# Patient Record
Sex: MALE | Race: BLACK OR AFRICAN AMERICAN | NOT HISPANIC OR LATINO | Employment: FULL TIME | URBAN - METROPOLITAN AREA
[De-identification: names, ages, dates, MRNs, and addresses within clinical notes are randomized per-mention and may not be internally consistent; named-entity substitution may affect disease eponyms.]

---

## 2017-03-22 ENCOUNTER — HOSPITAL ENCOUNTER (EMERGENCY)
Facility: OTHER | Age: 32
Discharge: HOME | End: 2017-03-22
Attending: INTERNAL MEDICINE

## 2017-03-22 VITALS
HEART RATE: 80 BPM | HEIGHT: 67 IN | OXYGEN SATURATION: 98 % | TEMPERATURE: 98 F | WEIGHT: 222 LBS | DIASTOLIC BLOOD PRESSURE: 56 MMHG | SYSTOLIC BLOOD PRESSURE: 112 MMHG | BODY MASS INDEX: 34.84 KG/M2 | RESPIRATION RATE: 20 BRPM

## 2017-03-22 DIAGNOSIS — R07.89 CHEST PAIN, NON-CARDIAC: Primary | ICD-10-CM

## 2017-03-22 LAB
ALBUMIN SERPL-MCNC: 3.6 G/DL (ref 3.3–5.5)
ALP SERPL-CCNC: 67 U/L (ref 42–141)
BILIRUB SERPL-MCNC: 0.8 MG/DL (ref 0.2–1.6)
BUN SERPL-MCNC: 11 MG/DL (ref 7–22)
CALCIUM SERPL-MCNC: 9.3 MG/DL (ref 8–10.3)
CHLORIDE SERPL-SCNC: 103 MMOL/L (ref 98–108)
CREAT SERPL-MCNC: 1 MG/DL (ref 0.6–1.2)
GLUCOSE SERPL-MCNC: 107 MG/DL (ref 73–118)
POC ALT (SGPT): 65 U/L (ref 10–47)
POC AST (SGOT): 64 U/L (ref 11–38)
POC CARDIAC TROPONIN I: 0 NG/ML
POC TCO2: 26 MMOL/L (ref 18–33)
POTASSIUM BLD-SCNC: 3.8 MMOL/L (ref 3.6–5.1)
PROTEIN, POC: 7.8 G/DL (ref 6.4–8.1)
SAMPLE: NORMAL
SODIUM BLD-SCNC: 144 MMOL/L (ref 128–145)

## 2017-03-22 PROCEDURE — 93005 ELECTROCARDIOGRAM TRACING: CPT

## 2017-03-22 PROCEDURE — 80053 COMPREHEN METABOLIC PANEL: CPT

## 2017-03-22 PROCEDURE — 85025 COMPLETE CBC W/AUTO DIFF WBC: CPT

## 2017-03-22 PROCEDURE — 93010 ELECTROCARDIOGRAM REPORT: CPT | Mod: ,,, | Performed by: INTERNAL MEDICINE

## 2017-03-22 PROCEDURE — 99284 EMERGENCY DEPT VISIT MOD MDM: CPT | Mod: 25

## 2017-03-22 PROCEDURE — 84484 ASSAY OF TROPONIN QUANT: CPT

## 2017-03-22 NOTE — ED AVS SNAPSHOT
"          [facility] EMERGENCY DEPARTMENT  [address] [place] [address]               Cristo Kennedy Penwright   3/22/2017  9:38 PM   ED    Description:  Male : 1985   Department:  [facility]           Your Care was Coordinated By:     Provider Role From To    Elvin Lux MD Attending Provider 17 2510 --      Reason for Visit     Chest Pain           Diagnoses this Visit        Comments    Chest pain, non-cardiac    -  Primary       ED Disposition     ED Disposition Condition Comment    Discharge             To Do List           Follow-up Information     Follow up with Gibran Wood NP In 1 day(s).    Specialty:  Internal Medicine    Contact information:    [address] [place] [address]  [phone]        [facility] On Call     [facility] On Call Nurse Care Line -  Assistance  Registered nurses in the [facility] On Call Center provide clinical advisement, health education, appointment booking, and other advisory services.  Call for this free service at [phone].             Medications           Message regarding Medications     Verify the changes and/or additions to your medication regime listed below are the same as discussed with your clinician today.  If any of these changes or additions are incorrect, please notify your healthcare provider.             Verify that the below list of medications is an accurate representation of the medications you are currently taking.  If none reported, the list may be blank. If incorrect, please contact your healthcare provider. Carry this list with you in case of emergency.           Current Medications     albuterol (ACCUNEB) 0.63 mg/3 mL Nebu Take 3 mLs (0.63 mg total) by nebulization every 6 (six) hours as needed (cough).           Clinical Reference Information           Your Vitals Were     BP Pulse Temp Resp Height Weight    132/73 87 98.4 °F (36.9 °C) (Temporal) 20 5' 7" (1.702 m) 100.7 kg (222 lb)    SpO2 " BMI             100% 34.77 kg/m2         Allergies as of 3/22/2017     No Known Allergies      Immunizations Administered on Date of Encounter - 3/22/2017     None      ED Micro, Lab, POCT     Start Ordered       Status Ordering Provider    03/22/17 2233 03/22/17 2233  Troponin ISTAT  Once      Final result     03/22/17 2229 03/22/17 2229  POCT CMP  Once      Final result     03/22/17 2214 03/22/17 2213  POCT CMP  Once      Completed     03/22/17 2214 03/22/17 2213  POCT CBC  Once      Acknowledged     03/22/17 2213 03/22/17 2213  POCT Troponin  Once      Completed       ED Imaging Orders     None        Discharge Instructions           Noncardiac Chest Pain    Based on your visit today, the health care provider doesnt know what is causing your chest pain. In most cases, people who come to the emergency department with chest pain dont have a problem with their heart. Instead, the pain is caused by other conditions. These may be problems with the lungs, muscles, bones, digestive tract, nerves, or mental health.  Lung problems  · Inflammation around the lungs (pleurisy)  · Collapsed lung (pneumothorax)  · Fluid around the lungs (pleural effusion)  · Lung cancer. This is a rare cause of chest pain.  Muscle or bone problems  · Inflamed cartilage between the ribs (pleurisy)  · Fibromyalgia  · Rheumatoid arthritis  Digestive system problems  · Reflux  · Stomach ulcer  · Spasms of the esophagus  · Gall stones  · Gallbladder inflammation  Mental health conditions  · Panic or anxiety attacks  · Emotional distress  Your condition doesnt seem serious and your pain doesnt appear to be coming from your heart. But sometimes the signs of a serious problem take more time to appear. Watch for the warning signs listed below.  Home care  Follow these guidelines when caring for yourself at home:  · Rest today and avoid strenuous activity.  · Take any prescribed medicine as directed.  Follow-up care  Follow up with your health care  provider, or as advised, if you dont start to feel better within 24 hours.  When to seek medical advice  Call your health care provider right away if any of these occur:  · A change in the type of pain. Call if it feels different, becomes more serious, lasts longer, or begins to spread into your shoulder, arm, neck, jaw, or back.  · Shortness of breath  · You feel more pain when you breathe  · Cough with dark-colored mucus or blood  · Weakness, dizziness, or fainting  · Fever of 100.4ºF (38ºC) or higher, or as directed by your health care provider  · Swelling, pain, or redness in one leg  Date Last Reviewed: 11/24/2014  © 9562-2804 [employer]. [address] [place] [address], [place] [address]. All rights reserved. This information is not intended as a substitute for professional medical care. Always follow your healthcare professional's instructions.          [facility] Sign-Up     Activating your [facility] account is as easy as 1-2-3!     1) Visit [email or web address], select Sign Up Now, enter this activation code and your date of birth, then select Next.  [email or web address]  Expires: 5/6/2017 11:32 PM      2) Create a username and password to use when you visit [facility] in the future and select a security question in case you lose your password and select Next.    3) Enter your e-mail address and click Sign Up!    Additional Information  If you have questions, please e-mail [email or web address] or call [phone] to talk to our [facility] staff. Remember, [facility] is NOT to be used for urgent needs. For medical emergencies, dial 911.         Smoking Cessation     If you would like to quit smoking:   You may be eligible for free services if you are a [place] resident and started smoking cigarettes before September 1, 1988.  Call the Smoking Cessation Trust (SCT) toll free at (475) 489-6201 or (997) 351-1972.   Call 4-873-QUIT-NOW if you do not meet the above criteria.             [facility]  Department complies with applicable Federal civil rights laws and does not discriminate on the basis of race, color, national origin, age, disability, or sex.        Language Assistance Services     ATTENTION: Language assistance services are available, free of charge. Please call [phone].      ATENCIÓN: Si habla denisañol, tiene a loyola disposición servicios gratuitos de asistencia lingüística. Llame al [phone].     CHÚ Ý: N?u b?n nói Ti?ng Vi?t, có các d?ch v? h? tr? ngôn ng? mi?n phí dành cho b?n. G?i s? [phone].

## 2017-03-23 NOTE — DISCHARGE INSTRUCTIONS
Noncardiac Chest Pain    Based on your visit today, the health care provider doesnt know what is causing your chest pain. In most cases, people who come to the emergency department with chest pain dont have a problem with their heart. Instead, the pain is caused by other conditions. These may be problems with the lungs, muscles, bones, digestive tract, nerves, or mental health.  Lung problems  · Inflammation around the lungs (pleurisy)  · Collapsed lung (pneumothorax)  · Fluid around the lungs (pleural effusion)  · Lung cancer. This is a rare cause of chest pain.  Muscle or bone problems  · Inflamed cartilage between the ribs (pleurisy)  · Fibromyalgia  · Rheumatoid arthritis  Digestive system problems  · Reflux  · Stomach ulcer  · Spasms of the esophagus  · Gall stones  · Gallbladder inflammation  Mental health conditions  · Panic or anxiety attacks  · Emotional distress  Your condition doesnt seem serious and your pain doesnt appear to be coming from your heart. But sometimes the signs of a serious problem take more time to appear. Watch for the warning signs listed below.  Home care  Follow these guidelines when caring for yourself at home:  · Rest today and avoid strenuous activity.  · Take any prescribed medicine as directed.  Follow-up care  Follow up with your health care provider, or as advised, if you dont start to feel better within 24 hours.  When to seek medical advice  Call your health care provider right away if any of these occur:  · A change in the type of pain. Call if it feels different, becomes more serious, lasts longer, or begins to spread into your shoulder, arm, neck, jaw, or back.  · Shortness of breath  · You feel more pain when you breathe  · Cough with dark-colored mucus or blood  · Weakness, dizziness, or fainting  · Fever of 100.4ºF (38ºC) or higher, or as directed by your health care provider  · Swelling, pain, or redness in one leg  Date Last Reviewed: 11/24/2014  © 7071-8236  [employer] [facility], [email or web address]. [address] [place] [address], [place] [address]. All rights reserved. This information is not intended as a substitute for professional medical care. Always follow your healthcare professional's instructions.

## 2017-03-23 NOTE — ED PROVIDER NOTES
Encounter Date: 3/22/2017       History     Chief Complaint   Patient presents with    Chest Pain     Review of patient's allergies indicates:  No Known Allergies  Patient is a 31 y.o. male presenting with the following complaint: chest pain. The history is provided by the patient. No  was used.   Chest Pain   Illness onset: at noon today. Duration of episode(s) is 3 hours. Chest pain occurs frequently. The chest pain is resolved. At its most intense, the chest pain is at 8/10. The chest pain is currently at 0/10. The quality of the pain is described as sharp. The pain does not radiate. Pertinent negatives for primary symptoms include no palpitations. He tried nothing for the symptoms. There are no known risk factors.     Past Medical History:   Diagnosis Date    Gastric ulcer due to Helicobacter pylori     Gastric ulcer, acute     GERD (gastroesophageal reflux disease)      Past Surgical History:   Procedure Laterality Date    HERNIA REPAIR       History reviewed. No pertinent family history.  Social History   Substance Use Topics    Smoking status: Current Some Day Smoker     Types: Cigarettes    Smokeless tobacco: Never Used    Alcohol use Yes     Review of Systems   Constitutional: Negative.    HENT: Negative.    Eyes: Negative.    Respiratory: Negative.    Cardiovascular: Positive for chest pain. Negative for palpitations and leg swelling.   Gastrointestinal: Negative for abdominal distention, blood in stool, constipation and diarrhea.   Endocrine: Negative.    Musculoskeletal: Negative.    Skin: Negative.    Allergic/Immunologic: Negative.    Neurological: Negative.    Hematological: Negative.        Physical Exam   Initial Vitals   BP Pulse Resp Temp SpO2   03/22/17 2128 03/22/17 2128 03/22/17 2128 03/22/17 2128 03/22/17 2128   131/78 85 20 98.4 °F (36.9 °C) 96 %     Physical Exam    Nursing note and vitals reviewed.  Constitutional: He appears well-developed and well-nourished.    HENT:   Head: Normocephalic.   Eyes: EOM are normal.   Neck: Normal range of motion. Neck supple.   Cardiovascular: Normal rate, regular rhythm, normal heart sounds and intact distal pulses. Exam reveals no gallop and no friction rub.    No murmur heard.  Pulmonary/Chest: Breath sounds normal.   Abdominal: Soft. Bowel sounds are normal.   Musculoskeletal: Normal range of motion.   Neurological: He is alert. He has normal strength.   Skin: Skin is warm and dry.         ED Course   Procedures  Labs Reviewed   TROPONIN ISTAT   POCT CBC   POCT TROPONIN   POCT CMP   POCT CMP        Labs Reviewed  Admission on 03/22/2017, Discharged on 03/22/2017   Component Date Value Ref Range Status    Albumin, POC 03/22/2017 3.6  3.3 - 5.5 g/dL Final    Alkaline Phosphatase, POC 03/22/2017 67  42 - 141 U/L Final    ALT (SGPT), POC 03/22/2017 65  10.0 - 47.0 U/L Final    AST (SGOT), POC 03/22/2017 64  11.0 - 38 U/L Final    POC BUN 03/22/2017 11  7.0 - 22.0 mg/dL Final    Calcium, POC 03/22/2017 9.3  8.0 - 10.3 mg/dL Final    POC Chloride 03/22/2017 103  98 - 108 mmol/L Final    POC Creatinine 03/22/2017 1.0  0.6 - 1.2 mg/dL Final    POC Glucose 03/22/2017 107  73 - 118 mg/dL Final    POC Potassium 03/22/2017 3.8  3.6 - 5.1 mmol/L Final    POC Sodium 03/22/2017 144  128 - 145 mmol/L Final    Bilirubin 03/22/2017 0.8  0.2 - 1.6 mg/dL Final    POC TCO2 03/22/2017 26  18 - 33 mmol/L Final    Protein 03/22/2017 7.8  6.4 - 8.1 g/dL Final    POC Cardiac Troponin I 03/22/2017 0.00  <0.09 ng/mL Final    Sample 03/22/2017 UNK   Final        Imaging Reviewed    Imaging Results     None          Medications given in ED    Medications - No data to display    Discharge Medications     Discharge Medication List as of 3/22/2017 11:33 PM      CONTINUE these medications which have NOT CHANGED    Details   albuterol (ACCUNEB) 0.63 mg/3 mL Nebu Take 3 mLs (0.63 mg total) by nebulization every 6 (six) hours as needed (cough)., Starting  11/20/2016, Until Mon 11/20/17, Print                   Patient discharged to home in stable condition with instructions to:   1. Please take all meds as prescribed.  2. Follow-up with your primary care doctor   3. Return precautions discussed and patient and/or family/caretaker understands to return to the emergency room for any concerns including worsening of your current symptoms, fever, chills, night sweats, worsening pain, chest pain, shortness of breath, nausea, vomiting, diarrhea, bleeding, headache, difficulty talking, visual disturbances, weakness, numbness or any other acute concerns                         ED Course     Clinical Impression:   Non-cardiac chest pain  Disposition:   Disposition: Discharged  Condition: Stable       Elvin Lux MD  03/22/17 7557

## 2017-03-23 NOTE — ED NOTES
Pt supine in bed alert and oriented, states he began having c/p this afternoon while at work, describes the pain as sharp and to the left of his chest, no radiation, no n/v, no previous episodes, denies swelling to hands or feet

## 2017-04-01 ENCOUNTER — HOSPITAL ENCOUNTER (EMERGENCY)
Facility: HOSPITAL | Age: 32
Discharge: HOME | End: 2017-04-01
Attending: EMERGENCY MEDICINE

## 2017-04-01 VITALS
TEMPERATURE: 101 F | OXYGEN SATURATION: 95 % | HEART RATE: 90 BPM | WEIGHT: 222 LBS | HEIGHT: 66 IN | SYSTOLIC BLOOD PRESSURE: 127 MMHG | BODY MASS INDEX: 35.68 KG/M2 | RESPIRATION RATE: 18 BRPM | DIASTOLIC BLOOD PRESSURE: 65 MMHG

## 2017-04-01 DIAGNOSIS — R55 VASOVAGAL EPISODE: Primary | ICD-10-CM

## 2017-04-01 DIAGNOSIS — K52.9 GASTROENTERITIS: ICD-10-CM

## 2017-04-01 DIAGNOSIS — E86.0 DEHYDRATION: ICD-10-CM

## 2017-04-01 LAB
FLUAV AG SPEC QL IA: NEGATIVE
FLUBV AG SPEC QL IA: NEGATIVE
POCT GLUCOSE: 104 MG/DL (ref 70–110)
SPECIMEN SOURCE: NORMAL

## 2017-04-01 PROCEDURE — 96374 THER/PROPH/DIAG INJ IV PUSH: CPT

## 2017-04-01 PROCEDURE — 96361 HYDRATE IV INFUSION ADD-ON: CPT

## 2017-04-01 PROCEDURE — 82962 GLUCOSE BLOOD TEST: CPT

## 2017-04-01 PROCEDURE — 87400 INFLUENZA A/B EACH AG IA: CPT | Mod: 59

## 2017-04-01 PROCEDURE — 63600175 PHARM REV CODE 636 W HCPCS: Performed by: NURSE PRACTITIONER

## 2017-04-01 PROCEDURE — 25000003 PHARM REV CODE 250: Performed by: NURSE PRACTITIONER

## 2017-04-01 PROCEDURE — 93005 ELECTROCARDIOGRAM TRACING: CPT

## 2017-04-01 PROCEDURE — 99284 EMERGENCY DEPT VISIT MOD MDM: CPT | Mod: 25

## 2017-04-01 RX ORDER — ONDANSETRON 4 MG/1
4 TABLET, FILM COATED ORAL EVERY 6 HOURS
Qty: 12 TABLET | Refills: 0 | Status: SHIPPED | OUTPATIENT
Start: 2017-04-01 | End: 2019-06-12

## 2017-04-01 RX ORDER — ONDANSETRON 2 MG/ML
4 INJECTION INTRAMUSCULAR; INTRAVENOUS
Status: COMPLETED | OUTPATIENT
Start: 2017-04-01 | End: 2017-04-01

## 2017-04-01 RX ORDER — ACETAMINOPHEN 500 MG
1000 TABLET ORAL
Status: COMPLETED | OUTPATIENT
Start: 2017-04-01 | End: 2017-04-01

## 2017-04-01 RX ADMIN — SODIUM CHLORIDE 1000 ML: 0.9 INJECTION, SOLUTION INTRAVENOUS at 02:04

## 2017-04-01 RX ADMIN — SODIUM CHLORIDE 1000 ML: 0.9 INJECTION, SOLUTION INTRAVENOUS at 01:04

## 2017-04-01 RX ADMIN — ONDANSETRON 4 MG: 2 INJECTION INTRAMUSCULAR; INTRAVENOUS at 01:04

## 2017-04-01 RX ADMIN — ACETAMINOPHEN 1000 MG: 500 TABLET ORAL at 03:04

## 2017-04-01 NOTE — ED PROVIDER NOTES
"Encounter Date: 4/1/2017    SCRIBE #1 NOTE: I, Ally Cruz, am scribing for, and in the presence of,  Jennifer Wellington NP. I have scribed the following portions of the note - Other sections scribed: HPI and ROS.       History     Chief Complaint   Patient presents with    Emesis     pt states vomiting and diarrhea since last night     Review of patient's allergies indicates:  No Known Allergies  HPI Comments: Chief Complaint: Emesis    HPI: This 31 y.o. Male with GERD and gastric ulcers presents to the ED c/o emesis. There's associated chills, nausea, more than 8 episodes of "watery" diarrhea and a generalized headache. Symptoms began suddenly at 9 pm last night. Symptoms are constant, severe and worsening. There's no attempted treatment. Patient denies blood in stool, abdominal pain or generalized body aches.     The history is provided by the patient. No  was used.     Past Medical History:   Diagnosis Date    Gastric ulcer due to Helicobacter pylori     Gastric ulcer, acute     GERD (gastroesophageal reflux disease)      Past Surgical History:   Procedure Laterality Date    HERNIA REPAIR       History reviewed. No pertinent family history.  Social History   Substance Use Topics    Smoking status: Current Some Day Smoker     Types: Cigarettes    Smokeless tobacco: Never Used    Alcohol use Yes     Review of Systems   Constitutional: Positive for chills. Negative for fever.   HENT: Negative for ear pain and sore throat.    Eyes: Negative for visual disturbance.   Respiratory: Negative for cough and shortness of breath.    Cardiovascular: Negative for chest pain.   Gastrointestinal: Positive for diarrhea, nausea and vomiting. Negative for abdominal pain and constipation.   Genitourinary: Negative for difficulty urinating and dysuria.   Musculoskeletal: Negative for back pain and myalgias.   Skin: Negative for rash.   Neurological: Positive for headaches. Negative for dizziness, " weakness and light-headedness.       Physical Exam   Initial Vitals   BP Pulse Resp Temp SpO2   04/01/17 1253 04/01/17 1253 04/01/17 1253 04/01/17 1253 04/01/17 1253   127/71 100 18 98.8 °F (37.1 °C) 98 %     Physical Exam    Nursing note and vitals reviewed.  Constitutional: He appears well-developed and well-nourished.   HENT:   Head: Normocephalic.   Dry m/m   Eyes: Conjunctivae are normal.   Neck: Normal range of motion. Neck supple.   Cardiovascular: Normal rate, regular rhythm and normal heart sounds.   Pulmonary/Chest: Breath sounds normal.   Abdominal: Soft. He exhibits no distension. There is no tenderness. There is no rebound and no guarding.   Hyperactive bowel sounds.   Musculoskeletal: Normal range of motion.   Neurological: He is alert and oriented to person, place, and time. He has normal strength.   Skin: Skin is warm and dry.   Psychiatric: He has a normal mood and affect.         ED Course   Procedures  Labs Reviewed   INFLUENZA A AND B ANTIGEN   POCT GLUCOSE   POCT GLUCOSE MONITORING CONTINUOUS             Medical Decision Making:   Initial Assessment:   31-year-old male presents with diarrhea since 9 PM last night he vomited ×1 this morning  Differential Diagnosis:   Enteritis  Infectious diarrhea  Appendicitis  ED Management:  Diagnosis management comments: This is an urgent evaluation of a 31-year-old male that presented to the ER with c/o diarrhea since 2100 last p.m. Pts exam was unremarkable except for dehydration.; pt does not appear ill or toxic.    I believe the headache is secondary to the dehydration.  I will rehydrate the patient I will give a little Zofran for the emesis that he had waited in the ER and we will reassess.    While the IV was being put in patient most likely had a vasovagal response to it upon my arrival in the room he was able to say his name he was diaphoretic he was a little pale his heart rate was in the 90s,  blood glucose was 107, his blood pressure was normal,  And his 12-lead EKG was negative for STEMI     Patient states he is feeling much better and the headache is gone.  Patient also reports any time he sees his own blood he passes out.     Based on exam today - I have low suspicion for medical, surgical or other life threatening illness and I believe pt is safe for discharge and outpatient f/u.    Plan discharge and was found the patient had a fever of 101.  I will send a rapid influenza and contact the patient if it is positive.    Pt verbalizes understanding of d/c instructions and will return for worsening condition.    Case was discussed with attending who agrees with A&P    Recklessly with negative left message on the patient's mobile phone            Scribe Attestation:   Scribe #1: I performed the above scribed service and the documentation accurately describes the services I performed. I attest to the accuracy of the note.    Attending Attestation:           Physician Attestation for Scribe:  Physician Attestation Statement for Scribe #1: I, Jennifer Wellington NP, reviewed documentation, as scribed by Ally Cruz in my presence, and it is both accurate and complete.                 ED Course     Clinical Impression:   The primary encounter diagnosis was Vasovagal episode. Diagnoses of Dehydration and Gastroenteritis were also pertinent to this visit.    Disposition:   Disposition: Discharged  Condition: Stable       Jennifer Wellington NP  04/01/17 6752

## 2017-04-01 NOTE — ED TRIAGE NOTES
Pt presents with nausea and vomiting since 2100 yesterday.  Pt states that having diarrhea along with emesis.   Fever and chills this morning.  Also states been having headaches.

## 2017-04-01 NOTE — DISCHARGE INSTRUCTIONS
Dehydration    The human body is comprised largely of water. If you lose more fluids than you take in, you can become dehydrated. This means there are not enough fluids in your body for it to function right. Mild dehydration can cause weakness, confusion, or muscle cramps. In extreme cases, it can lead to brain damage and even death. That's why prompt treatment is crucial.  Risk factors  Anyone can become dehydrated. But infants, children, and older adults are at greatest risk. You are most likely to lose fluids with severe vomiting, diarrhea, or a fever. Exercising or working hard--especially in hot weather--can also cause excess fluid loss.  What to do  Drinking liquids is the best way to prevent dehydration. Water is best, but juice or frozen pops can also help. Your doctor may suggest electrolyte solutions for sick infants and young children.  When to go to the emergency room (ER)  Go to an ER right away for these symptoms:  Adults  · Very dark urine and little urine output  · Dizziness, weakness, confusion, fainting  Children  · Sunken eyes  · Little or no urine output (for infants, no wet diaper in 8 hours)  · Very dark urine  · Skin that doesn't bounce back quickly when pinched  · Crying without tears  What to expect in the ER  Your blood pressure, temperature, and heart rate will be checked. You may have blood or urine tests. The main treatment for dehydration is fluids. You may be given these to drink. Or, you may receive them through a vein in your arm. You also may be treated for diarrhea, vomiting, or a high fever.   Date Last Reviewed: 7/18/2015  © 5815-3567 [employer]. [address] [place] [address], [place] [address]. All rights reserved. This information is not intended as a substitute for professional medical care. Always follow your healthcare professional's instructions.          Noninfectious Gastroenteritis (Ages 6 Years to Adult)    Gastroenteritis can cause nausea, vomiting, diarrhea,  and abdominal cramping. This may occur as a result of food sensitivity, inflammation of your gastrointestinal tract, medicines, stress, or other causes not related to infection. Your symptoms will usually last from 1 to 3 days, but can last longer. Antibiotics are not effective, but simple home treatment will be helpful.  Home care  Medicine  · You may use acetaminophen or NSAID medicines like ibuprofen or naproxen to control fever, unless another medicine is prescribed. (Note: If you have chronic liver or kidney disease, or ever had a stomach ulcer or gastrointestinalI bleeding, talk with your healthcare provider before using these medicines.) Aspirin should never be used in anyone under 18 years of age who is ill with a fever. It may cause severe liver damage. Don't increase your NSAID medicines if you are already taking these medicines for another condition (like arthritis). Don't use NSAIDS if you are on aspirin (such as for heart disease, or after a stroke).  · If medicines for diarrhea or vomiting are prescribed, take only as directed.  General care and preventing spread of the illness  · If symptoms are severe, rest at home for the next 24 hours or until you feel better.  · Hand washing with soap and water is the best way to prevent the spread of infection. Wash your hands after touching anyone who is sick.  · Wash your hands after using the toilet and before meals. Clean the toilet after each use.  · Caffeine, tobacco, and alcohol can make your diarrhea, cramping, and pain worse.  Diet  · Water and clear liquids are important so you do not get dehydrated. Drink a small amount at a time.  · Do not force yourself to eat, especially if you have cramps, vomiting, or diarrhea. When you finally decide to start eating, do not eat large amounts at a time, even if you are hungry.  · If you eat, avoid fatty, greasy, spicy, or fried foods.  · Do not eat dairy products if you have diarrhea; they can make the diarrhea  worse.  During the first 24 hours (the first full day), follow the diet below:  · Beverages: Water, clear liquids, soft drinks without caffeine, like ginger ale; mineral water (plain or flavored); decaffeinated tea and coffee.  · Soups: Clear broth, consommé, and bouillon Sports drinks aren't a good choice because they have too much sugar and not enough electrolytes. In this case, commercially available products called oral rehydration solutions are best.  · Desserts: Plain gelatin, popsicles, and fruit juice bars.  During the next 24 hours (the second day), you may add the following to the above if you have improved. If not, continue what you did the first day:  · Hot cereal, plain toast, bread, rolls, crackers  · Plain noodles, rice, mashed potatoes, chicken noodle or rice soup  · Unsweetened canned fruit (avoid pineapple), bananas  · Limit caffeine and chocolate. No spices or seasonings except salt.  During the next 24 hours  · Gradually resume a normal diet, as you feel better and your symptoms improve.  · If at any time your symptoms start getting worse, go back to clear liquids until you feel better.  Food preparation  · If you have diarrhea, you should not prepare food for others. When you  prepare food for yourself, wash your hands before and after.  · Wash your hands after using cutting boards, countertops, and knives that have been in contact with raw food.  · Keep uncooked meats away from cooked and ready-to-eat foods.  Follow-up care  Follow up with your healthcare provider if you are not improving over the next 2 to 3 days, or as advised. If a stool (diarrhea) sample was taken, call for the results as directed.  When to seek medical care  Call your healthcare provider right away if any of these occur:   · Increasing abdominal pain or constant lower right abdominal pain  · Continued vomiting (unable to keep liquids down)  · Frequent diarrhea (more than 5 times a day)  · Blood in vomit or stool (black or  red color)  · Inability to tolerate solid food after a few days.  · Dark urine, reduced urine output  · Weakness, dizziness  · Drowsiness  · Fever of 100.4ºF (38.0ºC) or higher, or as directed by your healthcare provider  · New rash  Call 911  Call 911 if any of these occur:  · Trouble breathing  · Chest pain  · Confusion  · Severe drowsiness or trouble awakening  · Seizure  · Stiff neck  Date Last Reviewed: 11/16/2015 © 2000-2016 [employer]. [address] [place] [address]. All rights reserved. This information is not intended as a substitute for professional medical care. Always follow your healthcare professional's instructions.        Fainting: Vagal Reaction  Fainting (syncope) is a temporary loss of consciousness. Its also called passing out. It occurs when blood flow to the brain is less than normal. Your health care provider believes that your fainting was because of a vagal reaction. This condition is not a sign of serious disease.  A vagal reaction is a response in your body that causes your pulse to slow down or the blood vessels to expand. This causes your blood pressure to fall. And this sends less blood to your brain if you are standing or sitting. That results in dizziness, near-fainting, or fainting. Lying down usually stops the reaction within 60 seconds.  This response can occur during sudden fear, severe pain, emotional stress, overexertion, overheating, hunger, nausea or vomiting, prolonged standing, or standing up after sitting or lying for a long time.  Home care  Follow these guidelines when caring for yourself at home:  · Rest today. Go back to your normal activities as soon as you are feeling back to normal.  · If you feel lightheaded or dizzy, lie down right away. Or sit with your head lowered between your knees.  Follow-up care  Follow up with your health care provider, or as advised.  When to seek medical advice  Call your health care provider right away if any  of these occur:  · Another fainting spell thats not explained by the common causes listed above  · Pain in your chest, arm, neck, jaw, back, or abdomen  · Shortness of breath  · Severe headache or seizure  · Blood in vomit or stools (black or red color)  · Unexpected vaginal bleeding  · Your heart beats very rapidly, very slowly, or irregularly (palpitations)  Also call your provider if you have signs of stroke:  · Weakness in an arm or leg or on one side of the face  · Difficulty speaking or seeing  · Extreme drowsiness, confusion, dizziness, or fainting   Date Last Reviewed: 11/25/2014  © 3975-1973 [employer]. [address] [place] [address], [place] [address]. All rights reserved. This information is not intended as a substitute for professional medical care. Always follow your healthcare professional's instructions.

## 2017-04-01 NOTE — ED AVS SNAPSHOT
[facility]  [place] [address]               Cristo Ruff   2017  1:14 PM   ED    Description:  Male : 1985   Department:  [facility]           Your Care was Coordinated By:     Provider Role From To    Concha Johnson MD Attending Provider 17 [phone] --    Jennifer Wellington NP Nurse Practitioner 17 [phone] --      Reason for Visit     Emesis           Diagnoses this Visit        Comments    Vasovagal episode    -  Primary     Dehydration         Gastroenteritis           ED Disposition     ED Disposition Condition Comment    Discharge             To Do List           Follow-up Information     Schedule an appointment as soon as possible for a visit with Gibran Wood NP.    Specialty:  Internal Medicine    Contact information:    [address] [place] [address] [phone]          Follow up with [facility].    Specialty:  Emergency Medicine    Why:  If symptoms worsen or any other concerns    Contact information:    [place] [address] [phone]       These Medications        Disp Refills Start End    ondansetron (ZOFRAN) 4 MG tablet 12 tablet 0 2017     Take 1 tablet (4 mg total) by mouth every 6 (six) hours. - Oral    Pharmacy: [facility] [address] [place] [address] AT [facility] #: [phone]         [facility]     [facility] Care [facility] - 24/ Assistance  Unless otherwise directed by your provider, please contact [facility], our nurse care line that is available for 24/7 assistance.     Registered nurses in the [facility] provide: appointment scheduling, clinical advisement, health education, and other advisory services.  Call: [phone] (toll free)               Medications           Message regarding Medications     Verify the changes and/or additions to your  "medication regime listed below are the same as discussed with your clinician today.  If any of these changes or additions are incorrect, please notify your healthcare provider.        START taking these NEW medications        Refills    ondansetron (ZOFRAN) 4 MG tablet 0    Sig: Take 1 tablet (4 mg total) by mouth every 6 (six) hours.    Class: Print    Route: Oral      These medications were administered today        Dose Freq    sodium chloride 0.9% bolus 1,000 mL 1,000 mL ED 1 Time    Sig: Inject 1,000 mLs into the vein ED 1 Time.    Class: Normal    Route: Intravenous    ondansetron injection 4 mg 4 mg ED 1 Time    Sig: Inject 4 mg into the vein ED 1 Time.    Class: Normal    Route: Intravenous    sodium chloride 0.9% bolus 1,000 mL 1,000 mL ED 1 Time    Sig: Inject 1,000 mLs into the vein ED 1 Time.    Class: Normal    Route: Intravenous           Verify that the below list of medications is an accurate representation of the medications you are currently taking.  If none reported, the list may be blank. If incorrect, please contact your healthcare provider. Carry this list with you in case of emergency.           Current Medications     albuterol (ACCUNEB) 0.63 mg/3 mL Nebu Take 3 mLs (0.63 mg total) by nebulization every 6 (six) hours as needed (cough).    ondansetron (ZOFRAN) 4 MG tablet Take 1 tablet (4 mg total) by mouth every 6 (six) hours.           Clinical Reference Information           Your Vitals Were     BP Pulse Temp Resp Height Weight    104/64 (BP Location: Left arm, Patient Position: Sitting, BP Method: Automatic) 100 98.8 °F (37.1 °C) (Oral) 18 5' 6" (1.676 m) 100.7 kg (222 lb)    SpO2 BMI             98% 35.83 kg/m2         Allergies as of 4/1/2017     No Known Allergies      Immunizations Administered on Date of Encounter - 4/1/2017     None      ED Micro, Lab, POCT     Start Ordered       Status Ordering Provider    04/01/17 1346 04/01/17 1346  POCT glucose  Once      Final result     " 04/01/17 1346 04/01/17 1345  POCT glucose  Once      Acknowledged       ED Imaging Orders     None        Discharge Instructions         Dehydration    The human body is comprised largely of water. If you lose more fluids than you take in, you can become dehydrated. This means there are not enough fluids in your body for it to function right. Mild dehydration can cause weakness, confusion, or muscle cramps. In extreme cases, it can lead to brain damage and even death. That's why prompt treatment is crucial.  Risk factors  Anyone can become dehydrated. But infants, children, and older adults are at greatest risk. You are most likely to lose fluids with severe vomiting, diarrhea, or a fever. Exercising or working hard--especially in hot weather--can also cause excess fluid loss.  What to do  Drinking liquids is the best way to prevent dehydration. Water is best, but juice or frozen pops can also help. Your doctor may suggest electrolyte solutions for sick infants and young children.  When to go to the emergency room (ER)  Go to an ER right away for these symptoms:  Adults  · Very dark urine and little urine output  · Dizziness, weakness, confusion, fainting  Children  · Sunken eyes  · Little or no urine output (for infants, no wet diaper in 8 hours)  · Very dark urine  · Skin that doesn't bounce back quickly when pinched  · Crying without tears  What to expect in the ER  Your blood pressure, temperature, and heart rate will be checked. You may have blood or urine tests. The main treatment for dehydration is fluids. You may be given these to drink. Or, you may receive them through a vein in your arm. You also may be treated for diarrhea, vomiting, or a high fever.   Date Last Reviewed: 7/18/2015 © 2000-2016 [employer]. [address] [place] [address], [place] [address]. All rights reserved. This information is not intended as a substitute for professional medical care. Always follow your healthcare  professional's instructions.          Noninfectious Gastroenteritis (Ages 6 Years to Adult)    Gastroenteritis can cause nausea, vomiting, diarrhea, and abdominal cramping. This may occur as a result of food sensitivity, inflammation of your gastrointestinal tract, medicines, stress, or other causes not related to infection. Your symptoms will usually last from 1 to 3 days, but can last longer. Antibiotics are not effective, but simple home treatment will be helpful.  Home care  Medicine  · You may use acetaminophen or NSAID medicines like ibuprofen or naproxen to control fever, unless another medicine is prescribed. (Note: If you have chronic liver or kidney disease, or ever had a stomach ulcer or gastrointestinalI bleeding, talk with your healthcare provider before using these medicines.) Aspirin should never be used in anyone under 18 years of age who is ill with a fever. It may cause severe liver damage. Don't increase your NSAID medicines if you are already taking these medicines for another condition (like arthritis). Don't use NSAIDS if you are on aspirin (such as for heart disease, or after a stroke).  · If medicines for diarrhea or vomiting are prescribed, take only as directed.  General care and preventing spread of the illness  · If symptoms are severe, rest at home for the next 24 hours or until you feel better.  · Hand washing with soap and water is the best way to prevent the spread of infection. Wash your hands after touching anyone who is sick.  · Wash your hands after using the toilet and before meals. Clean the toilet after each use.  · Caffeine, tobacco, and alcohol can make your diarrhea, cramping, and pain worse.  Diet  · Water and clear liquids are important so you do not get dehydrated. Drink a small amount at a time.  · Do not force yourself to eat, especially if you have cramps, vomiting, or diarrhea. When you finally decide to start eating, do not eat large amounts at a time, even if you  are hungry.  · If you eat, avoid fatty, greasy, spicy, or fried foods.  · Do not eat dairy products if you have diarrhea; they can make the diarrhea worse.  During the first 24 hours (the first full day), follow the diet below:  · Beverages: Water, clear liquids, soft drinks without caffeine, like ginger ale; mineral water (plain or flavored); decaffeinated tea and coffee.  · Soups: Clear broth, consommé, and bouillon Sports drinks aren't a good choice because they have too much sugar and not enough electrolytes. In this case, commercially available products called oral rehydration solutions are best.  · Desserts: Plain gelatin, popsicles, and fruit juice bars.  During the next 24 hours (the second day), you may add the following to the above if you have improved. If not, continue what you did the first day:  · Hot cereal, plain toast, bread, rolls, crackers  · Plain noodles, rice, mashed potatoes, chicken noodle or rice soup  · Unsweetened canned fruit (avoid pineapple), bananas  · Limit caffeine and chocolate. No spices or seasonings except salt.  During the next 24 hours  · Gradually resume a normal diet, as you feel better and your symptoms improve.  · If at any time your symptoms start getting worse, go back to clear liquids until you feel better.  Food preparation  · If you have diarrhea, you should not prepare food for others. When you  prepare food for yourself, wash your hands before and after.  · Wash your hands after using cutting boards, countertops, and knives that have been in contact with raw food.  · Keep uncooked meats away from cooked and ready-to-eat foods.  Follow-up care  Follow up with your healthcare provider if you are not improving over the next 2 to 3 days, or as advised. If a stool (diarrhea) sample was taken, call for the results as directed.  When to seek medical care  Call your healthcare provider right away if any of these occur:   · Increasing abdominal pain or constant lower right  abdominal pain  · Continued vomiting (unable to keep liquids down)  · Frequent diarrhea (more than 5 times a day)  · Blood in vomit or stool (black or red color)  · Inability to tolerate solid food after a few days.  · Dark urine, reduced urine output  · Weakness, dizziness  · Drowsiness  · Fever of 100.4ºF (38.0ºC) or higher, or as directed by your healthcare provider  · New rash  Call 911  Call 911 if any of these occur:  · Trouble breathing  · Chest pain  · Confusion  · Severe drowsiness or trouble awakening  · Seizure  · Stiff neck  Date Last Reviewed: 11/16/2015 © 2000-2016 [employer]. [address] [place] [address], [place] [address]. All rights reserved. This information is not intended as a substitute for professional medical care. Always follow your healthcare professional's instructions.        Fainting: Vagal Reaction  Fainting (syncope) is a temporary loss of consciousness. Its also called passing out. It occurs when blood flow to the brain is less than normal. Your health care provider believes that your fainting was because of a vagal reaction. This condition is not a sign of serious disease.  A vagal reaction is a response in your body that causes your pulse to slow down or the blood vessels to expand. This causes your blood pressure to fall. And this sends less blood to your brain if you are standing or sitting. That results in dizziness, near-fainting, or fainting. Lying down usually stops the reaction within 60 seconds.  This response can occur during sudden fear, severe pain, emotional stress, overexertion, overheating, hunger, nausea or vomiting, prolonged standing, or standing up after sitting or lying for a long time.  Home care  Follow these guidelines when caring for yourself at home:  · Rest today. Go back to your normal activities as soon as you are feeling back to normal.  · If you feel lightheaded or dizzy, lie down right away. Or sit with your head lowered between your  knees.  Follow-up care  Follow up with your health care provider, or as advised.  When to seek medical advice  Call your health care provider right away if any of these occur:  · Another fainting spell thats not explained by the common causes listed above  · Pain in your chest, arm, neck, jaw, back, or abdomen  · Shortness of breath  · Severe headache or seizure  · Blood in vomit or stools (black or red color)  · Unexpected vaginal bleeding  · Your heart beats very rapidly, very slowly, or irregularly (palpitations)  Also call your provider if you have signs of stroke:  · Weakness in an arm or leg or on one side of the face  · Difficulty speaking or seeing  · Extreme drowsiness, confusion, dizziness, or fainting   Date Last Reviewed: 11/25/2014  © 9911-5802 [employer]. [address] [place] [address]. All rights reserved. This information is not intended as a substitute for professional medical care. Always follow your healthcare professional's instructions.          [facility] Sign-Up     Activating your [facility] account is as easy as 1-2-3!     1) Visit [email or web address], select Sign Up Now, enter this activation code and your date of birth, then select Next.  [email or web address]  Expires: 5/6/2017 11:32 PM      2) Create a username and password to use when you visit [facility] in the future and select a security question in case you lose your password and select Next.    3) Enter your e-mail address and click Sign Up!    Additional Information  If you have questions, please e-mail [email or web address] or call [phone] to talk to our [facility] staff. Remember, [facility] is NOT to be used for urgent needs. For medical emergencies, dial 911.         Smoking Cessation     If you would like to quit smoking:   You may be eligible for free services if you are a [place] resident and started smoking cigarettes before September 1, 1988.  Call the Smoking Cessation Trust (SCT) toll free at (064)  904-1368 or (999) 329-9856.   Call 1-800-QUIT-NOW if you do not meet the above criteria.   Contact us via email: [email or web address]   View our website for more information: [email or web address]         [facility] complies with applicable Federal civil rights laws and does not discriminate on the basis of race, color, national origin, age, disability, or sex.        Language Assistance Services     ATTENTION: Language assistance services are available, free of charge. Please call [phone].      ATENCIÓN: Si habla español, tiene a loyola disposición servicios gratuitos de asistencia lingüística. Llame al [phone].     CHÚ Ý: N?u b?n nói Ti?ng Vi?t, có các d?ch v? h? tr? ngôn ng? mi?n phí dành cho b?n. G?i s? [phone].

## 2017-10-08 ENCOUNTER — HOSPITAL ENCOUNTER (EMERGENCY)
Facility: OTHER | Age: 32
Discharge: HOME | End: 2017-10-08
Attending: EMERGENCY MEDICINE

## 2017-10-08 VITALS
SYSTOLIC BLOOD PRESSURE: 142 MMHG | WEIGHT: 202 LBS | HEART RATE: 61 BPM | RESPIRATION RATE: 16 BRPM | TEMPERATURE: 99 F | BODY MASS INDEX: 31.71 KG/M2 | OXYGEN SATURATION: 98 % | DIASTOLIC BLOOD PRESSURE: 82 MMHG | HEIGHT: 67 IN

## 2017-10-08 DIAGNOSIS — S60.221A CONTUSION OF RIGHT HAND, INITIAL ENCOUNTER: ICD-10-CM

## 2017-10-08 DIAGNOSIS — M79.644 THUMB PAIN, RIGHT: Primary | ICD-10-CM

## 2017-10-08 PROCEDURE — 29125 APPL SHORT ARM SPLINT STATIC: CPT | Mod: F5

## 2017-10-08 PROCEDURE — 99284 EMERGENCY DEPT VISIT MOD MDM: CPT | Mod: 25

## 2017-10-08 RX ORDER — HYDROCODONE BITARTRATE AND ACETAMINOPHEN 5; 325 MG/1; MG/1
1 TABLET ORAL EVERY 4 HOURS PRN
Qty: 6 TABLET | Refills: 0 | Status: SHIPPED | OUTPATIENT
Start: 2017-10-08 | End: 2019-06-12

## 2017-10-08 RX ORDER — NAPROXEN 500 MG/1
500 TABLET ORAL 2 TIMES DAILY WITH MEALS
Qty: 20 TABLET | Refills: 0 | Status: SHIPPED | OUTPATIENT
Start: 2017-10-08 | End: 2019-06-12

## 2017-10-08 RX ORDER — DICLOFENAC SODIUM 10 MG/G
2 GEL TOPICAL 4 TIMES DAILY
Qty: 1 TUBE | Refills: 0 | Status: SHIPPED | OUTPATIENT
Start: 2017-10-08 | End: 2019-06-12

## 2017-10-08 NOTE — ED NOTES
PT VERIFIES THAT NAME AND  ARE CORRECT.     LOC: The patient is awake, alert and aware of environment with an appropriate affect, the patient is oriented x 3 and answers all questions appropriately.    APPEARANCE: Patient resting comfortably and in no acute distress.    SKIN: The skin is warm and dry, color consistent with ethnicity, patient has normal skin turgor and moist mucus membranes, skin intact, no breakdown, brusing or alterations in skin integrity noted.    MUSCULOSKELETAL: Patient moving all extremities well, CMS intact, slight swelling noted to base of right thumb    RESPIRATORY: Airway is open and patent, trachea is midline, respirations are spontaneous, even and non-labored, no use of accessory muscles noted.    NEUROLOGIC: eyes open spontaneously, behavior appropriate to situation, follows all commands    Pt reports he was helping is father move a generator when his dad dropped his end and his thumb got caught in the handle and bent downward

## 2017-10-09 NOTE — ED PROVIDER NOTES
"Encounter Date: 10/8/2017       History     Chief Complaint   Patient presents with    Hand Pain     Pt states," I got my hand cought in a handle and hurt my right thumb."     CC injured ritht thumb picking up a generator, JPTA.  Patient is right handed.       The history is provided by the patient.   Hand Injury    The incident occurred just prior to arrival. The incident occurred at home. The injury mechanism was a direct blow. The pain is present in the right fingers. The quality of the pain is described as throbbing. The pain is at a severity of 8/10. The pain has been constant since the incident. Pertinent negatives include no fever and no malaise/fatigue. He reports no foreign bodies present. The symptoms are aggravated by movement, use and palpation. He has tried nothing for the symptoms. The treatment provided no relief.     Review of patient's allergies indicates:  No Known Allergies  Past Medical History:   Diagnosis Date    Gastric ulcer due to Helicobacter pylori     Gastric ulcer, acute     GERD (gastroesophageal reflux disease)      Past Surgical History:   Procedure Laterality Date    HERNIA REPAIR       No family history on file.  Social History   Substance Use Topics    Smoking status: Current Some Day Smoker     Types: Cigarettes    Smokeless tobacco: Never Used    Alcohol use Yes     Review of Systems   Constitutional: Negative for fever and malaise/fatigue.   HENT: Negative for sore throat.    Respiratory: Negative for shortness of breath.    Cardiovascular: Negative for chest pain.   Gastrointestinal: Negative for nausea.   Genitourinary: Negative for dysuria.   Musculoskeletal: Positive for arthralgias and joint swelling. Negative for back pain.   Skin: Negative for rash.   Neurological: Negative for weakness.   Hematological: Does not bruise/bleed easily.   All other systems reviewed and are negative.      Physical Exam     Initial Vitals [10/08/17 1736]   BP Pulse Resp Temp SpO2   (!) " 142/82 61 16 98.9 °F (37.2 °C) 98 %      MAP       102         Physical Exam    Nursing note and vitals reviewed.  Constitutional: He appears well-developed and well-nourished.   HENT:   Head: Normocephalic and atraumatic.   Right Ear: External ear normal.   Left Ear: External ear normal.   Nose: Nose normal.   Eyes: EOM are normal. Pupils are equal, round, and reactive to light.   Neck: Normal range of motion. Neck supple.   Cardiovascular: Normal rate, regular rhythm and intact distal pulses.   Pulmonary/Chest: Breath sounds normal.   Musculoskeletal: Normal range of motion. He exhibits edema and tenderness.        Hands:  Neurological: He is alert and oriented to person, place, and time. He has normal strength. No cranial nerve deficit or sensory deficit.   Skin: Skin is warm and dry. Capillary refill takes less than 2 seconds.   Psychiatric: He has a normal mood and affect.         ED Course   Splint Application  Date/Time: 10/9/2017 12:36 PM  Performed by: CINDY WILCOX  Authorized by: CINDY WILCOX   Location details: right hand  Splint type: thumb spica  Supplies used: pre-fabricated splint with imoblization of thumb.  Post-procedure: The splinted body part was neurovascularly unchanged following the procedure.  Patient tolerance: Patient tolerated the procedure well with no immediate complications        Labs Reviewed - No data to display     Imaging Results          X-Ray Hand 3 view Right (Final result)  Result time 10/08/17 18:02:30    Final result by Cachorro Asif MD (10/08/17 18:02:30)                 Impression:       1.  No evidence of fracture in the region of the right thumb.    2.  Lucency involving the head of the fourth proximal phalanx.  The findings may represent a nondisplaced fracture.  Suggest correlation with clinical examination findings and point tenderness.              Electronically signed by: CACHORRO ASIF MD  Date:     10/08/17  Time:    18:02              Narrative:    Exam: [id number]   10/08/17  17:46:24 [id number] ([facility]) : XR HAND COMPLETE 3 VIEW RIGHT    Technique:    3 x-ray views of the right hand.    Comparison:     None     Findings:      There is a lucency involving the head of the fourth proximal phalanx with extension into the intra-articular surface.  The remaining bone mineralization is within normal limits.  The joint spaces are maintained.  The soft tissues are unremarkable.                                                   ED Course        Medical decision making   Chief complaint:right thumb pain and injury LPTA  Differential diagnosis:stain, sprain, and fracture  Treatment in the ED Physical Exam,   Patient reports feeling better after splint placed.    Discussed imaging results.    Fill and take prescriptions as directed.  Return to the ED if symptoms worsen or do not resolve.   Answered questions and discussed discharge plan.    Patient feels much better and is ready for discharge.  Follow up with PCP in 1 days.    Clinical Impression:   The primary encounter diagnosis was Thumb pain, right. A diagnosis of Contusion of right hand, initial encounter was also pertinent to this visit.                           Jacki Poon DO  10/09/17 [id number]

## 2018-06-22 ENCOUNTER — HOSPITAL ENCOUNTER (EMERGENCY)
Facility: HOSPITAL | Age: 33
Discharge: HOME | End: 2018-06-22
Attending: INTERNAL MEDICINE

## 2018-06-22 VITALS
SYSTOLIC BLOOD PRESSURE: 120 MMHG | TEMPERATURE: 98 F | HEIGHT: 63 IN | BODY MASS INDEX: 36.32 KG/M2 | WEIGHT: 205 LBS | HEART RATE: 60 BPM | DIASTOLIC BLOOD PRESSURE: 78 MMHG | OXYGEN SATURATION: 98 % | RESPIRATION RATE: 16 BRPM

## 2018-06-22 DIAGNOSIS — H10.32 ACUTE CONJUNCTIVITIS OF LEFT EYE, UNSPECIFIED ACUTE CONJUNCTIVITIS TYPE: Primary | ICD-10-CM

## 2018-06-22 PROCEDURE — 99284 EMERGENCY DEPT VISIT MOD MDM: CPT

## 2018-06-22 PROCEDURE — 25000003 PHARM REV CODE 250: Performed by: INTERNAL MEDICINE

## 2018-06-22 RX ORDER — TOBRAMYCIN AND DEXAMETHASONE 3; 1 MG/ML; MG/ML
2 SUSPENSION/ DROPS OPHTHALMIC
Qty: 5 ML | Refills: 0 | Status: SHIPPED | OUTPATIENT
Start: 2018-06-22 | End: 2018-06-27

## 2018-06-22 RX ORDER — TOBRAMYCIN AND DEXAMETHASONE 3; 1 MG/ML; MG/ML
2 SUSPENSION/ DROPS OPHTHALMIC
Status: COMPLETED | OUTPATIENT
Start: 2018-06-22 | End: 2018-06-22

## 2018-06-22 RX ADMIN — TOBRAMYCIN AND DEXAMETHASONE 2 DROP: 3; 1 SUSPENSION/ DROPS OPHTHALMIC at 01:06

## 2018-07-10 NOTE — ED PROVIDER NOTES
Encounter Date: 6/22/2018       History     Chief Complaint   Patient presents with    Eye Pain     pt states that his left eye started burning and draining. Unknown injury     The history is provided by the patient. No  was used.   Eye Pain    This is a new problem. The current episode started yesterday. The problem occurs intermittently. The problem has been unchanged. The left eye is affected. There was no injury mechanism. The pain is at a severity of 3/10. There is no history of trauma to the eye. Associated symptoms include discharge. He has tried nothing for the symptoms.     Review of patient's allergies indicates:  No Known Allergies  Past Medical History:   Diagnosis Date    Gastric ulcer due to Helicobacter pylori     Gastric ulcer, acute     GERD (gastroesophageal reflux disease)      Past Surgical History:   Procedure Laterality Date    HERNIA REPAIR       History reviewed. No pertinent family history.  Social History   Substance Use Topics    Smoking status: Current Some Day Smoker     Types: Vaping with nicotine    Smokeless tobacco: Never Used    Alcohol use Yes     Review of Systems   Eyes: Positive for pain and discharge.   All other systems reviewed and are negative.      Physical Exam     Initial Vitals [06/22/18 0054]   BP Pulse Resp Temp SpO2   120/78 60 16 98.1 °F (36.7 °C) 98 %      MAP       --         Physical Exam    Nursing note and vitals reviewed.  Constitutional: He appears well-developed and well-nourished.   HENT:   Head: Normocephalic and atraumatic.   Right Ear: External ear normal.   Left Ear: External ear normal.   Eyes: Conjunctivae and EOM are normal. Right eye exhibits no discharge (clear). Left eye exhibits discharge (clear).   Left conjunctival injection   Neck: Normal range of motion.   Cardiovascular: Normal rate, regular rhythm and normal heart sounds.   Pulmonary/Chest: Breath sounds normal. No respiratory distress. He has no wheezes.    Abdominal: Soft. Bowel sounds are normal. He exhibits no distension.   Musculoskeletal: Normal range of motion.   Neurological: He is alert.   Skin: Skin is warm and dry.   Psychiatric: He has a normal mood and affect. Thought content normal.         ED Course   Procedures  Labs Reviewed - No data to display       Imaging Results    None                               Clinical Impression:   The encounter diagnosis was Acute conjunctivitis of left eye, unspecified acute conjunctivitis type.      Disposition:   Disposition: Discharged  Condition: Stable                        Elvin Lux MD  07/10/18 0132

## 2019-02-01 ENCOUNTER — OFFICE VISIT (OUTPATIENT)
Dept: INTERNAL MEDICINE | Facility: CLINIC | Age: 34
End: 2019-02-01
Payer: COMMERCIAL

## 2019-02-01 VITALS
HEART RATE: 76 BPM | TEMPERATURE: 99 F | OXYGEN SATURATION: 97 % | BODY MASS INDEX: 34.73 KG/M2 | HEIGHT: 63 IN | WEIGHT: 196 LBS | DIASTOLIC BLOOD PRESSURE: 70 MMHG | SYSTOLIC BLOOD PRESSURE: 128 MMHG

## 2019-02-01 DIAGNOSIS — H53.9 CHANGES IN VISION: ICD-10-CM

## 2019-02-01 DIAGNOSIS — L21.9 SEBORRHEIC DERMATITIS: ICD-10-CM

## 2019-02-01 DIAGNOSIS — L98.9 SKIN LESION: ICD-10-CM

## 2019-02-01 DIAGNOSIS — E66.09 CLASS 1 OBESITY DUE TO EXCESS CALORIES WITHOUT SERIOUS COMORBIDITY WITH BODY MASS INDEX (BMI) OF 34.0 TO 34.9 IN ADULT: ICD-10-CM

## 2019-02-01 DIAGNOSIS — Z00.00 ANNUAL PHYSICAL EXAM: Primary | ICD-10-CM

## 2019-02-01 PROBLEM — E66.811 CLASS 1 OBESITY DUE TO EXCESS CALORIES WITHOUT SERIOUS COMORBIDITY WITH BODY MASS INDEX (BMI) OF 34.0 TO 34.9 IN ADULT: Status: ACTIVE | Noted: 2019-02-01

## 2019-02-01 PROCEDURE — 99385 PR PREVENTIVE VISIT,NEW,18-39: ICD-10-PCS | Mod: S$GLB,,, | Performed by: FAMILY MEDICINE

## 2019-02-01 PROCEDURE — 99999 PR PBB SHADOW E&M-EST. PATIENT-LVL IV: ICD-10-PCS | Mod: PBBFAC,,, | Performed by: FAMILY MEDICINE

## 2019-02-01 PROCEDURE — 99999 PR PBB SHADOW E&M-EST. PATIENT-LVL IV: CPT | Mod: PBBFAC,,, | Performed by: FAMILY MEDICINE

## 2019-02-01 PROCEDURE — 99385 PREV VISIT NEW AGE 18-39: CPT | Mod: S$GLB,,, | Performed by: FAMILY MEDICINE

## 2019-02-01 RX ORDER — KETOCONAZOLE 20 MG/G
CREAM TOPICAL 2 TIMES DAILY
Qty: 30 G | Refills: 2 | Status: SHIPPED | OUTPATIENT
Start: 2019-02-01 | End: 2019-06-12

## 2019-02-01 NOTE — PROGRESS NOTES
Subjective:      Patient ID: Cristo Ruff is a 33 y.o. male.    Chief Complaint: Establish Care      HPI:  Cristo Ruff is a 33 year old male with history of gastric ulcer 2/2 H. Pylori, GERD, obesity, and tobacco use who presents to clinic today to establish care.    Stressed lately, improving.  Went through a break up recently.  Did have decreased appetite with associated weight loss--improving.  Starting to talk to his ex again.  Has supportive family and friends.    Complains of skin lesion to low back.  Hyperpigmented.  Present for years.  Denies any recent changes.  Concerned about possible skin cancer.    GERD:  Not currently taking anything for this.  Stable lately.    Obesity:  BMI 34.72.  Previously 215 lbs, down to 183, then back to 196.  Not exercising outside of work.    Tobacco use:  Stopped vaping 2 months ago.  Quit smoking cigarettes 2 years ago; smoked 1-2 cigarettes per day since age 18 off and on.    Health Care Maintenance:  Influenza vaccination:  October 2018; done at [facility]  Last tetanus booster:  Will get today      Past Medical History:   Diagnosis Date    Gastric ulcer due to Helicobacter pylori     Gastric ulcer, acute     GERD (gastroesophageal reflux disease)        Past Surgical History:   Procedure Laterality Date    HERNIA REPAIR         Family History   Problem Relation Age of Onset    Hypertension Mother     Diabetes Mellitus Mother     Kidney failure Mother     Hypertension Father     Diabetes Mellitus Father     Hypertension Sister        Social History     Socioeconomic History    Marital status: Single     Spouse name: None    Number of children: None    Years of education: None    Highest education level: None   Social Needs    Financial resource strain: None    Food insecurity - worry: None    Food insecurity - inability: None    Transportation needs - medical: None    Transportation needs - non-medical: None   Occupational History     "Occupation:     Occupation: Automotive detailing   Tobacco Use    Smoking status: Former Smoker    Smokeless tobacco: Never Used   Substance and Sexual Activity    Alcohol use: Yes     Frequency: 2-4 times a month     Drinks per session: 1 or 2    Drug use: No    Sexual activity: Yes     Partners: Female     Birth control/protection: None   Other Topics Concern    None   Social History Narrative    None       Review of Systems   Constitutional: Negative for chills, fatigue and fever.   HENT: Negative for congestion, hearing loss, nosebleeds, rhinorrhea, sore throat and trouble swallowing.    Eyes: Positive for visual disturbance. Negative for pain.        Problems with distance vision, now starting to have problems with near vision.   Respiratory: Negative for cough, shortness of breath and wheezing.    Cardiovascular: Negative for chest pain and palpitations.   Gastrointestinal: Negative for abdominal distention, abdominal pain, constipation, diarrhea, nausea and vomiting.   Genitourinary: Negative for difficulty urinating, dysuria, frequency, hematuria and urgency.   Musculoskeletal: Negative for arthralgias, back pain and myalgias.   Skin: Negative for color change and rash.   Neurological: Negative for dizziness, syncope, speech difficulty, weakness, numbness and headaches.   Psychiatric/Behavioral: Negative for agitation, behavioral problems and confusion. The patient is not nervous/anxious.      Objective:     Vitals:    02/01/19 1407   BP: 128/70   BP Location: Left arm   Patient Position: Sitting   BP Method: Large (Manual)   Pulse: 76   Temp: 99.1 °F (37.3 °C)   TempSrc: Oral   SpO2: 97%   Weight: 88.9 kg (196 lb)   Height: 5' 3" (1.6 m)       Physical Exam   Constitutional: He appears well-developed and well-nourished. He is cooperative. No distress.   HENT:   Head: Normocephalic and atraumatic.   Right Ear: Hearing and external ear normal.   Left Ear: Hearing and external ear normal. "   Nose: Nose normal. No rhinorrhea. No epistaxis.   Mouth/Throat: Oropharynx is clear and moist and mucous membranes are normal. No oral lesions.   Eyes: Conjunctivae, EOM and lids are normal. Pupils are equal, round, and reactive to light. Right eye exhibits no discharge. Left eye exhibits no discharge.   Neck: Trachea normal and normal range of motion. Neck supple. No tracheal deviation present.   Cardiovascular: Normal rate, regular rhythm and normal heart sounds. Exam reveals no gallop and no friction rub.   No murmur heard.  Pulmonary/Chest: Effort normal and breath sounds normal. No respiratory distress. He has no wheezes. He has no rales.   Abdominal: Soft. Bowel sounds are normal. He exhibits no distension. There is no tenderness. There is no rebound and no guarding.   Musculoskeletal: Normal range of motion. He exhibits no edema or deformity.   Lymphadenopathy:     He has no cervical adenopathy.   Neurological: He is alert. No cranial nerve deficit. He exhibits normal muscle tone.   Skin: Skin is warm and dry. Rash noted.        Psychiatric: He has a normal mood and affect. His speech is normal and behavior is normal. Judgment and thought content normal. Cognition and memory are normal.   Nursing note and vitals reviewed.     Assessment:      1. Annual physical exam    2. Seborrheic dermatitis    3. Skin lesion    4. Changes in vision    5. Class 1 obesity due to excess calories without serious comorbidity with body mass index (BMI) of 34.0 to 34.9 in adult      Plan:   Cristo was seen today for establish care.    Diagnoses and all orders for this visit:    Annual physical exam  -     CBC auto differential; Future  -     Comprehensive metabolic panel; Future  -     Lipid panel; Future    Seborrheic dermatitis  -     Start ketoconazole (NIZORAL) 2 % cream; Apply topically 2 (two) times daily.  Return to clinic if no improvement in 1 month.    Skin lesion  -     Ambulatory Referral to Dermatology for skin  check.  Lesion appears consistent with dermatofibroma.  Reassurance provided to patient.    Changes in vision  -     Ambulatory Referral to Optometry    Class 1 obesity due to excess calories without serious comorbidity with body mass index (BMI) of 34.0 to 34.9 in adult        -     Encouraged increased daily aerobic exercise and weight loss.

## 2019-02-09 ENCOUNTER — LAB VISIT (OUTPATIENT)
Dept: LAB | Facility: HOSPITAL | Age: 34
End: 2019-02-09
Payer: COMMERCIAL

## 2019-02-09 DIAGNOSIS — Z00.00 ANNUAL PHYSICAL EXAM: ICD-10-CM

## 2019-02-09 LAB
ALBUMIN SERPL BCP-MCNC: 4.1 G/DL
ALP SERPL-CCNC: 66 U/L
ALT SERPL W/O P-5'-P-CCNC: 20 U/L
ANION GAP SERPL CALC-SCNC: 6 MMOL/L
AST SERPL-CCNC: 17 U/L
BASOPHILS # BLD AUTO: 0.05 K/UL
BASOPHILS NFR BLD: 0.6 %
BILIRUB SERPL-MCNC: 0.5 MG/DL
BUN SERPL-MCNC: 15 MG/DL
CALCIUM SERPL-MCNC: 10.3 MG/DL
CHLORIDE SERPL-SCNC: 104 MMOL/L
CHOLEST SERPL-MCNC: 231 MG/DL
CHOLEST/HDLC SERPL: 4.9 {RATIO}
CO2 SERPL-SCNC: 29 MMOL/L
CREAT SERPL-MCNC: 1.1 MG/DL
DIFFERENTIAL METHOD: ABNORMAL
EOSINOPHIL # BLD AUTO: 0.3 K/UL
EOSINOPHIL NFR BLD: 3.9 %
ERYTHROCYTE [DISTWIDTH] IN BLOOD BY AUTOMATED COUNT: 15 %
EST. GFR  (AFRICAN AMERICAN): >60 ML/MIN/1.73 M^2
EST. GFR  (NON AFRICAN AMERICAN): >60 ML/MIN/1.73 M^2
GLUCOSE SERPL-MCNC: 104 MG/DL
HCT VFR BLD AUTO: 46.9 %
HDLC SERPL-MCNC: 47 MG/DL
HDLC SERPL: 20.3 %
HGB BLD-MCNC: 14.1 G/DL
IMM GRANULOCYTES # BLD AUTO: 0.02 K/UL
IMM GRANULOCYTES NFR BLD AUTO: 0.2 %
LDLC SERPL CALC-MCNC: 167.6 MG/DL
LYMPHOCYTES # BLD AUTO: 3.3 K/UL
LYMPHOCYTES NFR BLD: 39.1 %
MCH RBC QN AUTO: 25.8 PG
MCHC RBC AUTO-ENTMCNC: 30.1 G/DL
MCV RBC AUTO: 86 FL
MONOCYTES # BLD AUTO: 0.7 K/UL
MONOCYTES NFR BLD: 7.6 %
NEUTROPHILS # BLD AUTO: 4.1 K/UL
NEUTROPHILS NFR BLD: 48.6 %
NONHDLC SERPL-MCNC: 184 MG/DL
NRBC BLD-RTO: 0 /100 WBC
PLATELET # BLD AUTO: 227 K/UL
PMV BLD AUTO: 12.4 FL
POTASSIUM SERPL-SCNC: 4.6 MMOL/L
PROT SERPL-MCNC: 8 G/DL
RBC # BLD AUTO: 5.46 M/UL
SODIUM SERPL-SCNC: 139 MMOL/L
TRIGL SERPL-MCNC: 82 MG/DL
WBC # BLD AUTO: 8.52 K/UL

## 2019-02-09 PROCEDURE — 80061 LIPID PANEL: CPT

## 2019-02-09 PROCEDURE — 36415 COLL VENOUS BLD VENIPUNCTURE: CPT | Mod: PO

## 2019-02-09 PROCEDURE — 80053 COMPREHEN METABOLIC PANEL: CPT

## 2019-02-09 PROCEDURE — 85025 COMPLETE CBC W/AUTO DIFF WBC: CPT

## 2019-02-13 ENCOUNTER — TELEPHONE (OUTPATIENT)
Dept: OPTOMETRY | Facility: CLINIC | Age: 34
End: 2019-02-13

## 2019-02-13 NOTE — TELEPHONE ENCOUNTER
[employer] as of 19:       Member Name : YAQUELIN ESTRELLA GALINA  ID : [id number]  Group : [employer]   Patient Name : YAQUELIN MOJICA  Relationship : MEMBER   : 1985     Authorization Issued       Authorization Number: [id number]    Issue Date: 2019    Expiration Date: 3/6/2019    Services: Eye Examination    Examination Copayment: $0.00

## 2019-02-18 ENCOUNTER — TELEPHONE (OUTPATIENT)
Dept: INTERNAL MEDICINE | Facility: CLINIC | Age: 34
End: 2019-02-18

## 2019-02-18 NOTE — TELEPHONE ENCOUNTER
----- Message from Brit Garcia sent at 2/18/2019  3:51 PM CST -----  Contact: self/ [phone]  Patient would like to get test results  Name of test (lab, mammo, etc.):   Lab   Date of test:  2/9  Ordering provider: Jarrett  Where was the test performed:    Would the patient rather a call back or a response via [facility]?:    Comments:

## 2019-02-18 NOTE — TELEPHONE ENCOUNTER
Please notify patient his total cholesterol was elevated at 231 (normal < 200) and his LDL or bad cholesterol was elevated at 167 (normal < 159).  No statin medication recommended at this time though I would strongly recommend a low cholesterol diet and increased daily aerobic exercise.  Mediterranean style diet is good for low cholesterol---reduced overall meat consumption, increased vegetable consumption, substituting lean proteins like fish/poultry in the place of red meats, and substituting healthy oils like extra virgin olive oil in the place of butters/grease when cooking.  Other labs unremarkable.

## 2019-03-08 ENCOUNTER — TELEPHONE (OUTPATIENT)
Dept: INTERNAL MEDICINE | Facility: CLINIC | Age: 34
End: 2019-03-08

## 2019-03-08 NOTE — TELEPHONE ENCOUNTER
"Lab results reviewed.  CBC did show mild abnormalities including mildly decreased MCH/MCHC and mildly elevated RDW.  This may indicate the size of the patients red blood cells has some variance and the average concentration of hemoglobin in his red blood cells is slightly low but his overall hemoglobin level is normal--no anemia.  Can continue to monitor this on routine annual labs.  Total cholesterol elevated at 231 (normal < 200) and LDL or "bad" cholesterol elevated at 167.6 (normal ).  Would recommend a low fat/low cholesterol diet such as the Mediterranean style die (less meat, more vegetables, baked poultry/fish instead of red meats, healthy oils like extra virgin olive oil instead of butters/grease).  Would also recommend increased daily aerobic exercise.  Will recheck on annual labs in 1 year.  Please notify patient of above.  "

## 2019-06-12 ENCOUNTER — HOSPITAL ENCOUNTER (EMERGENCY)
Facility: HOSPITAL | Age: 34
Discharge: HOME | End: 2019-06-12
Attending: EMERGENCY MEDICINE

## 2019-06-12 VITALS
HEIGHT: 63 IN | RESPIRATION RATE: 18 BRPM | HEART RATE: 76 BPM | OXYGEN SATURATION: 97 % | WEIGHT: 209 LBS | BODY MASS INDEX: 37.03 KG/M2 | TEMPERATURE: 98 F | SYSTOLIC BLOOD PRESSURE: 128 MMHG | DIASTOLIC BLOOD PRESSURE: 78 MMHG

## 2019-06-12 DIAGNOSIS — J20.9 ACUTE BRONCHITIS, UNSPECIFIED ORGANISM: Primary | ICD-10-CM

## 2019-06-12 PROCEDURE — 99284 EMERGENCY DEPT VISIT MOD MDM: CPT | Mod: 25,ER

## 2019-06-12 RX ORDER — BENZONATATE 100 MG/1
100 CAPSULE ORAL 3 TIMES DAILY PRN
Qty: 30 CAPSULE | Refills: 0 | Status: SHIPPED | OUTPATIENT
Start: 2019-06-12 | End: 2019-06-22

## 2019-06-12 RX ORDER — AZITHROMYCIN 250 MG/1
250 TABLET, FILM COATED ORAL DAILY
Qty: 6 TABLET | Refills: 0 | Status: SHIPPED | OUTPATIENT
Start: 2019-06-12 | End: 2020-05-09 | Stop reason: ALTCHOICE

## 2019-06-12 NOTE — ED PROVIDER NOTES
Encounter Date: 6/12/2019    SCRIBE #1 NOTE: I, Rebecca Zhou, am scribing for, and in the presence of,  MARYELLEN Ellison. I have scribed the following portions of the note - Other sections scribed: HPI, ROS, PE.       History     Chief Complaint   Patient presents with    Back Pain     pt reports he began having mid to lower back pain x 1 week. pt reports he has also had a cough and congestion x 3 weeks. pt c/o pain in mid back when taking a deep breath. pt reports taking flexeril last night with mild relief     Cristo Ruff is a 34 y.o. male who presents to the ED complaining of back pain x1 week. He took Flexeril last night with mild relief.  Also complains of cough and congestion x3 weeks.He reports a green sputum. Reports sharp pain in his back with deep breaths. He went to [facility] 3 weeks ago (5/29/2019 per medical records). Per patient he was given Abx, which he never filled. He was also given Mucinex D. He is unsure of his diagnosis. He denies fever or chills. He is a former smoker. Denies recent falls or recent surgeries.        The history is provided by the patient, the spouse and medical records. No  was used.   Back Pain    This is a new problem. The current episode started several days ago. The problem occurs intermittently. The problem has been unchanged. The pain is associated with no known injury. The pain is present in the thoracic spine and lumbar spine. Exacerbated by: cough. Pertinent negatives include no chest pain, no fever and no headaches. He has tried muscle relaxants for the symptoms. The treatment provided mild relief.     Review of patient's allergies indicates:  No Known Allergies  Past Medical History:   Diagnosis Date    Gastric ulcer due to Helicobacter pylori     Gastric ulcer, acute     GERD (gastroesophageal reflux disease)      Past Surgical History:   Procedure Laterality Date    HERNIA REPAIR       Family History   Problem Relation Age of  Onset    Hypertension Mother     Diabetes Mellitus Mother     Kidney failure Mother     Hypertension Father     Diabetes Mellitus Father     Hypertension Sister      Social History     Tobacco Use    Smoking status: Former Smoker    Smokeless tobacco: Never Used   Substance Use Topics    Alcohol use: Yes     Frequency: 2-4 times a month     Drinks per session: 1 or 2    Drug use: No     Review of Systems   Constitutional: Negative.  Negative for fever.   HENT: Positive for congestion.    Eyes: Negative.    Respiratory: Positive for cough. Negative for shortness of breath.    Cardiovascular: Negative.  Negative for chest pain.   Gastrointestinal: Negative.    Endocrine: Negative.    Genitourinary: Negative.    Musculoskeletal: Positive for back pain.   Skin: Negative.    Allergic/Immunologic: Negative.    Neurological: Negative.  Negative for headaches.   Hematological: Negative.    Psychiatric/Behavioral: Negative.    All other systems reviewed and are negative.      Physical Exam     Initial Vitals [06/12/19 1620]   BP Pulse Resp Temp SpO2   128/78 76 18 97.8 °F (36.6 °C) 97 %      MAP       --         Physical Exam    Nursing note and vitals reviewed.  Constitutional: He appears well-developed.   HENT:   Right Ear: External ear normal.   Left Ear: External ear normal.   Nose: Nose normal.   Mouth/Throat: Oropharynx is clear and moist.   Eyes: Conjunctivae are normal.   Neck: Normal range of motion. Neck supple.   Cardiovascular: Normal rate, regular rhythm, normal heart sounds and intact distal pulses.   Pulmonary/Chest: Effort normal and breath sounds normal. No respiratory distress.   Abdominal: Soft. There is no tenderness. There is no CVA tenderness.   Musculoskeletal: Normal range of motion. He exhibits no edema or tenderness.        Back:    Splint to right hand. He punched a wall last night.    Neurological: He is alert and oriented to person, place, and time.   Skin: Skin is warm and dry. No rash  noted.   Psychiatric: He has a normal mood and affect.         ED Course   Procedures  Labs Reviewed   POCT URINALYSIS W/O SCOPE          Imaging Results          X-Ray Chest PA And Lateral (Final result)  Result time 06/12/19 16:58:49    Final result by Cachorro Asif MD (06/12/19 16:58:49)                 Impression:      Subsegmental atelectasis in the left lower lobe.  No focal consolidation.      Electronically signed by: Cachorro Asif MD  Date:    06/12/2019  Time:    16:58             Narrative:    EXAMINATION:  XR CHEST PA AND LATERAL    CLINICAL HISTORY:  cough;    TECHNIQUE:  PA and lateral views of the chest were performed.    COMPARISON:  11/20/2016.    FINDINGS:  The trachea is unremarkable.  The cardiomediastinal silhouette is within normal limits.  The hilar structures are unremarkable.  There is no evidence of free air beneath the hemidiaphragms.  There are no pleural effusions.  There is no evidence of a pneumothorax.  There is no evidence of pneumomediastinum.  There is subsegmental atelectasis in the left lower lobe.  The osseous structures are unremarkable.                                 Medical Decision Making:   History:   Old Records Summarized: records from another hospital.       <> Summary of Records: Review chart from [facility]. Pt was seen in the ED on 5/29/2019. He reports cough started 7 days prior. He had a normal chest x-ray. Discharged with Mucinex D and Afrin. Pt was also given Decadron 8 mg IM.  Initial Assessment:   This is a 34 y.o. nontoxic male who presents to the ED with complaints of back pain x1 week. Reports cough x3 weeks. Back pain worse with cough.  Differential Diagnosis:   Acute sinusitis, URI, bronchitis, pneumonia  Clinical Tests:   Lab Tests: Ordered and Reviewed  Radiological Study: Ordered and Reviewed  ED Management:  Discharged with Z-pack and Tessalon Perles.  Follow-up with PCP in 1-2 days.  Return ED for worsening of symptoms.            Scribe Attestation:    Scribe #1: I performed the above scribed service and the documentation accurately describes the services I performed. I attest to the accuracy of the note.    This document was produced by a scribe under my direction and in my presence. I agree with the content of the note and have made any necessary edits.     MARYELLEN Ellison    06/12/2019 10:05 PM           Clinical Impression:     1. Acute bronchitis, unspecified organism                                   MARYELLEN Thacker  06/12/19 3578

## 2020-04-24 ENCOUNTER — HOSPITAL ENCOUNTER (EMERGENCY)
Facility: HOSPITAL | Age: 35
Discharge: HOME | End: 2020-04-24
Attending: EMERGENCY MEDICINE
Payer: OTHER GOVERNMENT

## 2020-04-24 VITALS
HEIGHT: 66 IN | OXYGEN SATURATION: 98 % | DIASTOLIC BLOOD PRESSURE: 70 MMHG | HEART RATE: 67 BPM | WEIGHT: 205 LBS | RESPIRATION RATE: 15 BRPM | SYSTOLIC BLOOD PRESSURE: 120 MMHG | TEMPERATURE: 98 F | BODY MASS INDEX: 32.95 KG/M2

## 2020-04-24 DIAGNOSIS — R07.89 ATYPICAL CHEST PAIN: Primary | ICD-10-CM

## 2020-04-24 LAB
ALBUMIN SERPL-MCNC: 3.7 G/DL (ref 3.3–5.5)
ALP SERPL-CCNC: 54 U/L (ref 42–141)
BILIRUB SERPL-MCNC: 0.5 MG/DL (ref 0.2–1.6)
BUN SERPL-MCNC: 10 MG/DL (ref 7–22)
CALCIUM SERPL-MCNC: 9.7 MG/DL (ref 8–10.3)
CHLORIDE SERPL-SCNC: 105 MMOL/L (ref 98–108)
CREAT SERPL-MCNC: 0.9 MG/DL (ref 0.6–1.2)
GLUCOSE SERPL-MCNC: 102 MG/DL (ref 73–118)
POC ALT (SGPT): 26 U/L (ref 10–47)
POC AST (SGOT): 42 U/L (ref 11–38)
POC CARDIAC TROPONIN I: 0 NG/ML
POC TCO2: 28 MMOL/L (ref 18–33)
POTASSIUM BLD-SCNC: 4.4 MMOL/L (ref 3.6–5.1)
PROTEIN, POC: 7.2 G/DL (ref 6.4–8.1)
SAMPLE: NORMAL
SARS-COV-2 RDRP RESP QL NAA+PROBE: NEGATIVE
SODIUM BLD-SCNC: 141 MMOL/L (ref 128–145)

## 2020-04-24 PROCEDURE — 99284 EMERGENCY DEPT VISIT MOD MDM: CPT | Mod: 25,ER

## 2020-04-24 PROCEDURE — 25000003 PHARM REV CODE 250: Mod: ER | Performed by: EMERGENCY MEDICINE

## 2020-04-24 PROCEDURE — 80053 COMPREHEN METABOLIC PANEL: CPT | Mod: ER

## 2020-04-24 PROCEDURE — 84484 ASSAY OF TROPONIN QUANT: CPT | Mod: ER

## 2020-04-24 PROCEDURE — 93010 ELECTROCARDIOGRAM REPORT: CPT | Mod: ,,, | Performed by: INTERNAL MEDICINE

## 2020-04-24 PROCEDURE — 85025 COMPLETE CBC W/AUTO DIFF WBC: CPT | Mod: ER

## 2020-04-24 PROCEDURE — 93010 EKG 12-LEAD: ICD-10-PCS | Mod: ,,, | Performed by: INTERNAL MEDICINE

## 2020-04-24 PROCEDURE — 93005 ELECTROCARDIOGRAM TRACING: CPT | Mod: ER

## 2020-04-24 PROCEDURE — U0002 COVID-19 LAB TEST NON-CDC: HCPCS

## 2020-04-24 RX ORDER — FAMOTIDINE 20 MG/1
20 TABLET, FILM COATED ORAL 2 TIMES DAILY
Qty: 60 TABLET | Refills: 0 | OUTPATIENT
Start: 2020-04-24 | End: 2020-05-09

## 2020-04-24 RX ADMIN — LIDOCAINE HYDROCHLORIDE: 20 SOLUTION ORAL; TOPICAL at 12:04

## 2020-04-24 NOTE — ED PROVIDER NOTES
Encounter Date: 4/24/2020       History     Chief Complaint   Patient presents with    Chest Pain     35 y.o. male Past Medical History:  No date: Gastric ulcer due to Helicobacter pylori  No date: Gastric ulcer, acute  No date: GERD (gastroesophageal reflux disease)     Presents for evaluation of L chest pressure, pt notes that his chest has been hurting on/off for 3 days. Notes pain has been constant since 9am today. It is very mild, no associated symptoms. Notes an occasional mild cough but no sob.        Review of patient's allergies indicates:  No Known Allergies  Past Medical History:   Diagnosis Date    Gastric ulcer due to Helicobacter pylori     Gastric ulcer, acute     GERD (gastroesophageal reflux disease)      Past Surgical History:   Procedure Laterality Date    HERNIA REPAIR       Family History   Problem Relation Age of Onset    Hypertension Mother     Diabetes Mellitus Mother     Kidney failure Mother     Hypertension Father     Diabetes Mellitus Father     Hypertension Sister      Social History     Tobacco Use    Smoking status: Former Smoker    Smokeless tobacco: Never Used   Substance Use Topics    Alcohol use: Yes     Frequency: 2-4 times a month     Drinks per session: 1 or 2    Drug use: No     Review of Systems   Constitutional: Negative for fever.   HENT: Negative for sore throat.    Respiratory: Positive for cough. Negative for shortness of breath.    Cardiovascular: Positive for chest pain.   Gastrointestinal: Negative for nausea.   Genitourinary: Negative for dysuria.   Musculoskeletal: Negative for back pain.   Skin: Negative for rash.   Neurological: Negative for weakness.   Hematological: Does not bruise/bleed easily.   All other systems reviewed and are negative.      Physical Exam     Initial Vitals [04/24/20 1224]   BP Pulse Resp Temp SpO2   133/79 78 16 98.2 °F (36.8 °C) 98 %      MAP       --         Physical Exam    Nursing note and vitals  reviewed.  Constitutional: He appears well-developed and well-nourished.   HENT:   Head: Normocephalic and atraumatic.   Eyes: EOM are normal. Pupils are equal, round, and reactive to light.   Cardiovascular: Normal rate and regular rhythm.   Pulmonary/Chest: Effort normal.   Abdominal: He exhibits no distension.   Musculoskeletal: He exhibits no edema or tenderness.   Neurological: He is alert and oriented to person, place, and time. No cranial nerve deficit.   Skin: Skin is warm and dry.   Psychiatric: He has a normal mood and affect.         ED Course   Procedures  Labs Reviewed   SARS-COV-2 RNA AMPLIFICATION, QUAL   POCT CBC   POCT CMP   POCT TROPONIN     EKG Readings: (Independently Interpreted)   Hr 68, sinus, nl axis/intervals, no ajson/twi, non acute, no stemi.        Imaging Results    None                                  low suspicion for cardiac. Will do 1 set of enzymes, covid screen even though low risk, gi cocktail.  Gi cocktail has helped. Pt requests referral to cards and for sleep study given sleep apnea.    Clinical Impression:       ICD-10-CM ICD-9-CM   1. Atypical chest pain R07.89 786.59                                Pedro Pantoja MD  04/24/20 1874

## 2020-04-24 NOTE — ED TRIAGE NOTES
35 y.o. Male to ED with c.o. Intermittent 9/10 left sided chest pain, described as pressure x 3 days. Patient reports he feels like he needs to belch and when he tries to, it makes the pain worse. Patient denies n/v, denies fever/chills, denies cough. Patient endorses diarrhea x 2 days.

## 2020-04-24 NOTE — DISCHARGE INSTRUCTIONS
Thank you for coming to our Emergency Department today. It is important to remember that some problems are difficult to diagnose and may not be found during your first visit. Be sure to follow up with your primary care doctor and review any labs/imaging that was performed with them. If you do not have a primary care doctor, you may contact the one listed on your discharge paperwork or you may also call the [facility] Appointment Desk at [phone] to schedule an appointment with one.     All medications may potentially have side effects and it is impossible to predict which medications may give you side effects. If you feel that you are having a negative effect of any medication you should immediately stop taking them and seek medical attention.    Return to the ER with any questions/concerns, new/concerning symptoms, worsening or failure to improve. Do not drive or make any important decisions for 24 hours if you have received any pain medications, sedatives or mood altering drugs during your ER visit.

## 2020-05-09 ENCOUNTER — HOSPITAL ENCOUNTER (EMERGENCY)
Facility: HOSPITAL | Age: 35
Discharge: HOME | End: 2020-05-09
Attending: EMERGENCY MEDICINE
Payer: OTHER GOVERNMENT

## 2020-05-09 VITALS
HEART RATE: 66 BPM | HEIGHT: 64 IN | SYSTOLIC BLOOD PRESSURE: 123 MMHG | TEMPERATURE: 98 F | RESPIRATION RATE: 18 BRPM | WEIGHT: 216 LBS | BODY MASS INDEX: 36.88 KG/M2 | OXYGEN SATURATION: 98 % | DIASTOLIC BLOOD PRESSURE: 70 MMHG

## 2020-05-09 DIAGNOSIS — F41.1 ANXIETY AS ACUTE REACTION TO EXCEPTIONAL STRESS: Primary | ICD-10-CM

## 2020-05-09 DIAGNOSIS — F43.0 ANXIETY AS ACUTE REACTION TO EXCEPTIONAL STRESS: Primary | ICD-10-CM

## 2020-05-09 DIAGNOSIS — R07.9 CHEST PAIN: ICD-10-CM

## 2020-05-09 DIAGNOSIS — K21.9 GASTROESOPHAGEAL REFLUX DISEASE WITHOUT ESOPHAGITIS: ICD-10-CM

## 2020-05-09 LAB
ALBUMIN SERPL-MCNC: 4 G/DL (ref 3.3–5.5)
ALP SERPL-CCNC: 59 U/L (ref 42–141)
AMPHET+METHAMPHET UR QL: NEGATIVE
BARBITURATES UR QL SCN>200 NG/ML: NEGATIVE
BENZODIAZ UR QL SCN>200 NG/ML: NEGATIVE
BILIRUB SERPL-MCNC: 0.6 MG/DL (ref 0.2–1.6)
BUN SERPL-MCNC: 12 MG/DL (ref 7–22)
BZE UR QL SCN: NEGATIVE
CALCIUM SERPL-MCNC: 9.8 MG/DL (ref 8–10.3)
CANNABINOIDS UR QL SCN: NEGATIVE
CHLORIDE SERPL-SCNC: 104 MMOL/L (ref 98–108)
CREAT SERPL-MCNC: 1 MG/DL (ref 0.6–1.2)
CREAT UR-MCNC: 234.3 MG/DL (ref 23–375)
GLUCOSE SERPL-MCNC: 95 MG/DL (ref 73–118)
METHADONE UR QL SCN>300 NG/ML: NEGATIVE
OPIATES UR QL SCN: NEGATIVE
PCP UR QL SCN>25 NG/ML: NEGATIVE
POC ALT (SGPT): 26 U/L (ref 10–47)
POC AST (SGOT): 28 U/L (ref 11–38)
POC B-TYPE NATRIURETIC PEPTIDE: <5 PG/ML (ref 0–100)
POC CARDIAC TROPONIN I: 0 NG/ML
POC D-DI: 199 NG/ML (ref 0–450)
POC TCO2: 28 MMOL/L (ref 18–33)
POTASSIUM BLD-SCNC: 3.9 MMOL/L (ref 3.6–5.1)
PROTEIN, POC: 7.5 G/DL (ref 6.4–8.1)
SAMPLE: NORMAL
SODIUM BLD-SCNC: 138 MMOL/L (ref 128–145)
TOXICOLOGY INFORMATION: NORMAL

## 2020-05-09 PROCEDURE — 99284 EMERGENCY DEPT VISIT MOD MDM: CPT | Mod: 25,ER

## 2020-05-09 PROCEDURE — 93010 EKG 12-LEAD: ICD-10-PCS | Mod: ,,, | Performed by: INTERNAL MEDICINE

## 2020-05-09 PROCEDURE — 80053 COMPREHEN METABOLIC PANEL: CPT | Mod: ER

## 2020-05-09 PROCEDURE — 93010 ELECTROCARDIOGRAM REPORT: CPT | Mod: ,,, | Performed by: INTERNAL MEDICINE

## 2020-05-09 PROCEDURE — 83880 ASSAY OF NATRIURETIC PEPTIDE: CPT | Mod: ER

## 2020-05-09 PROCEDURE — 25000003 PHARM REV CODE 250: Mod: ER | Performed by: EMERGENCY MEDICINE

## 2020-05-09 PROCEDURE — 93005 ELECTROCARDIOGRAM TRACING: CPT | Mod: ER

## 2020-05-09 PROCEDURE — 85379 FIBRIN DEGRADATION QUANT: CPT | Mod: ER

## 2020-05-09 PROCEDURE — 85025 COMPLETE CBC W/AUTO DIFF WBC: CPT | Mod: ER

## 2020-05-09 PROCEDURE — 80307 DRUG TEST PRSMV CHEM ANLYZR: CPT

## 2020-05-09 PROCEDURE — 84484 ASSAY OF TROPONIN QUANT: CPT | Mod: ER

## 2020-05-09 RX ORDER — HYDROXYZINE PAMOATE 50 MG/1
50 CAPSULE ORAL NIGHTLY PRN
Qty: 15 CAPSULE | Refills: 0 | Status: SHIPPED | OUTPATIENT
Start: 2020-05-09 | End: 2020-05-13

## 2020-05-09 RX ORDER — SYRING-NEEDL,DISP,INSUL,0.3 ML 29 G X1/2"
296 SYRINGE, EMPTY DISPOSABLE MISCELLANEOUS ONCE AS NEEDED
Qty: 295 ML | Refills: 0 | Status: SHIPPED | OUTPATIENT
Start: 2020-05-09 | End: 2020-05-09

## 2020-05-09 RX ORDER — FAMOTIDINE 20 MG/1
20 TABLET, FILM COATED ORAL 2 TIMES DAILY
Qty: 14 TABLET | Refills: 0 | OUTPATIENT
Start: 2020-05-09 | End: 2020-06-26

## 2020-05-09 RX ORDER — FLUTICASONE PROPIONATE 50 MCG
2 SPRAY, SUSPENSION (ML) NASAL DAILY
Qty: 16 G | Refills: 0 | Status: SHIPPED | OUTPATIENT
Start: 2020-05-09 | End: 2020-05-13

## 2020-05-09 RX ORDER — ASPIRIN 325 MG
325 TABLET ORAL
Status: COMPLETED | OUTPATIENT
Start: 2020-05-09 | End: 2020-05-09

## 2020-05-09 RX ORDER — MONTELUKAST SODIUM 10 MG/1
10 TABLET ORAL NIGHTLY
Qty: 30 TABLET | Refills: 0 | Status: SHIPPED | OUTPATIENT
Start: 2020-05-09 | End: 2020-05-13

## 2020-05-09 RX ORDER — PANTOPRAZOLE SODIUM 20 MG/1
20 TABLET, DELAYED RELEASE ORAL DAILY
Qty: 30 TABLET | Refills: 0 | Status: SHIPPED | OUTPATIENT
Start: 2020-05-09 | End: 2020-05-13

## 2020-05-09 RX ADMIN — ASPIRIN 325 MG ORAL TABLET 325 MG: 325 PILL ORAL at 09:05

## 2020-05-10 ENCOUNTER — NURSE TRIAGE (OUTPATIENT)
Dept: ADMINISTRATIVE | Facility: CLINIC | Age: 35
End: 2020-05-10

## 2020-05-10 NOTE — ED NOTES
"Pt aaox3. resp e/u. Skin wdp. Pt c/o falling asleep and being scared awake "like I didn't even know I fell asleep" and anxiety attacks x3 today. Pt reports intermittent cp for a long time. Has not followed up with cardiologist.   "

## 2020-05-10 NOTE — TELEPHONE ENCOUNTER
Reason for Disposition   Headache  (and neurologic deficit)    Additional Information   Negative: [1] SEVERE weakness (i.e., unable to walk or barely able to walk, requires support) AND [2] new onset or worsening   Negative: [1] Weakness (i.e., paralysis, loss of muscle strength) of the face, arm / hand, or leg / foot on one side of the body AND [2] sudden onset AND [3] present now   Negative: [1] Loss of speech or garbled speech AND [2] sudden onset AND [3] present now   Negative: Difficult to awaken or acting confused (e.g., disoriented, slurred speech)   Negative: Sounds like a life-threatening emergency to the triager   Negative: [1] Numbness (i.e., loss of sensation) of the face, arm / hand, or leg / foot on one side of the body AND [2] sudden onset AND [3] present now   Negative: Confusion, disorientation, or hallucinations is main symptom   Negative: Neck pain is main symptom (and having weakness, numbness, or tingling in arm / hand because of neck pain)   Negative: Back pain is main symptom (and having weakness, numbness, or tingling in leg because of back pain)   Negative: Hand pain is main symptom (and having mild weakness, numbness, or tingling in hand related to hand pain)   Negative: Dizziness is main symptom   Negative: Vision loss or change is main symptom   Negative: Followed a head injury within last 3 days   Negative: Followed a neck injury within last 3 days   Negative: [1] Tingling in both hands and/or feet AND [2] breathing faster than normal AND [3] feels similar to prior panic attack or hyperventilation episode   Negative: Weakness in both sides of the body or weakness all over    Protocols used: NEUROLOGIC DEFICIT-A-AH  pt called re seen in ED last pm and three weeks ago. seen for CP, kvng kowalski. V/D. this am woke with HA, HA x 3 days , T98.3, tingling in feet bilat suddenly today. No CP, some nausea. feeling a bit better than when seen in ED. HA ernie goes away .  hoarse. Negative for covid 19 virus three weeks ago.. rec ED due to HA, tingling. Pt agrees and states he has a ride. Call back with questions.

## 2020-05-10 NOTE — ED PROVIDER NOTES
Encounter Date: 5/9/2020    SCRIBE #1 NOTE: I, Gabi Lu, am scribing for, and in the presence of,  Dr. Carrol Dillard. I have scribed the following portions of the note - Other sections scribed: HPI, ROS, PE.       History     Chief Complaint   Patient presents with    Chest Pain     intermittent left anterior chest wall pain x 1 yr. reports that he has been seen with normal ekg's in the past. reports that his pain is worse today. also reports hx of anxiety. reports being sob today. reports vomiting x 1      Cristo Ruff is a 35 y.o. male who presents to the ED complaining of intermittent anxiety and panic attacks over the last few weeks. Patient reports latest panic attack x today with associated hyperventilation, chest pressure and palpitations. Endorses x1 vomiting x today. Mentions he works as a  at the hospital and has been having increased anxiety do due the COVID-19 pandemic. Quit tobacco x 1 year ago, but admits marijuana use. Denies LOC. Denies fever, abdominal pain and nausea. Denies SI/HI. Secondary complaint of acute nasal congestion and having the urge to belch but unable to do so. Denies rhinorrhea, PND, sore throat and cough.                   The history is provided by the patient. No  was used.     Review of patient's allergies indicates:  No Known Allergies  Past Medical History:   Diagnosis Date    Gastric ulcer due to Helicobacter pylori     Gastric ulcer, acute     GERD (gastroesophageal reflux disease)      Past Surgical History:   Procedure Laterality Date    HERNIA REPAIR       Family History   Problem Relation Age of Onset    Hypertension Mother     Diabetes Mellitus Mother     Kidney failure Mother     Hypertension Father     Diabetes Mellitus Father     Hypertension Sister      Social History     Tobacco Use    Smoking status: Former Smoker    Smokeless tobacco: Never Used   Substance Use Topics    Alcohol use: Yes      Frequency: 2-4 times a month     Drinks per session: 1 or 2    Drug use: No     Review of Systems   Constitutional: Negative for chills and fever.   HENT: Positive for congestion. Negative for postnasal drip, rhinorrhea and sore throat.    Eyes: Negative for redness.   Respiratory: Positive for shortness of breath. Negative for cough.    Cardiovascular: Positive for chest pain (pressure) and palpitations.   Gastrointestinal: Positive for vomiting. Negative for abdominal pain and nausea.   Skin: Negative for rash.   Neurological: Negative for syncope and headaches.   Psychiatric/Behavioral: Negative for self-injury and suicidal ideas. The patient is nervous/anxious.    All other systems reviewed and are negative.      Physical Exam     Initial Vitals [05/09/20 2059]   BP Pulse Resp Temp SpO2   (!) 144/78 76 18 98.3 °F (36.8 °C) 97 %      MAP       --         Physical Exam    Nursing note and vitals reviewed.  Constitutional: He appears well-developed and well-nourished. No distress.   HENT:   Head: Normocephalic and atraumatic.   Right Ear: External ear normal.   Left Ear: External ear normal.   Nose: Nose normal.   Eyes: Conjunctivae are normal.   Neck: Normal range of motion and phonation normal. Neck supple. No stridor present.   Cardiovascular: Normal rate, regular rhythm, normal heart sounds and intact distal pulses. Exam reveals no gallop and no friction rub.    No murmur heard.  Pulmonary/Chest: Effort normal and breath sounds normal. No stridor. No respiratory distress. He has no wheezes. He has no rhonchi. He has no rales.   Abdominal: Normal appearance.   Musculoskeletal: Normal range of motion. He exhibits no edema or tenderness.   Neurological: He is alert and oriented to person, place, and time. He has normal strength. No cranial nerve deficit or sensory deficit. Gait normal. GCS eye subscore is 4. GCS verbal subscore is 5. GCS motor subscore is 6.   Skin: Skin is warm and dry. No rash noted.    Psychiatric: He has a normal mood and affect. His behavior is normal.         ED Course   Procedures  Labs Reviewed   TROPONIN ISTAT   DRUG SCREEN PANEL, URINE EMERGENCY   POCT CBC   POCT CMP   POCT D DIMER   POCT TROPONIN   POCT B-TYPE NATRIURETIC PEPTIDE (BNP)   POCT CMP   POCT B-TYPE NATRIURETIC PEPTIDE (BNP)   POCT D DIMER     EKG Readings: (Independently Interpreted)   Initial Reading: No STEMI. Rhythm: Normal Sinus Rhythm. Heart Rate: 73 bpm. Ectopy: No Ectopy. Conduction: Normal. ST Segments: Normal ST Segments. T Waves: Normal. Axis: Normal. Clinical Impression: Normal Sinus Rhythm       Imaging Results          X-Ray Chest PA And Lateral (Final result)  Result time 05/09/20 21:46:06    Final result by Darek Jean Baptiste MD (05/09/20 21:46:06)                 Impression:      No acute cardiopulmonary process identified.      Electronically signed by: Darek Jean Baptiste MD  Date:    05/09/2020  Time:    21:46             Narrative:    EXAMINATION:  XR CHEST PA AND LATERAL    CLINICAL HISTORY:  Chest Pain;    TECHNIQUE:  PA and lateral views of the chest were performed.    COMPARISON:  04/24/2020.    FINDINGS:  Cardiac silhouette is normal in size.  Lungs are symmetrically expanded.  No evidence of focal consolidative process, pneumothorax, or significant effusion.  No acute osseous abnormality identified.                                 Medical Decision Making:   History:   Old Medical Records: I decided to obtain old medical records.  Independently Interpreted Test(s):   I have ordered and independently interpreted X-rays - see prior notes.  I have ordered and independently interpreted EKG Reading(s) - see prior notes  Clinical Tests:   Lab Tests: Ordered and Reviewed  Radiological Study: Ordered and Reviewed  Medical Tests: Ordered and Reviewed                                    Labs Reviewed  Admission on 05/09/2020, Discharged on 05/09/2020   Component Date Value Ref Range Status    Albumin, POC 05/09/2020  4.0  3.3 - 5.5 g/dL Final    Alkaline Phosphatase, POC 05/09/2020 59  42 - 141 U/L Final    ALT (SGPT), POC 05/09/2020 26  10 - 47 U/L Final    AST (SGOT), POC 05/09/2020 28  11 - 38 U/L Final    POC BUN 05/09/2020 12  7 - 22 mg/dL Final    Calcium, POC 05/09/2020 9.8  8 - 10.3 mg/dL Final    POC Chloride 05/09/2020 104  98 - 108 mmol/L Final    POC Creatinine 05/09/2020 1.0  0.6 - 1.2 mg/dL Final    POC Glucose 05/09/2020 95  73 - 118 mg/dL Final    POC Potassium 05/09/2020 3.9  3.6 - 5.1 mmol/L Final    POC Sodium 05/09/2020 138  128 - 145 mmol/L Final    Bilirubin 05/09/2020 0.6  0.2 - 1.6 mg/dL Final    POC TCO2 05/09/2020 28  18 - 33 mmol/L Final    Protein 05/09/2020 7.5  6.4 - 8.1 g/dL Final    POC Cardiac Troponin I 05/09/2020 0.00  <0.09 ng/mL Final    Sample 05/09/2020 unknown   Final    POC B-Type Natriuretic Peptide 05/09/2020 <5.0  0.0 - 100.0 pg/mL Final    POC D-DI 05/09/2020 199  0 - 450 ng/mL Final        Imaging Reviewed    Imaging Results          X-Ray Chest PA And Lateral (Final result)  Result time 05/09/20 21:46:06    Final result by Darek Jean Baptiste MD (05/09/20 21:46:06)                 Impression:      No acute cardiopulmonary process identified.      Electronically signed by: Darek Jean Baptiste MD  Date:    05/09/2020  Time:    21:46             Narrative:    EXAMINATION:  XR CHEST PA AND LATERAL    CLINICAL HISTORY:  Chest Pain;    TECHNIQUE:  PA and lateral views of the chest were performed.    COMPARISON:  04/24/2020.    FINDINGS:  Cardiac silhouette is normal in size.  Lungs are symmetrically expanded.  No evidence of focal consolidative process, pneumothorax, or significant effusion.  No acute osseous abnormality identified.                                Medications given in ED    Medications   aspirin tablet 325 mg (325 mg Oral Given 5/9/20 2116)       Scribe Attestation:   Scribe #1: I performed the above scribed service and the documentation accurately describes the  services I performed. I attest to the accuracy of the note.    This document was produced by a scribe under my direction and in my presence. I agree with the content of the note and have made any necessary edits.     Carrol Dillard MD         Note was created using voice recognition software. Note may have occasional typographical errors that may not have been identified and edited despite good miguel initial review prior to signing.      Clinical Impression:     1. Anxiety as acute reaction to exceptional stress    2. Chest pain    3. Gastroesophageal reflux disease without esophagitis                ED Disposition Condition    Discharge Stable        ED Prescriptions     Medication Sig Dispense Start Date End Date Auth. Provider    hydrOXYzine pamoate (VISTARIL) 50 MG Cap Take 1 capsule (50 mg total) by mouth nightly as needed (insomnia and anxiety). 15 capsule 5/9/2020  Carrol Dillard MD    fluticasone propionate (FLONASE) 50 mcg/actuation nasal spray 2 sprays (100 mcg total) by Each Nostril route once daily. 16 g 5/9/2020  Carrol Dillard MD    montelukast (SINGULAIR) 10 mg tablet Take 1 tablet (10 mg total) by mouth every evening. 30 tablet 5/9/2020 6/8/2020 Carrol Dillard MD    magnesium citrate solution (Expires today) Take 296 mLs by mouth once as needed (constipation). 295 mL 5/9/2020 5/9/2020 Carrol Dillard MD    famotidine (PEPCID) 20 MG tablet Take 1 tablet (20 mg total) by mouth 2 (two) times daily. for 7 days 14 tablet 5/9/2020 5/16/2020 Carrol Dillard MD    pantoprazole (PROTONIX) 20 MG tablet Take 1 tablet (20 mg total) by mouth once daily. 30 tablet 5/9/2020 5/9/2021 Carrol Dillard MD        Follow-up Information     Follow up With Specialties Details Why Contact Info    Nathan Angela  Call  Monday morning, to schedule an appointment, for re-evaluation of today's complaint, and ongoing care [address] [place] [address]  [phone]      [facility]  Internal Medicine, Family Medicine   [address] [facility] [address]  [phone]      [facility] Emergency Medicine Go to  As needed, If symptoms worsen [address] [facility] [id number] [phone]                                     Carrol Dillard MD  05/10/20 7911

## 2020-05-13 ENCOUNTER — HOSPITAL ENCOUNTER (EMERGENCY)
Facility: HOSPITAL | Age: 35
Discharge: HOME | End: 2020-05-13
Attending: EMERGENCY MEDICINE
Payer: MEDICAID

## 2020-05-13 VITALS
TEMPERATURE: 99 F | RESPIRATION RATE: 18 BRPM | OXYGEN SATURATION: 99 % | DIASTOLIC BLOOD PRESSURE: 78 MMHG | SYSTOLIC BLOOD PRESSURE: 130 MMHG | HEIGHT: 64 IN | WEIGHT: 216 LBS | HEART RATE: 64 BPM | BODY MASS INDEX: 36.88 KG/M2

## 2020-05-13 DIAGNOSIS — R79.89 ELEVATED SERUM CREATININE: Primary | ICD-10-CM

## 2020-05-13 DIAGNOSIS — E86.0 DEHYDRATION: ICD-10-CM

## 2020-05-13 DIAGNOSIS — Z87.11 HISTORY OF STOMACH ULCERS: ICD-10-CM

## 2020-05-13 DIAGNOSIS — R20.8 BURNING SENSATION OF SKIN: ICD-10-CM

## 2020-05-13 LAB
ALBUMIN SERPL-MCNC: 4.5 G/DL (ref 3.3–5.5)
ALP SERPL-CCNC: 58 U/L (ref 42–141)
BILIRUB SERPL-MCNC: 0.7 MG/DL (ref 0.2–1.6)
BUN SERPL-MCNC: 10 MG/DL (ref 7–22)
CALCIUM SERPL-MCNC: 9.8 MG/DL (ref 8–10.3)
CHLORIDE SERPL-SCNC: 106 MMOL/L (ref 98–108)
CREAT SERPL-MCNC: 1.4 MG/DL (ref 0.6–1.2)
GLUCOSE SERPL-MCNC: 93 MG/DL (ref 73–118)
POC ALT (SGPT): 22 U/L (ref 10–47)
POC AST (SGOT): 28 U/L (ref 11–38)
POC TCO2: 25 MMOL/L (ref 18–33)
POCT GLUCOSE: 73 MG/DL (ref 70–110)
POTASSIUM BLD-SCNC: 3.9 MMOL/L (ref 3.6–5.1)
PROTEIN, POC: 7.8 G/DL (ref 6.4–8.1)
SARS-COV-2 RDRP RESP QL NAA+PROBE: NEGATIVE
SODIUM BLD-SCNC: 143 MMOL/L (ref 128–145)

## 2020-05-13 PROCEDURE — 93010 EKG 12-LEAD: ICD-10-PCS | Mod: ,,, | Performed by: INTERNAL MEDICINE

## 2020-05-13 PROCEDURE — U0002 COVID-19 LAB TEST NON-CDC: HCPCS

## 2020-05-13 PROCEDURE — 93005 ELECTROCARDIOGRAM TRACING: CPT | Mod: ER

## 2020-05-13 PROCEDURE — 80053 COMPREHEN METABOLIC PANEL: CPT | Mod: ER

## 2020-05-13 PROCEDURE — 85025 COMPLETE CBC W/AUTO DIFF WBC: CPT | Mod: ER

## 2020-05-13 PROCEDURE — 99283 EMERGENCY DEPT VISIT LOW MDM: CPT | Mod: 25,ER

## 2020-05-13 PROCEDURE — 93010 ELECTROCARDIOGRAM REPORT: CPT | Mod: ,,, | Performed by: INTERNAL MEDICINE

## 2020-05-13 RX ORDER — PANTOPRAZOLE SODIUM 40 MG/1
40 TABLET, DELAYED RELEASE ORAL DAILY
Qty: 30 TABLET | Refills: 0 | OUTPATIENT
Start: 2020-05-13 | End: 2020-06-26

## 2020-05-13 NOTE — ED PROVIDER NOTES
Encounter Date: 5/13/2020    SCRIBE #1 NOTE: I, Yaya Jones, am scribing for, and in the presence of,  NIKOLE Clemente. I have scribed the following portions of the note - Other sections scribed: HPI, ROS, PE.       History     Chief Complaint   Patient presents with    burning pain in lower legs     Pt ot ER with c/o burning pain in bilateral lower legs x 1 day. Pt reports headache x 3 day    Chest Pain     x 1 month seen in ER x 2.     35 year old male with burning pain to bilateral lower legs onset yesterday. Pain feels more on the skin rather than bones or musculature. Patient noticed the burning sensation when he was cutting grass yesterday. So he stopped cutting, went inside to shower and states he felt better after drinking water, but reports the same pain appeared onto his arms. Patient also reports having a headache and visited the ED for chest pain over the last week. Denies any current chest pain, shortness of breath, rashes, extremity numbness/weakness, neck pain, or back pain.    The history is provided by the patient. No  was used.     Review of patient's allergies indicates:  No Known Allergies  Past Medical History:   Diagnosis Date    Gastric ulcer due to Helicobacter pylori     Gastric ulcer, acute     GERD (gastroesophageal reflux disease)     Hyperlipemia      Past Surgical History:   Procedure Laterality Date    HERNIA REPAIR       Family History   Problem Relation Age of Onset    Hypertension Mother     Diabetes Mellitus Mother     Kidney failure Mother     Hypertension Father     Diabetes Mellitus Father     Hypertension Sister      Social History     Tobacco Use    Smoking status: Former Smoker    Smokeless tobacco: Never Used   Substance Use Topics    Alcohol use: Yes     Frequency: 2-4 times a month     Drinks per session: 1 or 2     Comment: occasionally    Drug use: No     Review of Systems   Respiratory: Negative for shortness of breath.     Cardiovascular: Negative for chest pain and leg swelling.   Musculoskeletal: Negative for back pain and neck pain.   Skin: Negative for rash.        Positive for burning sensation.   Neurological: Positive for headaches. Negative for weakness and numbness.   All other systems reviewed and are negative.      Physical Exam     Initial Vitals [05/13/20 1342]   BP Pulse Resp Temp SpO2   (!) 133/107 63 18 98.7 °F (37.1 °C) 98 %      MAP       --         Physical Exam    Nursing note and vitals reviewed.  Constitutional: He appears well-developed and well-nourished.   HENT:   Head: Normocephalic and atraumatic.   Right Ear: External ear normal.   Left Ear: External ear normal.   Eyes: Conjunctivae are normal.   Neck: Normal range of motion. Neck supple.   Cardiovascular: Normal rate, regular rhythm and normal heart sounds. Exam reveals no gallop and no friction rub.    No murmur heard.  Pulmonary/Chest: Breath sounds normal. No respiratory distress. He has no wheezes. He has no rhonchi. He has no rales.   Musculoskeletal: Normal range of motion.   Neurological: He is alert and oriented to person, place, and time.   Skin: Skin is warm and dry.   Psychiatric: He has a normal mood and affect. His behavior is normal.         ED Course   Procedures  Labs Reviewed   POCT CMP - Abnormal; Notable for the following components:       Result Value    POC Creatinine 1.4 (*)     All other components within normal limits   SARS-COV-2 RNA AMPLIFICATION, QUAL   POCT CBC   POCT GLUCOSE MONITORING CONTINUOUS   POCT GLUCOSE   POCT CMP         EKG Readings: (Independently Interpreted)   Initial Reading: No STEMI. Rhythm: Normal Sinus Rhythm. Heart Rate: 64. Ectopy: No Ectopy. ST Segments: Normal ST Segments. T Waves: Normal.    good R-wave progression     ECG Results          EKG 12-lead (In process)  Result time 05/13/20 14:58:37    In process by Interface, Lab In [facility] (05/13/20 14:58:37)                 Narrative:    Test  Reason : R07.9,    Vent. Rate : 064 BPM     Atrial Rate : 064 BPM     P-R Int : 170 ms          QRS Dur : 094 ms      QT Int : 362 ms       P-R-T Axes : 039 038 048 degrees     QTc Int : 373 ms    Normal sinus rhythm  Normal ECG  When compared with ECG of 09-MAY-2020 21:05,  No significant change was found    Referred By: [email or web address]   SELF           Confirmed By:                             Imaging Results    None          Medical Decision Making:   History:   Old Medical Records: I decided to obtain old medical records.  Clinical Tests:   Lab Tests: Ordered and Reviewed  ED Management:  35-year-old male reports burning sensation to legs and arms today after cutting his grass.  Legs are currently asymptomatic, but he does still have symptoms on his arms.  There is no visible rash, and no tenderness to palpation.  Electrolytes within normal limits.  Creatinine minimally elevated at 1.4.  Suspect mild dehydration.  Patient is tolerating p.o. with no difficulty.  Will encourage p.o. hydration.  Unsure of etiology of burning sensation, but have low suspicion for serious process.  Patient reports history of stomach ulcers, and requesting medication.  Currently asymptomatic with no abdominal tenderness.  Will prescribe pantoprazole, and instruct patient to follow-up with PCP at [facility].  Patient comfortable with plan.            Scribe Attestation:   Scribe #1: I performed the above scribed service and the documentation accurately describes the services I performed. I attest to the accuracy of the note.     Doug Chan                      Clinical Impression:     1. Elevated serum creatinine    2. Dehydration    3. History of stomach ulcers    4. Burning sensation of skin                ED Disposition Condition    Discharge Stable        ED Prescriptions     Medication Sig Dispense Start Date End Date Auth. Provider    pantoprazole (PROTONIX) 40 MG tablet Take 1 tablet (40 mg total) by mouth once daily. 30  tablet 5/13/2020 5/13/2021 Doug Chan PA-C        Follow-up Information     Follow up With Specialties Details Why Contact Info    Primary care provider  Schedule an appointment as soon as possible for a visit       [facility]  Schedule an appointment as soon as possible for a visit  For re-evaluation of creatinine [address] [facility] [address]  [phone]                                       Doug Chan PA-C  05/13/20 1557       Doug Chan PA-C  05/13/20 1413

## 2020-05-13 NOTE — ED TRIAGE NOTES
Pt reports when he got home yesterday from cutting grass he started having burning pain to bilateral lower legs from the knee down, then he started having burning in bilateral arms and to epigastric area.  Reports he's been having HA x 7 days.  Reports he was seen here for CP earlier this week and that resolved but then started up again today.

## 2020-05-13 NOTE — PROVIDER PROGRESS NOTES - EMERGENCY DEPT.
Emergency Department TeleTRIAGE Encounter Note      CHIEF COMPLAINT    Chief Complaint   Patient presents with    burning pain in lower legs     Pt ot ER with c/o burning pain in bilateral lower legs x 1 day. Pt reports headache x 3 day    Chest Pain     x 1 month seen in ER x 2.       VITAL SIGNS   Initial Vitals [05/13/20 1342]   BP Pulse Resp Temp SpO2   (!) 133/107 63 18 98.7 °F (37.1 °C) 98 %      MAP       --            ALLERGIES    Review of patient's allergies indicates:  No Known Allergies    PROVIDER TRIAGE NOTE  Patient with past medical history gastric ulcer and GERD presents for evaluation of multiple complaints.  Patient reports headache and chest pain that is unchanged from his last ED visit last week.  Last night he reports noticing a burning sensation from his knees to his toes in bilateral lower extremities.  He denies numbness, tingling or weakness.  He denies trauma or injury.  He states the sensation had resolved this morning but has started again.  He also noticed it in bilateral arms as well earlier today.      ORDERS  Labs Reviewed - No data to display    ED Orders (720h ago, onward)    Start Ordered     Status Ordering Provider    05/13/20 1404 05/13/20 1403  POCT glucose  Once      Ordered AMAURY LANGSTON            Virtual Visit Note: The provider triage portion of this emergency department evaluation and documentation was performed via [employer], a HIPAA-compliant telemedicine application, in concert with a tele-presenter in the room. A face to face patient evaluation with one of my colleagues will occur once the patient is placed in an emergency department room.      DISCLAIMER: This note was prepared with [employer] voice recognition transcription software. Garbled syntax, mangled pronouns, and other bizarre constructions may be attributed to that software system.

## 2020-05-14 ENCOUNTER — TELEPHONE (OUTPATIENT)
Dept: EMERGENCY MEDICINE | Facility: HOSPITAL | Age: 35
End: 2020-05-14

## 2020-06-26 ENCOUNTER — HOSPITAL ENCOUNTER (EMERGENCY)
Facility: HOSPITAL | Age: 35
Discharge: HOME | End: 2020-06-26
Attending: EMERGENCY MEDICINE
Payer: MEDICAID

## 2020-06-26 VITALS
SYSTOLIC BLOOD PRESSURE: 132 MMHG | WEIGHT: 219 LBS | HEIGHT: 69 IN | TEMPERATURE: 98 F | BODY MASS INDEX: 32.44 KG/M2 | HEART RATE: 84 BPM | OXYGEN SATURATION: 98 % | RESPIRATION RATE: 18 BRPM | DIASTOLIC BLOOD PRESSURE: 77 MMHG

## 2020-06-26 DIAGNOSIS — R07.9 CHEST PAIN, UNSPECIFIED TYPE: Primary | ICD-10-CM

## 2020-06-26 DIAGNOSIS — R07.9 CHEST PAIN: ICD-10-CM

## 2020-06-26 DIAGNOSIS — R79.89 ELEVATED SERUM CREATININE: ICD-10-CM

## 2020-06-26 DIAGNOSIS — K21.9 GASTROESOPHAGEAL REFLUX DISEASE, ESOPHAGITIS PRESENCE NOT SPECIFIED: ICD-10-CM

## 2020-06-26 LAB
ALBUMIN SERPL-MCNC: 4.2 G/DL (ref 3.3–5.5)
ALP SERPL-CCNC: 61 U/L (ref 42–141)
BILIRUB SERPL-MCNC: 0.8 MG/DL (ref 0.2–1.6)
BUN SERPL-MCNC: 10 MG/DL (ref 7–22)
CALCIUM SERPL-MCNC: 10.8 MG/DL (ref 8–10.3)
CHLORIDE SERPL-SCNC: 106 MMOL/L (ref 98–108)
CREAT SERPL-MCNC: 1.4 MG/DL (ref 0.6–1.2)
CTP QC/QA: YES
GLUCOSE SERPL-MCNC: 94 MG/DL (ref 73–118)
POC ALT (SGPT): 23 U/L (ref 10–47)
POC AST (SGOT): 29 U/L (ref 11–38)
POC CARDIAC TROPONIN I: 0 NG/ML
POC PTINR: 0.9 (ref 0.9–1.2)
POC PTWBT: 11.4 SEC (ref 9.7–14.3)
POC TCO2: 30 MMOL/L (ref 18–33)
POTASSIUM BLD-SCNC: 4.3 MMOL/L (ref 3.6–5.1)
PROTEIN, POC: 8 G/DL (ref 6.4–8.1)
SAMPLE: NORMAL
SAMPLE: NORMAL
SARS-COV-2 RDRP RESP QL NAA+PROBE: NEGATIVE
SODIUM BLD-SCNC: 143 MMOL/L (ref 128–145)

## 2020-06-26 PROCEDURE — U0002 COVID-19 LAB TEST NON-CDC: HCPCS | Mod: ER | Performed by: NURSE PRACTITIONER

## 2020-06-26 PROCEDURE — 84484 ASSAY OF TROPONIN QUANT: CPT | Mod: ER

## 2020-06-26 PROCEDURE — 80053 COMPREHEN METABOLIC PANEL: CPT | Mod: ER

## 2020-06-26 PROCEDURE — 25000003 PHARM REV CODE 250: Mod: ER | Performed by: NURSE PRACTITIONER

## 2020-06-26 PROCEDURE — 85025 COMPLETE CBC W/AUTO DIFF WBC: CPT | Mod: ER

## 2020-06-26 PROCEDURE — 85610 PROTHROMBIN TIME: CPT | Mod: ER

## 2020-06-26 PROCEDURE — 93010 ELECTROCARDIOGRAM REPORT: CPT | Mod: ,,, | Performed by: INTERNAL MEDICINE

## 2020-06-26 PROCEDURE — 93005 ELECTROCARDIOGRAM TRACING: CPT | Mod: ER

## 2020-06-26 PROCEDURE — 93010 EKG 12-LEAD: ICD-10-PCS | Mod: ,,, | Performed by: INTERNAL MEDICINE

## 2020-06-26 PROCEDURE — 99285 EMERGENCY DEPT VISIT HI MDM: CPT | Mod: 25,ER

## 2020-06-26 RX ORDER — DEXTROMETHORPHAN HYDROBROMIDE, GUAIFENESIN 5; 100 MG/5ML; MG/5ML
650 LIQUID ORAL EVERY 8 HOURS
Qty: 20 TABLET | Refills: 0 | OUTPATIENT
Start: 2020-06-26 | End: 2022-11-25

## 2020-06-26 RX ORDER — METHOCARBAMOL 500 MG/1
1000 TABLET, FILM COATED ORAL 3 TIMES DAILY
Qty: 30 TABLET | Refills: 0 | Status: SHIPPED | OUTPATIENT
Start: 2020-06-26 | End: 2020-07-01

## 2020-06-26 RX ORDER — LIDOCAINE HYDROCHLORIDE 20 MG/ML
10 SOLUTION OROPHARYNGEAL
Status: COMPLETED | OUTPATIENT
Start: 2020-06-26 | End: 2020-06-26

## 2020-06-26 RX ORDER — MAG HYDROX/ALUMINUM HYD/SIMETH 200-200-20
30 SUSPENSION, ORAL (FINAL DOSE FORM) ORAL
Status: COMPLETED | OUTPATIENT
Start: 2020-06-26 | End: 2020-06-26

## 2020-06-26 RX ORDER — FAMOTIDINE 20 MG/1
20 TABLET, FILM COATED ORAL DAILY
Qty: 20 TABLET | Refills: 0 | OUTPATIENT
Start: 2020-06-26 | End: 2021-07-17

## 2020-06-26 RX ORDER — ASPIRIN 325 MG
325 TABLET ORAL
Status: COMPLETED | OUTPATIENT
Start: 2020-06-26 | End: 2020-06-26

## 2020-06-26 RX ADMIN — ALUMINUM HYDROXIDE, MAGNESIUM HYDROXIDE, AND SIMETHICONE 30 ML: 200; 200; 20 SUSPENSION ORAL at 07:06

## 2020-06-26 RX ADMIN — ASPIRIN 325 MG ORAL TABLET 325 MG: 325 PILL ORAL at 07:06

## 2020-06-26 RX ADMIN — LIDOCAINE HYDROCHLORIDE 10 ML: 20 SOLUTION ORAL; TOPICAL at 07:06

## 2020-06-26 NOTE — Clinical Note
Cristo Ruff was seen and treated in our emergency department on 6/26/2020.  He may return to work on 06/27/2020.  COVID NEGATIVE     If you have any questions or concerns, please don't hesitate to call.       RN

## 2020-06-27 NOTE — ED PROVIDER NOTES
Encounter Date: 6/26/2020    SCRIBE #1 NOTE: I, Shikha Perry, am scribing for, and in the presence of,  MARYELLEN Boykin. I have scribed the following portions of the note - Other sections scribed: HPI, ROS, PE.       History     Chief Complaint   Patient presents with    Chest Pain     L sided cp x 2 days; non-radiating with cough producine white phlegm     Cristo Ruff is a 35 y.o. male with history of GERD, HLD, and gastric ulcers who presents to the ED complaining of left-sided chest pain associated with productive cough with white phlegm. Patient states symptoms are worse since eating fried and greasy foods.  Also reports productive cough with post nasal drip.  Patient denies fatigue, fever, rash, SOB, numbness, weakness, tingling, abdominal pain, back pain, dysuria, hematuria, nausea, vomiting, diarrhea, or any other complaints. No alleviating factors.  He rates his pain, describes his pain as aching, and has not tried any medications for the symptoms.    The history is provided by the patient. No  was used.     Review of patient's allergies indicates:  No Known Allergies  Past Medical History:   Diagnosis Date    Gastric ulcer due to Helicobacter pylori     Gastric ulcer, acute     GERD (gastroesophageal reflux disease)     Hyperlipemia      Past Surgical History:   Procedure Laterality Date    HERNIA REPAIR       Family History   Problem Relation Age of Onset    Hypertension Mother     Diabetes Mellitus Mother     Kidney failure Mother     Hypertension Father     Diabetes Mellitus Father     Hypertension Sister      Social History     Tobacco Use    Smoking status: Former Smoker    Smokeless tobacco: Never Used   Substance Use Topics    Alcohol use: Yes     Frequency: 2-4 times a month     Drinks per session: 1 or 2     Comment: occasionally    Drug use: No     Review of Systems   Constitutional: Negative for chills, fatigue and fever.   HENT: Positive for postnasal  drip. Negative for congestion, ear pain, rhinorrhea and sore throat.    Eyes: Negative for pain, discharge and redness.   Respiratory: Positive for cough. Negative for shortness of breath.    Cardiovascular: Positive for chest pain (left-sided).   Gastrointestinal: Negative for abdominal pain, diarrhea, nausea and vomiting.   Genitourinary: Negative for dysuria and hematuria.   Musculoskeletal: Negative for back pain, neck pain and neck stiffness.   Skin: Negative for rash.   Neurological: Negative for dizziness, weakness, light-headedness, numbness and headaches.   Psychiatric/Behavioral: Negative for confusion.       Physical Exam     Initial Vitals   BP Pulse Resp Temp SpO2   06/26/20 1909 06/26/20 1908 06/26/20 1908 06/26/20 1908 06/26/20 1908   121/78 63 16 97.8 °F (36.6 °C) 98 %      MAP       --                Physical Exam    Nursing note and vitals reviewed.  Constitutional: Vital signs are normal. He appears well-developed. He is cooperative.  Non-toxic appearance. He does not appear ill.   HENT:   Head: Normocephalic and atraumatic.   Right Ear: Tympanic membrane normal.   Left Ear: Tympanic membrane normal.   Nose: Mucosal edema present.   Mouth/Throat: Uvula is midline, oropharynx is clear and moist and mucous membranes are normal.   Post nasal drip   Eyes: Conjunctivae are normal.   Neck: Normal range of motion.   Cardiovascular: Normal rate, regular rhythm, S1 normal, S2 normal, normal heart sounds and intact distal pulses. Exam reveals no gallop and no friction rub.    No murmur heard.  Pulmonary/Chest: Effort normal and breath sounds normal. No respiratory distress. He has no decreased breath sounds. He has no wheezes. He has no rhonchi. He has no rales. He exhibits no tenderness, no crepitus and no deformity.   No chest wall tenderness. No erythema, bruising, or rash.   Abdominal: Soft. Normal appearance.   Musculoskeletal: Normal range of motion.      Right lower leg: Normal. He exhibits no  tenderness and no swelling. No edema.      Left lower leg: Normal. He exhibits no tenderness and no swelling. No edema.      Comments: No calf tenderness or swelling.   Neurological: He is alert and oriented to person, place, and time. GCS eye subscore is 4. GCS verbal subscore is 5. GCS motor subscore is 6.   Skin: Skin is warm, dry and intact. No rash noted.   Psychiatric: He has a normal mood and affect. His speech is normal and behavior is normal. Thought content normal.         ED Course   Procedures  Labs Reviewed   POCT CBC   SARS-COV-2 RDRP GENE   POCT CMP   POCT PROTIME-INR   POCT TROPONIN     EKG Readings: (Independently Interpreted)   Initial Reading: No STEMI. Previous EKG Date: 5/13/2020. Rhythm: Normal Sinus Rhythm. Heart Rate: 61.   QTc is 362. When compared to previous EGK, rate has decreased by 3 BPM. No significant changes from previous EKG.       Imaging Results    None          Medical Decision Making:   History:   Old Medical Records: I decided to obtain old medical records.  Independently Interpreted Test(s):   I have ordered and independently interpreted EKG Reading(s) - see prior notes  Clinical Tests:   Lab Tests: Ordered and Reviewed  Radiological Study: Ordered and Reviewed  Medical Tests: Ordered and Reviewed    Additional MDM:   PERC Rule:   Age is greater than or equal to 50 = 0.0  Heart Rate is greater than or equal to 100 = 0.0  SaO2 on room air < 95% = 0.0  Unilateral leg swelling = 0.0  Hemoptysis = 0.0  Recent surgery or trauma = 0.0  Prior PE or DVT =  0.0  Hormone use = 0.00  PERC Score = 0    Well's Criteria Score:  -Clinical symptoms of DVT (leg swelling, pain with palpation) = 0.0  -Other diagnosis less likely than pulmonary embolism =            0.0  -Heart Rate >100 =   0.0  -Immobilization (= or > than 3 days) or surgery in the previous 4 weeks = 0.0  -Previous DVT/PE = 0.0  -Hemoptysis =          0.0  -Malignancy =           0.0  Well's Probability Score =    0      Heart  Score:    History:          Slightly suspicious.  ECG:             Normal  Age:               Less than 45 years  Risk factors: no risk factors known  Troponin:       Less than or equal to normal limit  Final Score: 0        APC / Resident Notes:   This is an evaluation of a 35 y.o. male that presents to the Emergency Department for left-sided chest pain for 3 days. Physical Exam shows a non-toxic, afebrile, and well appearing male. Mucosal edema and post nasal drip with remainder of exam normal    Vital Signs Are Reassuring. If available, previous records reviewed. Symptoms improved after treatment    RESULTS: COVID negative, labs stable.  CXR negative for infiltrates, pneumothorax, cardiomegaly, pleural effusion or widening of the mediastinum. Patient is PERC Negative. No evidence of hypoxia.  EKG interpreted by myself and , with No Stemi.     My overall impression is chest pain, GERD, elevated creatinine.  I considered but doubt pulmonary embolus, acute myocardial infarction, Boerhaeve syndrome, cardiac tamponade, thoracic artery dissection, acute coronary syndrome, pneumothorax, pneumonia, CHF, cardiac arrhythmia, or any other emergent cardiac, pulmonary or aortic pathology.     ED Course: CXR, EKG, labs, COVID, GI cocktail, ASA. D/C Meds: Tylenol, Robaxin, Pepcid. D/C Information: f/u, medications. The diagnosis, treatment plan, instructions for follow-up and reevaluation with Cardiology, Gastroenterology as well as ED return precautions were discussed and understanding was verbalized. All questions or concerns have been addressed. This case was discussed with Dr. Palmer who is in agreement with my assessment and plan.          Scribe Attestation:   Scribe #1: I performed the above scribed service and the documentation accurately describes the services I performed. I attest to the accuracy of the note.    Scribe attestation: I, ROSIBEL Boykin, personally performed the services described in this  documentation.  All medical record entries made by the scribe were at my direction and in my presence.  I have reviewed the chart and agree that the record reflects my personal performance and is accurate and complete.                        Clinical Impression:     1. Chest pain, unspecified type    2. Chest pain    3. Elevated serum creatinine    4. Gastroesophageal reflux disease, esophagitis presence not specified        Disposition:   Disposition: Discharged  Condition: Stable                        Lawanda Black, MARYELLEN  06/26/20 2039

## 2020-07-07 NOTE — ED PROVIDER NOTES
Encounter Date: 6/26/2020       History     Chief Complaint   Patient presents with    Chest Pain     L sided cp x 2 days; non-radiating with cough producine white phlegm     HPI  Review of patient's allergies indicates:  No Known Allergies  Past Medical History:   Diagnosis Date    Gastric ulcer due to Helicobacter pylori     Gastric ulcer, acute     GERD (gastroesophageal reflux disease)     Hyperlipemia      Past Surgical History:   Procedure Laterality Date    HERNIA REPAIR       Family History   Problem Relation Age of Onset    Hypertension Mother     Diabetes Mellitus Mother     Kidney failure Mother     Hypertension Father     Diabetes Mellitus Father     Hypertension Sister      Social History     Tobacco Use    Smoking status: Former Smoker    Smokeless tobacco: Never Used   Substance Use Topics    Alcohol use: Yes     Frequency: 2-4 times a month     Drinks per session: 1 or 2     Comment: occasionally    Drug use: No     Review of Systems    Physical Exam     Initial Vitals   BP Pulse Resp Temp SpO2   06/26/20 1909 06/26/20 1908 06/26/20 1908 06/26/20 1908 06/26/20 1908   121/78 63 16 97.8 °F (36.6 °C) 98 %      MAP       --                Physical Exam    ED Course   Procedures  Labs Reviewed   POCT CMP - Abnormal; Notable for the following components:       Result Value    Calcium, POC 10.8 (*)     POC Creatinine 1.4 (*)     All other components within normal limits   TROPONIN ISTAT   POCT CBC   SARS-COV-2 RDRP GENE   POCT CMP   POCT PROTIME-INR   POCT TROPONIN   ISTAT PROCEDURE        ECG Results          EKG 12-lead (Final result)  Result time 06/27/20 21:12:17    Final result by Interface, Lab In [facility] (06/27/20 21:12:17)                 Narrative:    Test Reason : R07.9,    Vent. Rate : 061 BPM     Atrial Rate : 061 BPM     P-R Int : 168 ms          QRS Dur : 090 ms      QT Int : 360 ms       P-R-T Axes : 034 027 050 degrees     QTc Int : 362 ms    Normal sinus rhythm  Normal  ECG  When compared with ECG of 13-MAY-2020 14:40,  No significant change was found  Confirmed by Davdi CARDENAS, Sandra MATSON ([id number]) on 2020 9:12:06 PM    Referred By: LAURYN   SELF           Confirmed By:Sandra Hernandez MD                            Imaging Results          X-Ray Chest PA And Lateral (Final result)  Result time 20 20:10:01    Final result by Chavez Fish MD (20 20:10:01)                 Impression:      1. No acute cardiopulmonary process, hypoventilatory exam peer      Electronically signed by: Chavez Fish MD  Date:    2020  Time:    20:10             Narrative:    EXAMINATION:  XR CHEST PA AND LATERAL    CLINICAL HISTORY:  Chest Pain;    TECHNIQUE:  PA and lateral views of the chest were performed.    COMPARISON:  2020    FINDINGS:  The cardiomediastinal silhouette is not enlarged.  There is no pleural effusion.  The trachea is midline.  The lungs are symmetrically expanded bilaterally without evidence of acute parenchymal process. No large focal consolidation seen.  There is minimal left basilar subsegmental atelectasis.  There is no pneumothorax.  The osseous structures are unremarkable.                                                                 Clinical Impression:       ICD-10-CM ICD-9-CM   1. Chest pain, unspecified type  R07.9 786.50   2. Chest pain  R07.9 786.50   3. Elevated serum creatinine  R79.89 790.99   4. Gastroesophageal reflux disease, esophagitis presence not specified  K21.9 530.81             ED Disposition Condition    Discharge Stable        ED Prescriptions     Medication Sig Dispense Start Date End Date Auth. Provider    acetaminophen (TYLENOL) 650 MG TbSR Take 1 tablet (650 mg total) by mouth every 8 (eight) hours. 20 tablet 2020  MARYELLEN Sawant    famotidine (PEPCID) 20 MG tablet Take 1 tablet (20 mg total) by mouth once daily. 20 tablet 2020 MARYELLEN Sawant    methocarbamoL (ROBAXIN) 500 MG Tab () Take 2  tablets (1,000 mg total) by mouth 3 (three) times daily. for 5 days 30 tablet 6/26/2020 7/1/2020 MARYELLEN Sawant        Follow-up Information     Follow up With Specialties Details Why Contact Info    Jarrett Celis MD Family Medicine Schedule an appointment as soon as possible for a visit in 3 days  [address]  [place] [address]  [phone]      [facility] Emergency Department Emergency Medicine Go to  If symptoms worsen [address] [facility] [address] [phone]    Sandra Hernandez MD Cardiology, INTERVENTIONAL CARDIOLOGY Schedule an appointment as soon as possible for a visit in 3 days  [address]  [address]  [facility] [address]  [phone]      Jarrett Celis MD Family Medicine Schedule an appointment as soon as possible for a visit in 3 days  [address]  [place] [address]  [phone]      [facility] GASTROENTEROLOGY Gastroenterology Schedule an appointment as soon as possible for a visit in 3 days  [address] [facility] [address]  [phone]                                     Jason Palmer MD  07/06/20 4566

## 2020-07-16 ENCOUNTER — OFFICE VISIT (OUTPATIENT)
Dept: CARDIOLOGY | Facility: CLINIC | Age: 35
End: 2020-07-16
Payer: MEDICAID

## 2020-07-16 VITALS
RESPIRATION RATE: 15 BRPM | SYSTOLIC BLOOD PRESSURE: 126 MMHG | BODY MASS INDEX: 32.58 KG/M2 | DIASTOLIC BLOOD PRESSURE: 78 MMHG | WEIGHT: 220 LBS | HEART RATE: 68 BPM | HEIGHT: 69 IN | OXYGEN SATURATION: 98 %

## 2020-07-16 DIAGNOSIS — R07.89 CHEST PAIN, NON-CARDIAC: Primary | ICD-10-CM

## 2020-07-16 DIAGNOSIS — E66.9 NON MORBID OBESITY, UNSPECIFIED OBESITY TYPE: ICD-10-CM

## 2020-07-16 DIAGNOSIS — E66.09 CLASS 1 OBESITY DUE TO EXCESS CALORIES WITHOUT SERIOUS COMORBIDITY WITH BODY MASS INDEX (BMI) OF 34.0 TO 34.9 IN ADULT: ICD-10-CM

## 2020-07-16 DIAGNOSIS — E78.2 MIXED HYPERLIPIDEMIA: ICD-10-CM

## 2020-07-16 DIAGNOSIS — R06.09 DOE (DYSPNEA ON EXERTION): ICD-10-CM

## 2020-07-16 DIAGNOSIS — Z01.810 PREOP CARDIOVASCULAR EXAM: ICD-10-CM

## 2020-07-16 PROCEDURE — 99999 PR PBB SHADOW E&M-EST. PATIENT-LVL III: ICD-10-PCS | Mod: PBBFAC,,, | Performed by: INTERNAL MEDICINE

## 2020-07-16 PROCEDURE — 99999 PR PBB SHADOW E&M-EST. PATIENT-LVL III: CPT | Mod: PBBFAC,,, | Performed by: INTERNAL MEDICINE

## 2020-07-16 PROCEDURE — 99213 OFFICE O/P EST LOW 20 MIN: CPT | Mod: PBBFAC | Performed by: INTERNAL MEDICINE

## 2020-07-16 PROCEDURE — 99204 OFFICE O/P NEW MOD 45 MIN: CPT | Mod: S$PBB,,, | Performed by: INTERNAL MEDICINE

## 2020-07-16 PROCEDURE — 99204 PR OFFICE/OUTPT VISIT, NEW, LEVL IV, 45-59 MIN: ICD-10-PCS | Mod: S$PBB,,, | Performed by: INTERNAL MEDICINE

## 2020-07-16 NOTE — PROGRESS NOTES
CARDIOVASCULAR CONSULTATION    REASON FOR CONSULT:   Cristo Ruff is a 35 y.o. male who presents for CP, preop CV eval.    PCP: Lalita Woodard: Estela (ER)  Surg: [place]  HISTORY OF PRESENT ILLNESS:   The patient is a pleasant 35-year-old man presents for preoperative cardiac evaluation as well as evaluation of chest pain.  He has had several ER visits for chest pain.  He denies any prior cardiac stress testing.  He describes left-sided chest discomfort, predominantly at rest, particularly after meals and when he is in a recumbent position.  He tells me he has taken Prilosec with significant improvement in his symptoms.  He also describes some dyspnea on exertion, but nothing out of the ordinary.  He tells me that he can mow several lawns without any symptoms.  He otherwise denies palpitations, lightheadedness, dizziness, or syncope.  There has been no PND, orthopnea, or lower extremity edema.  He denies melena, hematuria, or claudicant symptoms.  Patient tells me he is due to undergo hernia repair by Dr. Lowe in the near future.    I took the liberty of exercise in the patient in the office today, and he was able to jog up a flight of stairs without any symptoms or limitations.    Family history is notable for absence of premature CAD in first-degree relatives.    The patient denies tobacco use, alcohol excess, or illicit drug use.  He works in [employer] and [employer].    CARDIOVASCULAR HISTORY:   none    PAST MEDICAL HISTORY:     Past Medical History:   Diagnosis Date    Gastric ulcer due to Helicobacter pylori     Gastric ulcer, acute     GERD (gastroesophageal reflux disease)     Hyperlipemia        PAST SURGICAL HISTORY:     Past Surgical History:   Procedure Laterality Date    HERNIA REPAIR         ALLERGIES AND MEDICATION:   Review of patient's allergies indicates:  No Known Allergies     Medication List          Accurate as of July 16, 2020 10:15 AM. If you have any  questions, ask your nurse or doctor.            CONTINUE taking these medications    acetaminophen 650 MG Tbsr  Commonly known as: TYLENOL  Take 1 tablet (650 mg total) by mouth every 8 (eight) hours.     famotidine 20 MG tablet  Commonly known as: PEPCID  Take 1 tablet (20 mg total) by mouth once daily.            SOCIAL HISTORY:     Social History     Socioeconomic History    Marital status: Single     Spouse name: Not on file    Number of children: Not on file    Years of education: Not on file    Highest education level: Not on file   Occupational History    Occupation:     Occupation: Automotive detailing   Social Needs    Financial resource strain: Not on file    Food insecurity     Worry: Not on file     Inability: Not on file    Transportation needs     Medical: Not on file     Non-medical: Not on file   Tobacco Use    Smoking status: Former Smoker    Smokeless tobacco: Never Used   Substance and Sexual Activity    Alcohol use: Yes     Frequency: 2-4 times a month     Drinks per session: 1 or 2     Comment: occasionally    Drug use: No    Sexual activity: Yes     Partners: Female     Birth control/protection: None   Lifestyle    Physical activity     Days per week: Not on file     Minutes per session: Not on file    Stress: Not on file   Relationships    Social connections     Talks on phone: Not on file     Gets together: Not on file     Attends Yazidism service: Not on file     Active member of club or organization: Not on file     Attends meetings of clubs or organizations: Not on file     Relationship status: Not on file   Other Topics Concern    Not on file   Social History Narrative    Not on file       FAMILY HISTORY:     Family History   Problem Relation Age of Onset    Hypertension Mother     Diabetes Mellitus Mother     Kidney failure Mother     Hypertension Father     Diabetes Mellitus Father     Hypertension Sister        REVIEW OF SYSTEMS:   Review of  "Systems   Constitutional: Negative for chills, diaphoresis and fever.   HENT: Negative for nosebleeds.    Eyes: Negative for blurred vision, double vision and photophobia.   Respiratory: Positive for shortness of breath. Negative for hemoptysis and wheezing.    Cardiovascular: Positive for chest pain. Negative for palpitations, orthopnea, claudication, leg swelling and PND.   Gastrointestinal: Positive for heartburn. Negative for abdominal pain, blood in stool, melena, nausea and vomiting.   Genitourinary: Negative for flank pain and hematuria.   Musculoskeletal: Negative for falls, myalgias and neck pain.   Skin: Negative for rash.   Neurological: Negative for dizziness, seizures, loss of consciousness, weakness and headaches.   Endo/Heme/Allergies: Negative for polydipsia. Does not bruise/bleed easily.   Psychiatric/Behavioral: Negative for depression and memory loss. The patient is not nervous/anxious.        PHYSICAL EXAM:     Vitals:    07/16/20 0940   BP: 126/78   Pulse: 68   Resp: 15    Body mass index is 32.49 kg/m².  Weight: 99.8 kg (220 lb 0.3 oz)   Height: 5' 9" (175.3 cm)     Physical Exam  Vitals signs reviewed.   Constitutional:       General: He is not in acute distress.     Appearance: He is well-developed. He is obese. He is not ill-appearing, toxic-appearing or diaphoretic.   HENT:      Head: Normocephalic and atraumatic.   Eyes:      General: No scleral icterus.     Conjunctiva/sclera: Conjunctivae normal.      Pupils: Pupils are equal, round, and reactive to light.   Neck:      Musculoskeletal: Neck supple. No edema or neck rigidity.      Thyroid: No thyromegaly.      Vascular: Normal carotid pulses. No carotid bruit or JVD.      Trachea: Trachea normal.   Cardiovascular:      Rate and Rhythm: Normal rate and regular rhythm.      Pulses:           Carotid pulses are 2+ on the right side and 2+ on the left side.     Heart sounds: S1 normal and S2 normal. No murmur. No friction rub. No gallop.  "   Pulmonary:      Effort: Pulmonary effort is normal. No respiratory distress.      Breath sounds: Normal breath sounds. No stridor. No wheezing, rhonchi or rales.   Chest:      Chest wall: No tenderness.   Abdominal:      General: There is no distension.      Palpations: Abdomen is soft.   Musculoskeletal: Normal range of motion.         General: No swelling or tenderness.   Feet:      Right foot:      Skin integrity: No ulcer.      Left foot:      Skin integrity: No ulcer.   Skin:     General: Skin is warm and dry.      Findings: No erythema or rash.   Neurological:      Mental Status: He is alert and oriented to person, place, and time.      Cranial Nerves: No cranial nerve deficit.   Psychiatric:         Speech: Speech normal.         Behavior: Behavior normal. Behavior is cooperative.         DATA:   EKG: (personally reviewed tracing)  6/26/20 SR 61    Laboratory:  CBC:  Recent Labs   Lab 02/09/19  0841   WBC 8.52   Hemoglobin 14.1   Hematocrit 46.9   Platelets 227       CHEMISTRIES:  Recent Labs   Lab 02/09/19  0841   Glucose 104   Sodium 139   Potassium 4.6   BUN, Bld 15   Creatinine 1.1   eGFR if African American >60.0   eGFR if non African American >60.0   Calcium 10.3       CARDIAC BIOMARKERS:        COAGS:        LIPIDS/LFTS:  Recent Labs   Lab 02/09/19  0841   Cholesterol 231 H   Triglycerides 82   HDL 47   LDL Cholesterol 167.6 H   Non-HDL Cholesterol 184   AST 17   ALT 20         Cardiovascular Testing:  Ordered  Ex stress echo    ASSESSMENT:   # Preop for hernia repair, pt demonstrated good exercise capacity in the office today.  # CP, atyp, ?GI  # HLP  # BMI 32    PLAN:   The patient is at low cardiac risk for the planned surgical procedure and anesthesia as deemed necessary.  No preoperative cardiac testing is required.  Stress testing, as suggested above, does not need to delay his surgery.  Ex stress echo, after he recovers from his hernia repair.  Prilosec OTC x 2 weeks, consider GI  eval.  Diet/exercise/weight loss.  Repeat lipid profile in 6 months (Jan 2021) after efforts toward lifestyle modification undertaken.  RTC 1 month, virtual visit.    Jason Cedillo MD, FACC

## 2020-07-20 ENCOUNTER — NURSE TRIAGE (OUTPATIENT)
Dept: ADMINISTRATIVE | Facility: CLINIC | Age: 35
End: 2020-07-20

## 2020-07-20 NOTE — TELEPHONE ENCOUNTER
Had a hernia repair at [facility]. Has not been able to contact surgeon. Has 3 incisions. Above incision having fluttering at site. Site is above naval under rib cage on right hand side. Denies pain just fluttering. Pt unable to contact his surgeon. Advised to contact surgeon or go to ED now.    Reason for Disposition   Nursing judgment    Protocols used: NO GUIDELINE OR REFERENCE [id number]

## 2020-08-10 ENCOUNTER — HOSPITAL ENCOUNTER (OUTPATIENT)
Dept: CARDIOLOGY | Facility: HOSPITAL | Age: 35
Discharge: HOME | End: 2020-08-10
Attending: INTERNAL MEDICINE
Payer: MEDICAID

## 2020-08-10 DIAGNOSIS — R06.09 DOE (DYSPNEA ON EXERTION): ICD-10-CM

## 2020-08-10 DIAGNOSIS — R07.89 CHEST PAIN, NON-CARDIAC: ICD-10-CM

## 2020-08-10 LAB
AORTIC ROOT ANNULUS: 2.8 CM
AORTIC VALVE CUSP SEPERATION: 2.73 CM
ASCENDING AORTA: 2.77 CM
AV INDEX (PROSTH): 0.89
AV MEAN GRADIENT: 6 MMHG
AV PEAK GRADIENT: 13 MMHG
AV VALVE AREA: 3.34 CM2
AV VELOCITY RATIO: 0.83
CV ECHO LV RWT: 0.4 CM
CV STRESS BASE HR: 65 BPM
DIASTOLIC BLOOD PRESSURE: 77 MMHG
DOP CALC AO PEAK VEL: 1.79 M/S
DOP CALC AO VTI: 28.58 CM
DOP CALC LVOT AREA: 3.8 CM2
DOP CALC LVOT DIAMETER: 2.19 CM
DOP CALC LVOT PEAK VEL: 1.48 M/S
DOP CALC LVOT STROKE VOLUME: 95.33 CM3
DOP CALCLVOT PEAK VEL VTI: 25.32 CM
E WAVE DECELERATION TIME: 240.82 MSEC
E/A RATIO: 1.37
ECHO LV POSTERIOR WALL: 0.98 CM (ref 0.6–1.1)
FRACTIONAL SHORTENING: 40 % (ref 28–44)
INTERVENTRICULAR SEPTUM: 0.99 CM (ref 0.6–1.1)
IVRT: 92.27 MSEC
LA MAJOR: 5.42 CM
LA MINOR: 5.72 CM
LA WIDTH: 3.37 CM
LEFT ATRIUM SIZE: 4 CM
LEFT ATRIUM VOLUME: 63.77 CM3
LEFT INTERNAL DIMENSION IN SYSTOLE: 2.94 CM (ref 2.1–4)
LEFT VENTRICLE DIASTOLIC VOLUME: 113.15 ML
LEFT VENTRICLE SYSTOLIC VOLUME: 33.25 ML
LEFT VENTRICULAR INTERNAL DIMENSION IN DIASTOLE: 4.91 CM (ref 3.5–6)
LEFT VENTRICULAR MASS: 173.07 G
MV PEAK A VEL: 0.59 M/S
MV PEAK E VEL: 0.81 M/S
MV STENOSIS PRESSURE HALF TIME: 69.84 MS
MV VALVE AREA P 1/2 METHOD: 3.15 CM2
OHS CV CPX 1 MINUTE RECOVERY HEART RATE: 111 BPM
OHS CV CPX 85 PERCENT MAX PREDICTED HEART RATE MALE: 157
OHS CV CPX ESTIMATED METS: 10
OHS CV CPX MAX PREDICTED HEART RATE: 185
OHS CV CPX PATIENT IS FEMALE: 0
OHS CV CPX PATIENT IS MALE: 1
OHS CV CPX PEAK DIASTOLIC BLOOD PRESSURE: 49 MMHG
OHS CV CPX PEAK HEAR RATE: 157 BPM
OHS CV CPX PEAK RATE PRESSURE PRODUCT: NORMAL
OHS CV CPX PEAK SYSTOLIC BLOOD PRESSURE: 208 MMHG
OHS CV CPX PERCENT MAX PREDICTED HEART RATE ACHIEVED: 85
OHS CV CPX RATE PRESSURE PRODUCT PRESENTING: 8125
PISA TR MAX VEL: 2.59 M/S
PULM VEIN S/D RATIO: 0.76
PV PEAK D VEL: 0.55 M/S
PV PEAK S VEL: 0.42 M/S
PV PEAK VELOCITY: 1.4 CM/S
RA MAJOR: 5.05 CM
RA PRESSURE: 3 MMHG
RA WIDTH: 3.48 CM
RIGHT VENTRICULAR END-DIASTOLIC DIMENSION: 3.77 CM
RV TISSUE DOPPLER FREE WALL SYSTOLIC VELOCITY 1 (APICAL 4 CHAMBER VIEW): 18.19 CM/S
SINUS: 3.15 CM
STJ: 2.39 CM
STRESS ANGINA INDEX: 0
STRESS ECHO POST EXERCISE DUR MIN: 9 MINUTES
STRESS ECHO POST EXERCISE DUR SEC: 3 SECONDS
SYSTOLIC BLOOD PRESSURE: 125 MMHG
TR MAX PG: 27 MMHG
TRICUSPID ANNULAR PLANE SYSTOLIC EXCURSION: 2.69 CM
TV REST PULMONARY ARTERY PRESSURE: 30 MMHG

## 2020-08-10 PROCEDURE — 93325 STRESS ECHO (CUPID ONLY): ICD-10-PCS | Mod: 26,,, | Performed by: INTERNAL MEDICINE

## 2020-08-10 PROCEDURE — 93351 STRESS TTE COMPLETE: CPT | Mod: 26,,, | Performed by: INTERNAL MEDICINE

## 2020-08-10 PROCEDURE — 93320 STRESS ECHO (CUPID ONLY): ICD-10-PCS | Mod: 26,,, | Performed by: INTERNAL MEDICINE

## 2020-08-10 PROCEDURE — 93320 DOPPLER ECHO COMPLETE: CPT | Mod: 26,,, | Performed by: INTERNAL MEDICINE

## 2020-08-10 PROCEDURE — 93325 DOPPLER ECHO COLOR FLOW MAPG: CPT

## 2020-08-10 PROCEDURE — 93325 DOPPLER ECHO COLOR FLOW MAPG: CPT | Mod: 26,,, | Performed by: INTERNAL MEDICINE

## 2020-08-10 PROCEDURE — 93351 STRESS ECHO (CUPID ONLY): ICD-10-PCS | Mod: 26,,, | Performed by: INTERNAL MEDICINE

## 2020-09-10 ENCOUNTER — OFFICE VISIT (OUTPATIENT)
Dept: CARDIOLOGY | Facility: CLINIC | Age: 35
End: 2020-09-10
Payer: MEDICAID

## 2020-09-10 DIAGNOSIS — E66.9 NON MORBID OBESITY, UNSPECIFIED OBESITY TYPE: ICD-10-CM

## 2020-09-10 DIAGNOSIS — R07.89 CHEST PAIN, NON-CARDIAC: Primary | ICD-10-CM

## 2020-09-10 PROCEDURE — 99213 PR OFFICE/OUTPT VISIT, EST, LEVL III, 20-29 MIN: ICD-10-PCS | Mod: 95,,, | Performed by: INTERNAL MEDICINE

## 2020-09-10 PROCEDURE — 99213 OFFICE O/P EST LOW 20 MIN: CPT | Mod: 95,,, | Performed by: INTERNAL MEDICINE

## 2020-09-10 NOTE — PROGRESS NOTES
CARDIOVASCULAR PROGRESS NOTE    The patient location is: [place]  The chief complaint leading to consultation is: CP    Visit type: audiovisual    Face to Face time with patient: 10min  15 minutes of total time spent on the encounter, which includes face to face time and non-face to face time preparing to see the patient (eg, review of tests), Obtaining and/or reviewing separately obtained history, Documenting clinical information in the electronic or other health record, Independently interpreting results (not separately reported) and communicating results to the patient/family/caregiver, or Care coordination (not separately reported).         Each patient to whom he or she provides medical services by telemedicine is:  (1) informed of the relationship between the physician and patient and the respective role of any other health care provider with respect to management of the patient; and (2) notified that he or she may decline to receive medical services by telemedicine and may withdraw from such care at any time.    Notes:       REASON FOR CONSULT:   Cristo Ruff is a 35 y.o. male who presents for f/u CP, testing.    PCP: Lalita Lara: [place]  HISTORY OF PRESENT ILLNESS:   The patient returns for follow-up of his stress echocardiogram.  In the interim since his last office visit he underwent successful hernia repair surgery.  He tells me he has had occasional chest discomfort which is relieved with Pepcid.  He did not have any chest discomfort during his stress test.  His stress test was otherwise normal.  He denies palpitations, lightheadedness, dizziness, or syncope.  There has been no PND, orthopnea, melena, hematuria, or claudicant symptoms.    Given the results of his stress echocardiogram, and his symptoms which apparently resolved with antacids, I suggested he follow-up with his primary care physician and consider GI evaluation if symptoms persist.    CARDIOVASCULAR HISTORY:   none    PAST MEDICAL  HISTORY:     Past Medical History:   Diagnosis Date    Gastric ulcer due to Helicobacter pylori     Gastric ulcer, acute     GERD (gastroesophageal reflux disease)     Hyperlipemia        PAST SURGICAL HISTORY:     Past Surgical History:   Procedure Laterality Date    HERNIA REPAIR         ALLERGIES AND MEDICATION:   Review of patient's allergies indicates:  No Known Allergies     Medication List          Accurate as of September 10, 2020  1:13 PM. If you have any questions, ask your nurse or doctor.            CONTINUE taking these medications    acetaminophen 650 MG Tbsr  Commonly known as: TYLENOL  Take 1 tablet (650 mg total) by mouth every 8 (eight) hours.     famotidine 20 MG tablet  Commonly known as: PEPCID  Take 1 tablet (20 mg total) by mouth once daily.            SOCIAL HISTORY:     Social History     Socioeconomic History    Marital status: Single     Spouse name: Not on file    Number of children: Not on file    Years of education: Not on file    Highest education level: Not on file   Occupational History    Occupation:     Occupation: Automotive detailing   Social Needs    Financial resource strain: Not on file    Food insecurity     Worry: Not on file     Inability: Not on file    Transportation needs     Medical: Not on file     Non-medical: Not on file   Tobacco Use    Smoking status: Former Smoker    Smokeless tobacco: Never Used   Substance and Sexual Activity    Alcohol use: Yes     Frequency: 2-4 times a month     Drinks per session: 1 or 2     Comment: occasionally    Drug use: No    Sexual activity: Yes     Partners: Female     Birth control/protection: None   Lifestyle    Physical activity     Days per week: Not on file     Minutes per session: Not on file    Stress: Not on file   Relationships    Social connections     Talks on phone: Not on file     Gets together: Not on file     Attends Evangelical service: Not on file     Active member of club or  organization: Not on file     Attends meetings of clubs or organizations: Not on file     Relationship status: Not on file   Other Topics Concern    Not on file   Social History Narrative    Not on file       FAMILY HISTORY:     Family History   Problem Relation Age of Onset    Hypertension Mother     Diabetes Mellitus Mother     Kidney failure Mother     Hypertension Father     Diabetes Mellitus Father     Hypertension Sister        REVIEW OF SYSTEMS:   Review of Systems   Constitutional: Negative for chills, diaphoresis and fever.   HENT: Negative for nosebleeds.    Eyes: Negative for blurred vision, double vision and photophobia.   Respiratory: Negative for hemoptysis, shortness of breath and wheezing.    Cardiovascular: Negative for chest pain, palpitations, orthopnea, claudication, leg swelling and PND.   Gastrointestinal: Positive for heartburn. Negative for abdominal pain, blood in stool, melena, nausea and vomiting.   Genitourinary: Negative for flank pain and hematuria.   Musculoskeletal: Negative for falls, myalgias and neck pain.   Skin: Negative for rash.   Neurological: Negative for dizziness, seizures, loss of consciousness, weakness and headaches.   Endo/Heme/Allergies: Negative for polydipsia. Does not bruise/bleed easily.   Psychiatric/Behavioral: Negative for depression and memory loss. The patient is not nervous/anxious.        PHYSICAL EXAM:     There were no vitals filed for this visit. There is no height or weight on file to calculate BMI.            General:  Well-appearing, well-nourished and in no apparent distress.  HEENT:  NC/AT, EOMI, anicteric sclera.  Neck:  Supple, trachea midline.  CNS:  Alert and oriented x3, cranial nerves 2-12 grossly intact.  Psych:  Normal mood and affect.      Per OV 7/16/20  Physical Exam  Vitals signs reviewed.   Constitutional:       General: He is not in acute distress.     Appearance: He is well-developed. He is obese. He is not ill-appearing,  toxic-appearing or diaphoretic.   HENT:      Head: Normocephalic and atraumatic.   Eyes:      General: No scleral icterus.     Conjunctiva/sclera: Conjunctivae normal.      Pupils: Pupils are equal, round, and reactive to light.   Neck:      Musculoskeletal: Neck supple. No edema or neck rigidity.      Thyroid: No thyromegaly.      Vascular: Normal carotid pulses. No carotid bruit or JVD.      Trachea: Trachea normal.   Cardiovascular:      Rate and Rhythm: Normal rate and regular rhythm.      Pulses:           Carotid pulses are 2+ on the right side and 2+ on the left side.     Heart sounds: S1 normal and S2 normal. No murmur. No friction rub. No gallop.    Pulmonary:      Effort: Pulmonary effort is normal. No respiratory distress.      Breath sounds: Normal breath sounds. No stridor. No wheezing, rhonchi or rales.   Chest:      Chest wall: No tenderness.   Abdominal:      General: There is no distension.      Palpations: Abdomen is soft.   Musculoskeletal: Normal range of motion.         General: No swelling or tenderness.   Feet:      Right foot:      Skin integrity: No ulcer.      Left foot:      Skin integrity: No ulcer.   Skin:     General: Skin is warm and dry.      Findings: No erythema or rash.   Neurological:      Mental Status: He is alert and oriented to person, place, and time.      Cranial Nerves: No cranial nerve deficit.   Psychiatric:         Speech: Speech normal.         Behavior: Behavior normal. Behavior is cooperative.         DATA:   EKG: (personally reviewed tracing)  6/26/20 SR 61    Laboratory:  CBC:  Recent Labs   Lab 02/09/19  0841   WBC 8.52   Hemoglobin 14.1   Hematocrit 46.9   Platelets 227       CHEMISTRIES:  Recent Labs   Lab 02/09/19  0841   Glucose 104   Sodium 139   Potassium 4.6   BUN, Bld 15   Creatinine 1.1   eGFR if African American >60.0   eGFR if non African American >60.0   Calcium 10.3       CARDIAC BIOMARKERS:        COAGS:        LIPIDS/LFTS:  Recent Labs   Lab  02/09/19  0841   Cholesterol 231 H   Triglycerides 82   HDL 47   LDL Cholesterol 167.6 H   Non-HDL Cholesterol 184   AST 17   ALT 20       Cardiovascular Testing:  Ex stress echo 8/10/20  · Normal left ventricular systolic function. The estimated ejection fraction is 55%.  · Normal LV diastolic function.  · Normal right ventricular systolic function.  · The estimated PA systolic pressure is 30 mmHg.  · There were no arrhythmias during stress.  · The ECG portion of this study is negative for myocardial ischemia. (Gerald 9:03, 10 METS, 85% MPHR)  · The stress echo portion of this study is negative for myocardial ischemia.    ASSESSMENT:   # CP, atyp, ?GI.  DARSHAN normal 8/2020  # HLP  # BMI 32    PLAN:   No specific cardiac rx  Diet/exercise/weight loss  Consider repeat lipid profile in 6 months (Jan 2021) after efforts toward lifestyle modification undertaken.  I will defer this to the pt's PCP.  RTC prn    Jason Cedillo MD, FACC

## 2020-10-05 ENCOUNTER — PATIENT MESSAGE (OUTPATIENT)
Dept: ADMINISTRATIVE | Facility: HOSPITAL | Age: 35
End: 2020-10-05

## 2020-10-07 ENCOUNTER — HOSPITAL ENCOUNTER (EMERGENCY)
Facility: HOSPITAL | Age: 35
Discharge: HOME | End: 2020-10-07
Attending: EMERGENCY MEDICINE
Payer: MEDICAID

## 2020-10-07 VITALS
OXYGEN SATURATION: 98 % | DIASTOLIC BLOOD PRESSURE: 78 MMHG | WEIGHT: 224 LBS | TEMPERATURE: 99 F | BODY MASS INDEX: 36 KG/M2 | HEIGHT: 66 IN | RESPIRATION RATE: 18 BRPM | HEART RATE: 81 BPM | SYSTOLIC BLOOD PRESSURE: 126 MMHG

## 2020-10-07 DIAGNOSIS — M79.602 PARESTHESIA AND PAIN OF BOTH UPPER EXTREMITIES: ICD-10-CM

## 2020-10-07 DIAGNOSIS — K21.9 GASTRIC REFLUX: Primary | ICD-10-CM

## 2020-10-07 DIAGNOSIS — R20.2 PARESTHESIA AND PAIN OF BOTH UPPER EXTREMITIES: ICD-10-CM

## 2020-10-07 DIAGNOSIS — M79.601 PARESTHESIA AND PAIN OF BOTH UPPER EXTREMITIES: ICD-10-CM

## 2020-10-07 PROCEDURE — 99283 EMERGENCY DEPT VISIT LOW MDM: CPT | Mod: ER

## 2020-10-07 RX ORDER — METHOCARBAMOL 500 MG/1
1000 TABLET, FILM COATED ORAL 3 TIMES DAILY
Qty: 30 TABLET | Refills: 0 | Status: SHIPPED | OUTPATIENT
Start: 2020-10-07 | End: 2020-10-12

## 2020-10-07 NOTE — Clinical Note
"Cristo Hansenua" Speedy was seen and treated in our emergency department on 10/7/2020.  He may return to work on 10/09/2020.       If you have any questions or concerns, please don't hesitate to call.      Jennifer Wellington NP"

## 2020-10-08 NOTE — ED PROVIDER NOTES
"Encounter Date: 10/7/2020    SCRIBE #1 NOTE: I, Francesca Joseph, am scribing for, and in the presence of,  Jennifer Wellington NP. I have scribed the following portions of the note - Other sections scribed: HPI, ROS, PE.       History     Chief Complaint   Patient presents with    Dizziness     pt reports after work earlier today he began feeling lightheaded and nauseous. denies vomiting, CP, SOB, fever or chills. denies taking any medicine for symptoms. only reports nausea at this time    Nausea     Cristo Ruff is a 35 y.o. male who presents to the ED for evaluation of nausea, lightheadedness, warm feeling to shoulders and unable to belch.  Patient reports a home temperature of 98 F and 99 F. He states he feels "hot" in his neck. Patient states that he returned home after work and ate a meal after which he fell asleep.  He states that he woke and was an able to belch and felt warm all over.  He also states that this time he became somewhat lightheaded and nauseated and thought he might have a fever.  He denies spinning room, near syncope, vomiting or any other associated symptoms  He reports multiple cardiac workups to include a negative stress test for chest discomfort.  He states he was diagnosed with gastric reflux/indigestion. He reports past similar symptoms.  Patient reports his primary care provider is encouraged him to follow up with GI doctor for evaluation of these ongoing symptoms.    The history is provided by the patient. No  was used.     Review of patient's allergies indicates:  No Known Allergies  Past Medical History:   Diagnosis Date    Gastric ulcer due to Helicobacter pylori     Gastric ulcer, acute     GERD (gastroesophageal reflux disease)     Hyperlipemia      Past Surgical History:   Procedure Laterality Date    HERNIA REPAIR       Family History   Problem Relation Age of Onset    Hypertension Mother     Diabetes Mellitus Mother     Kidney failure Mother  "    Hypertension Father     Diabetes Mellitus Father     Hypertension Sister      Social History     Tobacco Use    Smoking status: Former Smoker    Smokeless tobacco: Never Used   Substance Use Topics    Alcohol use: Yes     Frequency: 2-4 times a month     Drinks per session: 1 or 2     Comment: occasionally    Drug use: No     Review of Systems   Constitutional: Negative for fever.   Cardiovascular: Negative for chest pain.   Gastrointestinal: Positive for nausea. Negative for abdominal pain, diarrhea and vomiting.   Musculoskeletal: Negative for neck pain.   Neurological: Positive for light-headedness. Negative for dizziness.   All other systems reviewed and are negative.      Physical Exam     Initial Vitals [10/07/20 1850]   BP Pulse Resp Temp SpO2   122/78 87 18 98.4 °F (36.9 °C) 97 %      MAP       --         Physical Exam    Nursing note and vitals reviewed.  Constitutional: He appears well-developed and well-nourished.   HENT:   Head: Normocephalic.   Mouth/Throat: Oropharynx is clear and moist.   Eyes: Conjunctivae and EOM are normal. Pupils are equal, round, and reactive to light.   Neck: Normal range of motion. Neck supple.   Cardiovascular: Normal rate, regular rhythm and normal heart sounds.   Pulmonary/Chest: Breath sounds normal. No respiratory distress. He has no wheezes. He has no rhonchi. He has no rales. He exhibits no tenderness.   Abdominal: Soft. There is no abdominal tenderness.   Musculoskeletal: Normal range of motion. Tenderness present. No edema.      Comments: Paraspinous cervical muscle spasms bilaterally.   Lymphadenopathy:     He has no cervical adenopathy.   Neurological: He is alert and oriented to person, place, and time. He has normal strength and normal reflexes. He displays normal reflexes. No cranial nerve deficit or sensory deficit. GCS score is 15. GCS eye subscore is 4. GCS verbal subscore is 5. GCS motor subscore is 6.       Coordination:  Rapid alternating movements  "and fine finger movements are intact. There is no dysmetria on finger-to-nose and heel-knee-shin. There are no abnormal or extraneous movements. Romberg is absent.    Gait/Stance:  Posture is normal. Gait is steady with normal steps, base, arm swing, and turning. Heel and toe walking are normal. Tandem gait is normal when the patient closes one of her eyes.     Skin: Skin is warm and dry. Capillary refill takes less than 2 seconds.   Psychiatric: He has a normal mood and affect.         ED Course   Procedures  Labs Reviewed - No data to display       Imaging Results    None          Medical Decision Making:   History:   Old Medical Records: I decided to obtain old medical records.  Differential Diagnosis:   Fear complaint, gastric reflux, vertigo  ED Management:  Diagnosis management comments: This is an urgent evaluation of a 35-year-old male that presented to the ER with c/o episode where he became lightheaded and nauseated after being unable to belch.  Patient also concerned that he may have a fever because his shoulders and upper arms feel warm all over. Pts exam was as above.     Patient has no objective findings on physical exam.  He does seem to be somewhat focused and perseverating on feeling warm and possibly having a fever to the point of requesting the nurse check it again even though it was within normal limits at triage.  I question whether there may being anxiety component to this patient's condition.  He does have bilateral muscle spasms to his cervical paraspinous muscles which may be contributing to his sensation of "warmth.  At this time I do not believe patient has any acute condition and can be followed up as outpatient.  I will give him Robaxin to help with his muscle spasms and have encouraged him to ice the area.  Patient is to follow-up with GI doctor as recommended by his primary care doctor and return to ER for worsening condition or any other concerns.    Based on exam today - I have " low suspicion for medical, surgical or other life threatening condition and I believe pt is safe for discharge and outpatient f/u.    Pt verbalizes understanding of d/c instructions and will return for worsening condition.                Scribe Attestation:   Scribe #1: I performed the above scribed service and the documentation accurately describes the services I performed. I attest to the accuracy of the note.    **  I, DIONY Wellington, personally performed the services described in this documentation.  All medical record entries made by the scribe were at my direction and in my presence.  I have reviewed the chart and agree that the record reflects my personal performance and is accurate and complete.               Clinical Impression:     ICD-10-CM ICD-9-CM   1. Gastric reflux  K21.9 530.81   2. Paresthesia and pain of both upper extremities  R20.2 782.0    M79.601 729.5    M79.602                       Disposition:   Disposition: Discharged  Condition: Stable     ED Disposition Condition    Discharge Stable        ED Prescriptions     Medication Sig Dispense Start Date End Date Auth. Provider    methocarbamoL (ROBAXIN) 500 MG Tab Take 2 tablets (1,000 mg total) by mouth 3 (three) times daily. for 5 days 30 tablet 10/7/2020 10/12/2020 Jennifer Wellington NP        Follow-up Information     Follow up With Specialties Details Why Contact Info    Jarrett Celis MD Family Medicine Schedule an appointment as soon as possible for a visit   [address]  [place] [address]  [phone]      [facility] Emergency Department Emergency Medicine  If symptoms worsen or any other concerns [address] [facility] [id number] [phone]                                       Jennifer Wellington NP  10/07/20 2052

## 2020-11-01 ENCOUNTER — HOSPITAL ENCOUNTER (EMERGENCY)
Facility: HOSPITAL | Age: 35
Discharge: HOME | End: 2020-11-01
Attending: EMERGENCY MEDICINE
Payer: MEDICAID

## 2020-11-01 VITALS
BODY MASS INDEX: 40.12 KG/M2 | OXYGEN SATURATION: 97 % | DIASTOLIC BLOOD PRESSURE: 62 MMHG | SYSTOLIC BLOOD PRESSURE: 118 MMHG | HEIGHT: 64 IN | WEIGHT: 235 LBS | HEART RATE: 62 BPM | RESPIRATION RATE: 16 BRPM | TEMPERATURE: 98 F

## 2020-11-01 DIAGNOSIS — K21.9 CHEST PAIN DUE TO GERD: Primary | ICD-10-CM

## 2020-11-01 DIAGNOSIS — R07.9 CHEST PAIN DUE TO GERD: Primary | ICD-10-CM

## 2020-11-01 PROCEDURE — 25000003 PHARM REV CODE 250: Mod: ER | Performed by: EMERGENCY MEDICINE

## 2020-11-01 PROCEDURE — 93005 ELECTROCARDIOGRAM TRACING: CPT | Mod: ER

## 2020-11-01 PROCEDURE — 93010 EKG 12-LEAD: ICD-10-PCS | Mod: ,,, | Performed by: INTERNAL MEDICINE

## 2020-11-01 PROCEDURE — 93010 ELECTROCARDIOGRAM REPORT: CPT | Mod: ,,, | Performed by: INTERNAL MEDICINE

## 2020-11-01 PROCEDURE — 99283 EMERGENCY DEPT VISIT LOW MDM: CPT | Mod: 25,ER

## 2020-11-01 RX ORDER — PANTOPRAZOLE SODIUM 20 MG/1
40 TABLET, DELAYED RELEASE ORAL DAILY
Qty: 30 TABLET | Refills: 0 | OUTPATIENT
Start: 2020-11-01 | End: 2021-07-17

## 2020-11-01 RX ORDER — PANTOPRAZOLE SODIUM 40 MG/1
40 TABLET, DELAYED RELEASE ORAL
Status: COMPLETED | OUTPATIENT
Start: 2020-11-01 | End: 2020-11-01

## 2020-11-01 RX ADMIN — PANTOPRAZOLE SODIUM 40 MG: 40 TABLET, DELAYED RELEASE ORAL at 09:11

## 2020-11-01 RX ADMIN — LIDOCAINE HYDROCHLORIDE: 20 SOLUTION ORAL; TOPICAL at 09:11

## 2020-11-02 NOTE — ED NOTES
Pt c/o L upper chest discomfort, non- radiating, worse w/ movement and deep breathing x 3 days. Pt states he has had this pain for 3 yrs. Pt is A & O x 3, denies SOB, fever, chills and N/V/D. Skin is warm, dry and pink. VS. DALJIT x 3mm. BBS- CTA. Abd- SNT. PSM x 4 exts. Will continue to monitor closely.

## 2020-11-02 NOTE — ED PROVIDER NOTES
"Encounter Date: 11/1/2020    SCRIBE #1 NOTE: I, Alexys Abarca, am scribing for, and in the presence of,  Dr. Tucker. I have scribed the following portions of the note - Other sections scribed: HPI, ROS, PE.       History     Chief Complaint   Patient presents with    CHEST DISCOMFORT     PT REPORTS CHEST DISCOMFORT OFF AND ON FOR 1 YEAR     Cristo Ruff is a 35 y.o. male with Hx of ulcers, high cholesterol, and a hernia repair who presents to the ED complaining of worsening, persistent chest discomfort s/p eating grits and sausage at 7:30am this morning. Patient states that the pain feels like "sharp pressure." Attempted treatment by drinking a coca-cola with no relief noted. Patient was not able to induce belching in attempt to relieve pressure. Patient reports a history of this issue that is normally resolved with pepcid, but he denies taking pepcid today. Patient did not eat a lunch, but he did eat chocolate today. Patient reports that he has not been sleeping well lately. Endorses pain with deep breathing and a non-productive cough.  No radiation of pain.  No abdominal pain.  Denies hematemesis, melena, nausea, vomiting, and fever.     The history is provided by the patient. No  was used.     Review of patient's allergies indicates:  No Known Allergies  Past Medical History:   Diagnosis Date    Gastric ulcer due to Helicobacter pylori     Gastric ulcer, acute     GERD (gastroesophageal reflux disease)     Hyperlipemia      Past Surgical History:   Procedure Laterality Date    HERNIA REPAIR       Family History   Problem Relation Age of Onset    Hypertension Mother     Diabetes Mellitus Mother     Kidney failure Mother     Hypertension Father     Diabetes Mellitus Father     Hypertension Sister      Social History     Tobacco Use    Smoking status: Former Smoker    Smokeless tobacco: Never Used   Substance Use Topics    Alcohol use: Yes     Frequency: 2-4 times a month "     Drinks per session: 1 or 2     Comment: occasionally    Drug use: No     Review of Systems   Constitutional: Negative for chills, diaphoresis and fever.   HENT: Negative for congestion and sore throat.    Eyes: Negative.    Respiratory: Positive for cough. Negative for shortness of breath.         Positive for chest discomfort. Positive for dyspnea.    Cardiovascular: Negative for chest pain and leg swelling.   Gastrointestinal: Negative for abdominal pain, blood in stool, diarrhea, nausea and vomiting.        Negative for hematemesis. Negative for melena.    Genitourinary: Negative for dysuria, flank pain, frequency and testicular pain.   Musculoskeletal: Negative for back pain.   Neurological: Negative for weakness, light-headedness, numbness and headaches.        No numbness   All other systems reviewed and are negative.      Physical Exam     Initial Vitals [11/01/20 2025]   BP Pulse Resp Temp SpO2   128/77 73 20 98.3 °F (36.8 °C) 98 %      MAP       --         Physical Exam    Nursing note and vitals reviewed.  Constitutional: He appears well-developed and well-nourished. He is not diaphoretic. No distress.   HENT:   Head: Normocephalic and atraumatic.   Right Ear: External ear normal.   Left Ear: External ear normal.   Nose: Nose normal.   Mouth/Throat: Oropharynx is clear and moist.   Eyes: Conjunctivae and EOM are normal. Pupils are equal, round, and reactive to light. Right eye exhibits no discharge. Left eye exhibits no discharge. No scleral icterus.   Neck: Normal range of motion. Neck supple. No tracheal deviation present.   Cardiovascular: Normal rate, regular rhythm and normal heart sounds.   No murmur heard.  Pulmonary/Chest: Breath sounds normal. No stridor. No respiratory distress. He has no wheezes. He has no rhonchi. He has no rales. He exhibits tenderness (left).   Abdominal: Soft. He exhibits no distension. There is no abdominal tenderness. There is no rebound and no guarding.    Musculoskeletal: Normal range of motion. No edema.   Neurological: He is alert and oriented to person, place, and time. He has normal strength. No cranial nerve deficit or sensory deficit. GCS score is 15. GCS eye subscore is 4. GCS verbal subscore is 5. GCS motor subscore is 6.   Skin: Skin is warm and dry. No pallor.   Psychiatric: He has a normal mood and affect. His behavior is normal. Judgment and thought content normal.         ED Course   Procedures  Labs Reviewed - No data to display  EKG Readings: (Independently Interpreted)   Initial Reading: No STEMI. Rhythm: Normal Sinus Rhythm. Heart Rate: 67. Ectopy: No Ectopy. Conduction: Normal. ST Segments: Normal ST Segments. T Waves: Normal. Clinical Impression: Normal Sinus Rhythm   No ischemic changes.       Imaging Results          X-Ray Chest PA And Lateral (Final result)  Result time 11/01/20 21:13:08    Final result by Josep Ferguson MD (11/01/20 21:13:08)                 Impression:      Stable and negative chest is imaged.      Electronically signed by: Josep Ferguson  Date:    11/01/2020  Time:    21:13             Narrative:    EXAMINATION:  XR CHEST PA AND LATERAL    CLINICAL HISTORY:  Chest pain, unspecified    TECHNIQUE:  PA and lateral views of the chest were performed.    COMPARISON:  Of 06/26/2020    FINDINGS:  The extreme apices are truncated from view.  Visualized lungs appear clear.  The heart is not enlarged and the trachea midline.  The bones are intact.  EKG leads project over the patient.                                 Medical Decision Making:   History:   Old Medical Records: I decided to obtain old medical records.  Initial Assessment:   Patient presents for evaluation of left-sided chest pain associated with eating.  Patient has had these complaints off and on for years.  Symptoms are more persistent today than normal.  Benign abdominal exam.  Mildly tender to left chest wall exam.  Normal pulmonary exam.  EKG is unremarkable.   Will check chest x-ray.  Will give GI cocktail reassess.  Differential Diagnosis:   GERD, chest wall pain  Independently Interpreted Test(s):   I have ordered and independently interpreted X-rays - see prior notes.  I have ordered and independently interpreted EKG Reading(s) - see prior notes  Clinical Tests:   Radiological Study: Ordered and Reviewed  Medical Tests: Ordered and Reviewed  ED Management:  2200:  Symptoms improved with GI cocktail.  Will start on PPI.  Will refer to Gastroenterology for persistent GERD.  No evidence of acute coronary syndrome on EKG.  Chest x-ray is unremarkable.            Scribe Attestation:   Scribe #1: I performed the above scribed service and the documentation accurately describes the services I performed. I attest to the accuracy of the note.    Reilly Tucker MD                  Clinical Impression:       ICD-10-CM ICD-9-CM   1. Chest pain due to GERD  K21.9 786.50    R07.9 530.81                      Disposition:   Disposition: Discharged  Condition: Stable     ED Disposition Condition    Discharge Stable        ED Prescriptions     Medication Sig Dispense Start Date End Date Auth. Provider    pantoprazole (PROTONIX) 20 MG tablet Take 2 tablets (40 mg total) by mouth once daily. for 20 doses 30 tablet 11/1/2020 11/21/2020 Reilly Tucker MD        Follow-up Information     Follow up With Specialties Details Why Contact Info    Jarrett Celis MD Family Medicine Call  As needed [address]  [place] [address]  [phone]      [facility] Gastroenterology Call  As needed [address]  [facility] [address]  [phone]                                       Reilly Tucker MD  11/01/20 5798

## 2020-12-11 ENCOUNTER — HOSPITAL ENCOUNTER (EMERGENCY)
Facility: HOSPITAL | Age: 35
Discharge: HOME | End: 2020-12-11
Attending: EMERGENCY MEDICINE
Payer: MEDICAID

## 2020-12-11 VITALS
OXYGEN SATURATION: 98 % | HEIGHT: 63 IN | RESPIRATION RATE: 16 BRPM | WEIGHT: 237 LBS | DIASTOLIC BLOOD PRESSURE: 78 MMHG | SYSTOLIC BLOOD PRESSURE: 125 MMHG | TEMPERATURE: 99 F | BODY MASS INDEX: 41.99 KG/M2 | HEART RATE: 63 BPM

## 2020-12-11 DIAGNOSIS — R07.9 CHEST PAIN: ICD-10-CM

## 2020-12-11 DIAGNOSIS — K21.9 GASTROESOPHAGEAL REFLUX DISEASE, UNSPECIFIED WHETHER ESOPHAGITIS PRESENT: Primary | ICD-10-CM

## 2020-12-11 LAB
ALBUMIN SERPL-MCNC: 3.9 G/DL (ref 3.3–5.5)
ALP SERPL-CCNC: 67 U/L (ref 42–141)
AMPHET+METHAMPHET UR QL: NEGATIVE
BARBITURATES UR QL SCN>200 NG/ML: NEGATIVE
BENZODIAZ UR QL SCN>200 NG/ML: NEGATIVE
BILIRUB SERPL-MCNC: 0.5 MG/DL (ref 0.2–1.6)
BILIRUBIN, POC UA: NEGATIVE
BLOOD, POC UA: NEGATIVE
BUN SERPL-MCNC: 13 MG/DL (ref 7–22)
BZE UR QL SCN: NEGATIVE
CALCIUM SERPL-MCNC: 9.6 MG/DL (ref 8–10.3)
CANNABINOIDS UR QL SCN: NEGATIVE
CHLORIDE SERPL-SCNC: 107 MMOL/L (ref 98–108)
CLARITY, POC UA: CLEAR
COLOR, POC UA: YELLOW
CREAT SERPL-MCNC: 1.1 MG/DL (ref 0.6–1.2)
CREAT UR-MCNC: 174.2 MG/DL (ref 23–375)
GLUCOSE SERPL-MCNC: 109 MG/DL (ref 73–118)
GLUCOSE, POC UA: NEGATIVE
KETONES, POC UA: NEGATIVE
LEUKOCYTE EST, POC UA: NEGATIVE
METHADONE UR QL SCN>300 NG/ML: NEGATIVE
NITRITE, POC UA: NEGATIVE
OPIATES UR QL SCN: NEGATIVE
PCP UR QL SCN>25 NG/ML: NEGATIVE
PH UR STRIP: 7.5 [PH]
POC ALT (SGPT): 23 U/L (ref 10–47)
POC AST (SGOT): 23 U/L (ref 11–38)
POC B-TYPE NATRIURETIC PEPTIDE: 6.1 PG/ML (ref 0–100)
POC CARDIAC TROPONIN I: 0.01 NG/ML
POC PTINR: 1 (ref 0.9–1.2)
POC PTWBT: 12 SEC (ref 9.7–14.3)
POC TCO2: 27 MMOL/L (ref 18–33)
POTASSIUM BLD-SCNC: 3.8 MMOL/L (ref 3.6–5.1)
PROTEIN, POC UA: NEGATIVE
PROTEIN, POC: 7.2 G/DL (ref 6.4–8.1)
SAMPLE: NORMAL
SAMPLE: NORMAL
SODIUM BLD-SCNC: 141 MMOL/L (ref 128–145)
SPECIFIC GRAVITY, POC UA: 1.02
TOXICOLOGY INFORMATION: NORMAL
UROBILINOGEN, POC UA: 0.2 E.U./DL

## 2020-12-11 PROCEDURE — 25000003 PHARM REV CODE 250: Mod: ER | Performed by: EMERGENCY MEDICINE

## 2020-12-11 PROCEDURE — 84484 ASSAY OF TROPONIN QUANT: CPT | Mod: ER

## 2020-12-11 PROCEDURE — 80053 COMPREHEN METABOLIC PANEL: CPT | Mod: ER

## 2020-12-11 PROCEDURE — 80307 DRUG TEST PRSMV CHEM ANLYZR: CPT

## 2020-12-11 PROCEDURE — 83880 ASSAY OF NATRIURETIC PEPTIDE: CPT | Mod: ER

## 2020-12-11 PROCEDURE — 93010 ELECTROCARDIOGRAM REPORT: CPT | Mod: ,,, | Performed by: INTERNAL MEDICINE

## 2020-12-11 PROCEDURE — 85610 PROTHROMBIN TIME: CPT | Mod: ER

## 2020-12-11 PROCEDURE — 93005 ELECTROCARDIOGRAM TRACING: CPT | Mod: ER

## 2020-12-11 PROCEDURE — 85025 COMPLETE CBC W/AUTO DIFF WBC: CPT | Mod: ER

## 2020-12-11 PROCEDURE — 81003 URINALYSIS AUTO W/O SCOPE: CPT | Mod: ER

## 2020-12-11 PROCEDURE — 99284 EMERGENCY DEPT VISIT MOD MDM: CPT | Mod: 25,ER

## 2020-12-11 PROCEDURE — 93010 EKG 12-LEAD: ICD-10-PCS | Mod: ,,, | Performed by: INTERNAL MEDICINE

## 2020-12-11 RX ORDER — OMEPRAZOLE 40 MG/1
40 CAPSULE, DELAYED RELEASE ORAL EVERY MORNING
Qty: 30 CAPSULE | Refills: 0 | Status: SHIPPED | OUTPATIENT
Start: 2020-12-11 | End: 2021-07-17

## 2020-12-11 RX ORDER — ASPIRIN 325 MG
325 TABLET ORAL
Status: COMPLETED | OUTPATIENT
Start: 2020-12-11 | End: 2020-12-11

## 2020-12-11 RX ADMIN — ASPIRIN 325 MG ORAL TABLET 325 MG: 325 PILL ORAL at 04:12

## 2020-12-11 NOTE — ED PROVIDER NOTES
"Encounter Date: 12/11/2020       History     Chief Complaint   Patient presents with    Chest Pain     Pt to ERwith c/o left sided chest pain on and off x 1 day. reports "some dizziness."      Cristo Ruff is a 35 y.o. male who presents to the ED for evaluation mid-sternal chest  pain since last night. Patient denies any heavy lifting. He reports he was recently evaluated at this facility for similar symptoms, but did not follow up with cardiologist. He reports a hx of GERD. He takes Pepcid as needed. Denies nausea or vomiting. Patient reports he had a stress test performed 3 months ago and states it was normal.  Patient reports eating fried chicken late last night and began to have heartburn. Pt states he ate a hot sausage poboy and and began to have increase pain today. Pt has has of GERD, gastric ulcer, and hyperlipidemia. He denies radiation of pain to left arm, jaw, nausea, vomiting, or diaphoresis.  Pain has currently resolved.    The history is provided by the patient. No  was used.   Chest Pain  The current episode started yesterday. The chest pain is currently at 7/10. The quality of the pain is described as burning. The pain does not radiate. Pertinent negatives for primary symptoms include no fever, no shortness of breath, no cough, no abdominal pain, no nausea and no vomiting.   Pertinent negatives for associated symptoms include no weakness.   His past medical history is significant for hyperlipidemia.     Review of patient's allergies indicates:  No Known Allergies  Past Medical History:   Diagnosis Date    Gastric ulcer due to Helicobacter pylori     Gastric ulcer, acute     GERD (gastroesophageal reflux disease)     Hyperlipemia      Past Surgical History:   Procedure Laterality Date    HERNIA REPAIR       Family History   Problem Relation Age of Onset    Hypertension Mother     Diabetes Mellitus Mother     Kidney failure Mother     Hypertension Father     " Diabetes Mellitus Father     Hypertension Sister      Social History     Tobacco Use    Smoking status: Former Smoker    Smokeless tobacco: Never Used   Substance Use Topics    Alcohol use: Yes     Frequency: 2-4 times a month     Drinks per session: 1 or 2     Comment: occasionally    Drug use: No     Review of Systems   Constitutional: Negative.  Negative for chills and fever.   HENT: Negative.    Eyes: Negative.    Respiratory: Negative.  Negative for cough and shortness of breath.    Cardiovascular: Positive for chest pain.   Gastrointestinal: Negative.  Negative for abdominal pain, diarrhea, nausea and vomiting.   Endocrine: Negative.    Genitourinary: Negative.    Musculoskeletal: Negative.  Negative for myalgias.   Skin: Negative.    Allergic/Immunologic: Negative.    Neurological: Negative.  Negative for weakness.   Hematological: Negative.    Psychiatric/Behavioral: Negative.    All other systems reviewed and are negative.      Physical Exam     Initial Vitals [12/11/20 1534]   BP Pulse Resp Temp SpO2   (!) 162/85 75 18 98.5 °F (36.9 °C) 95 %      MAP       --         Physical Exam    Nursing note and vitals reviewed.  Constitutional: He appears well-developed.   HENT:   Head: Normocephalic.   Nose: Nose normal.   Mouth/Throat: Oropharynx is clear and moist.   Eyes: Conjunctivae are normal.   Neck: Normal range of motion. Neck supple.   Cardiovascular: Normal rate, regular rhythm, S1 normal, S2 normal and normal heart sounds. Exam reveals no gallop and no friction rub.    No murmur heard.  Pulmonary/Chest: Breath sounds normal. No respiratory distress. He has no wheezes. He has no rhonchi. He has no rales.   Abdominal: Soft. There is no abdominal tenderness.   Musculoskeletal: Normal range of motion.   Neurological: He is alert and oriented to person, place, and time.   Skin: Skin is warm and dry. Capillary refill takes less than 2 seconds.   Psychiatric: He has a normal mood and affect. His behavior  is normal.         ED Course   Procedures  Labs Reviewed   DRUG SCREEN PANEL, URINE EMERGENCY   TROPONIN ISTAT   POCT CBC   POCT URINALYSIS W/O SCOPE   POCT URINALYSIS W/O SCOPE   POCT CMP   POCT PROTIME-INR   POCT TROPONIN   POCT B-TYPE NATRIURETIC PEPTIDE (BNP)   ISTAT PROCEDURE   POCT CMP   POCT B-TYPE NATRIURETIC PEPTIDE (BNP)         EKG Readings: (Independently Interpreted)   Initial Reading: No STEMI. Previous EKG: Compared with most recent EKG Previous EKG Date: 11/1/20. Rhythm: Normal Sinus Rhythm. Heart Rate: 67. Ectopy: No Ectopy. Conduction: Normal. ST Segments: Normal ST Segments. T Waves: Normal. Clinical Impression: Normal Sinus Rhythm     ECG Results          EKG 12-lead (In process)  Result time 12/11/20 16:47:59    In process by Interface, Lab In [facility] (12/11/20 16:47:59)                 Narrative:    Test Reason : R07.9,    Vent. Rate : 067 BPM     Atrial Rate : 067 BPM     P-R Int : 156 ms          QRS Dur : 098 ms      QT Int : 356 ms       P-R-T Axes : 022 030 052 degrees     QTc Int : 376 ms    Normal sinus rhythm  Normal ECG  When compared with ECG of 01-NOV-2020 20:28,  No significant change was found    Referred By: [email or web address]   SELF           Confirmed By:                             Imaging Results          X-Ray Chest PA And Lateral (Final result)  Result time 12/11/20 16:48:18    Final result by Anant Perry MD (12/11/20 16:48:18)                 Impression:      No acute cardiopulmonary processes.      Electronically signed by: Anant Perry MD  Date:    12/11/2020  Time:    16:48             Narrative:    EXAMINATION:  XR CHEST PA AND LATERAL    CLINICAL HISTORY:  Chest Pain;    TECHNIQUE:  PA and lateral views of the chest were performed.    COMPARISON:  Chest 11/01/2020    FINDINGS:  Heart size is within normal limits.  Mediastinum is unremarkable.    There are no acute lobar consolidations or pneumothorax or pulmonary vascular congestion.  Hilar structures are symmetric and within  normal limits bilaterally.  The visualized ribs demonstrate no significant abnormalities.  There is no pleural effusions.  There are cardiac monitoring leads over the chest.                                 Medical Decision Making:   History:   Old Medical Records: I decided to obtain old medical records.  Initial Assessment:   Cristo Ruff is a 35 y.o. male who presents to the ED for evaluation mid-sternal chest  pain since last night. Patient denies any heavy lifting. He reports he was recently evaluated at this facility for similar symptoms, but did not follow up with cardiologist. He reports a hx of GERD. He takes Pepcid as needed. Denies nausea or vomiting. Patient reports he had a stress test performed 3 months ago and states it was normal.  Patient reports eating fried chicken late last night and began to have heartburn. Pt states he ate a hot sausage poboy and and began to have increase pain today. Pt denies radiation of pain.   Pain has currently resolved.    Differential Diagnosis:   Acute MI, GERD. Pneumonia, Chest wall pain  Independently Interpreted Test(s):   I have ordered and independently interpreted X-rays - see prior notes.  I have ordered and independently interpreted EKG Reading(s) - see prior notes  Clinical Tests:   Lab Tests: Ordered and Reviewed  Radiological Study: Ordered and Reviewed  Medical Tests: Ordered and Reviewed  ED Management:  Physical exam.  Pain currently resolved.  Discuss dietary apparent with patient.  Patient instructed on adhering to a bland diet.  Will change Pepcid to omeprazole.  Patient referred to [facility] family practice for GI referral. Discharged with omeprazole.   Follow-up with [facility] in 3 days.             Scribe Attestation:   Scribe #1: I performed the above scribed service and the documentation accurately describes the services I performed. I attest to the accuracy of the note.    This document was produced by a scribe under my direction and  in my presence. I agree with the content of the note and have made any necessary edits.     MARYELLEN Ellison    12/11/2020 9:36 PM                  Clinical Impression:       ICD-10-CM ICD-9-CM   1. Gastroesophageal reflux disease, unspecified whether esophagitis present  K21.9 530.81   2. Chest pain  R07.9 786.50                          ED Disposition Condition    Discharge Stable        ED Prescriptions     Medication Sig Dispense Start Date End Date Auth. Provider    omeprazole (PRILOSEC) 40 MG capsule Take 1 capsule (40 mg total) by mouth every morning. 30 capsule 12/11/2020 1/10/2021 MARYELLEN Thacker        Follow-up Information     Follow up With Specialties Details Why Contact Info Additional Information    [facility] - Family Medicine Family Medicine In 2 days  [address], [address]  [facility] [address] [phone] Covid-19 testing - [facility], [address] 2nd Floor [facility] [address] At this time [facility] will only use these entries [facility], [facility], and Emergency Department due to COVID-19 precautions.                                        MARYELLEN Thacker  12/11/20 8722

## 2020-12-11 NOTE — FIRST PROVIDER EVALUATION
" Emergency Department TeleTriage Encounter Note      CHIEF COMPLAINT    Chief Complaint   Patient presents with    Chest Pain     Pt to ERwith c/o left sided chest pain on and off x 1 day. reports "some dizziness."        VITAL SIGNS   Initial Vitals [12/11/20 1534]   BP Pulse Resp Temp SpO2   (!) 162/85 75 18 98.5 °F (36.9 °C) 95 %      MAP       --            ALLERGIES    Review of patient's allergies indicates:  No Known Allergies    PROVIDER TRIAGE NOTE  Patient is a 35-year-old male who presents with intermittent left-sided chest pain.  He denies shortness of breath.  Does report nausea and vomiting.  He states the pain is worse with eating but was concerned because he has high cholesterol.      ORDERS  Labs Reviewed - No data to display    ED Orders (720h ago, onward)    None            Virtual Visit Note: The provider triage portion of this emergency department evaluation and documentation was performed via [employer], a HIPAA-compliant telemedicine application, in concert with a tele-presenter in the room. A face to face patient evaluation with one of my colleagues will occur once the patient is placed in an emergency department room.      DISCLAIMER: This note was prepared with [employer] voice recognition transcription software. Garbled syntax, mangled pronouns, and other bizarre constructions may be attributed to that software system.  "

## 2020-12-11 NOTE — ED TRIAGE NOTES
Pt presents to ED for left sided cp started 3 days ago worsening today.  States has some radiation to lower, anterior chest wall and upper abdomen area.  Denies SOB at this time.  AAOX4 Resp even and unlabored.  No acute distress noted at this time.  Will continue to monitor

## 2021-01-04 ENCOUNTER — PATIENT MESSAGE (OUTPATIENT)
Dept: ADMINISTRATIVE | Facility: HOSPITAL | Age: 36
End: 2021-01-04

## 2021-03-16 ENCOUNTER — HOSPITAL ENCOUNTER (EMERGENCY)
Facility: HOSPITAL | Age: 36
Discharge: HOME | End: 2021-03-17
Attending: EMERGENCY MEDICINE
Payer: MEDICAID

## 2021-03-16 VITALS
RESPIRATION RATE: 19 BRPM | OXYGEN SATURATION: 98 % | DIASTOLIC BLOOD PRESSURE: 82 MMHG | SYSTOLIC BLOOD PRESSURE: 114 MMHG | BODY MASS INDEX: 41.98 KG/M2 | HEART RATE: 91 BPM | WEIGHT: 237 LBS | TEMPERATURE: 99 F

## 2021-03-16 DIAGNOSIS — S60.222A CONTUSION OF LEFT HAND, INITIAL ENCOUNTER: Primary | ICD-10-CM

## 2021-03-16 PROCEDURE — 29130 APPL FINGER SPLINT STATIC: CPT

## 2021-03-16 PROCEDURE — 99283 EMERGENCY DEPT VISIT LOW MDM: CPT | Mod: 25,ER

## 2021-03-17 RX ORDER — NAPROXEN 500 MG/1
500 TABLET ORAL 2 TIMES DAILY WITH MEALS
Qty: 20 TABLET | Refills: 0 | OUTPATIENT
Start: 2021-03-17 | End: 2022-11-25

## 2021-03-17 RX ORDER — KETOROLAC TROMETHAMINE 30 MG/ML
15 INJECTION, SOLUTION INTRAMUSCULAR; INTRAVENOUS
Status: DISCONTINUED | OUTPATIENT
Start: 2021-03-17 | End: 2021-03-17

## 2021-04-05 ENCOUNTER — PATIENT MESSAGE (OUTPATIENT)
Dept: ADMINISTRATIVE | Facility: HOSPITAL | Age: 36
End: 2021-04-05

## 2021-04-16 ENCOUNTER — PATIENT MESSAGE (OUTPATIENT)
Dept: RESEARCH | Facility: HOSPITAL | Age: 36
End: 2021-04-16

## 2021-04-18 ENCOUNTER — HOSPITAL ENCOUNTER (EMERGENCY)
Facility: HOSPITAL | Age: 36
Discharge: HOME | End: 2021-04-18
Attending: EMERGENCY MEDICINE
Payer: MEDICAID

## 2021-04-18 VITALS
RESPIRATION RATE: 18 BRPM | TEMPERATURE: 98 F | HEIGHT: 71 IN | DIASTOLIC BLOOD PRESSURE: 82 MMHG | WEIGHT: 232 LBS | SYSTOLIC BLOOD PRESSURE: 128 MMHG | OXYGEN SATURATION: 99 % | BODY MASS INDEX: 32.48 KG/M2 | HEART RATE: 71 BPM

## 2021-04-18 DIAGNOSIS — J32.9 RHINOSINUSITIS: ICD-10-CM

## 2021-04-18 DIAGNOSIS — H61.23 BILATERAL IMPACTED CERUMEN: Primary | ICD-10-CM

## 2021-04-18 PROCEDURE — 69209 REMOVE IMPACTED EAR WAX UNI: CPT | Mod: 50,ER

## 2021-04-18 PROCEDURE — 99284 EMERGENCY DEPT VISIT MOD MDM: CPT | Mod: 25,ER

## 2021-04-18 RX ORDER — NEOMYCIN SULFATE, POLYMYXIN B SULFATE AND HYDROCORTISONE 10; 3.5; 1 MG/ML; MG/ML; [USP'U]/ML
4 SUSPENSION/ DROPS AURICULAR (OTIC) 3 TIMES DAILY
Qty: 6 ML | Refills: 0 | Status: SHIPPED | OUTPATIENT
Start: 2021-04-18 | End: 2021-04-25

## 2021-04-18 RX ORDER — LORATADINE 10 MG/1
10 TABLET ORAL EVERY MORNING
Qty: 60 TABLET | Refills: 0 | OUTPATIENT
Start: 2021-04-18 | End: 2021-07-17

## 2021-04-18 RX ORDER — MONTELUKAST SODIUM 10 MG/1
10 TABLET ORAL NIGHTLY
Qty: 30 TABLET | Refills: 0 | Status: SHIPPED | OUTPATIENT
Start: 2021-04-18 | End: 2021-05-18

## 2021-04-18 RX ORDER — FLUTICASONE PROPIONATE 50 MCG
2 SPRAY, SUSPENSION (ML) NASAL DAILY
Qty: 16 G | Refills: 0 | OUTPATIENT
Start: 2021-04-18 | End: 2021-07-17

## 2021-07-06 ENCOUNTER — PATIENT MESSAGE (OUTPATIENT)
Dept: ADMINISTRATIVE | Facility: HOSPITAL | Age: 36
End: 2021-07-06

## 2021-07-17 ENCOUNTER — HOSPITAL ENCOUNTER (EMERGENCY)
Facility: HOSPITAL | Age: 36
Discharge: HOME | End: 2021-07-17
Attending: EMERGENCY MEDICINE
Payer: MEDICAID

## 2021-07-17 VITALS
HEIGHT: 71 IN | DIASTOLIC BLOOD PRESSURE: 80 MMHG | BODY MASS INDEX: 30.94 KG/M2 | OXYGEN SATURATION: 95 % | WEIGHT: 221 LBS | SYSTOLIC BLOOD PRESSURE: 129 MMHG | TEMPERATURE: 98 F | HEART RATE: 74 BPM | RESPIRATION RATE: 20 BRPM

## 2021-07-17 DIAGNOSIS — J32.9 RHINOSINUSITIS: ICD-10-CM

## 2021-07-17 DIAGNOSIS — R07.9 CHEST PAIN, UNSPECIFIED TYPE: Primary | ICD-10-CM

## 2021-07-17 LAB
ALBUMIN SERPL-MCNC: 4.4 G/DL (ref 3.3–5.5)
ALLENS TEST: ABNORMAL
ALP SERPL-CCNC: 69 U/L (ref 42–141)
BILIRUB SERPL-MCNC: 0.6 MG/DL (ref 0.2–1.6)
BUN SERPL-MCNC: 11 MG/DL (ref 7–22)
CALCIUM SERPL-MCNC: 10 MG/DL (ref 8–10.3)
CHLORIDE SERPL-SCNC: 107 MMOL/L (ref 98–108)
CREAT SERPL-MCNC: 1.1 MG/DL (ref 0.6–1.2)
GLUCOSE SERPL-MCNC: 105 MG/DL (ref 73–118)
HCO3 UR-SCNC: 27.8 MMOL/L (ref 24–28)
LDH SERPL L TO P-CCNC: 0.93 MMOL/L (ref 0.5–2.2)
PCO2 BLDA: 45.7 MMHG (ref 35–45)
PH SMN: 7.39 [PH] (ref 7.35–7.45)
PO2 BLDA: 41 MMHG (ref 40–60)
POC ALT (SGPT): 20 U/L (ref 10–47)
POC AST (SGOT): 26 U/L (ref 11–38)
POC B-TYPE NATRIURETIC PEPTIDE: 7.1 PG/ML (ref 0–100)
POC BE: 2 MMOL/L
POC CARDIAC TROPONIN I: 0 NG/ML
POC D-DI: 819 NG/ML (ref 0–450)
POC SATURATED O2: 75 % (ref 95–100)
POC TCO2: 27 MMOL/L (ref 18–33)
POC TCO2: 29 MMOL/L (ref 24–29)
POTASSIUM BLD-SCNC: 3.9 MMOL/L (ref 3.6–5.1)
PROTEIN, POC: 8.1 G/DL (ref 6.4–8.1)
SAMPLE: ABNORMAL
SAMPLE: NORMAL
SITE: ABNORMAL
SODIUM BLD-SCNC: 143 MMOL/L (ref 128–145)

## 2021-07-17 PROCEDURE — 83880 ASSAY OF NATRIURETIC PEPTIDE: CPT | Mod: ER

## 2021-07-17 PROCEDURE — 25500020 PHARM REV CODE 255: Mod: ER | Performed by: EMERGENCY MEDICINE

## 2021-07-17 PROCEDURE — 96374 THER/PROPH/DIAG INJ IV PUSH: CPT | Mod: ER

## 2021-07-17 PROCEDURE — 82803 BLOOD GASES ANY COMBINATION: CPT | Mod: ER

## 2021-07-17 PROCEDURE — 63600175 PHARM REV CODE 636 W HCPCS: Mod: ER | Performed by: EMERGENCY MEDICINE

## 2021-07-17 PROCEDURE — 99285 EMERGENCY DEPT VISIT HI MDM: CPT | Mod: 25,ER

## 2021-07-17 PROCEDURE — 84484 ASSAY OF TROPONIN QUANT: CPT | Mod: ER

## 2021-07-17 PROCEDURE — 80053 COMPREHEN METABOLIC PANEL: CPT | Mod: ER

## 2021-07-17 PROCEDURE — 85025 COMPLETE CBC W/AUTO DIFF WBC: CPT | Mod: ER

## 2021-07-17 PROCEDURE — 85379 FIBRIN DEGRADATION QUANT: CPT | Mod: ER

## 2021-07-17 RX ORDER — PANTOPRAZOLE SODIUM 20 MG/1
20 TABLET, DELAYED RELEASE ORAL DAILY
Qty: 30 TABLET | Refills: 0 | Status: SHIPPED | OUTPATIENT
Start: 2021-07-17 | End: 2023-01-31 | Stop reason: ALTCHOICE

## 2021-07-17 RX ORDER — FAMOTIDINE 20 MG/1
20 TABLET, FILM COATED ORAL 2 TIMES DAILY
Qty: 28 TABLET | Refills: 0 | Status: SHIPPED | OUTPATIENT
Start: 2021-07-17 | End: 2023-01-31 | Stop reason: ALTCHOICE

## 2021-07-17 RX ORDER — NAPROXEN SODIUM 220 MG/1
81 TABLET, FILM COATED ORAL DAILY
Qty: 30 TABLET | Refills: 0 | Status: SHIPPED | OUTPATIENT
Start: 2021-07-17 | End: 2023-01-31 | Stop reason: ALTCHOICE

## 2021-07-17 RX ORDER — MONTELUKAST SODIUM 10 MG/1
10 TABLET ORAL NIGHTLY
Qty: 30 TABLET | Refills: 0 | Status: SHIPPED | OUTPATIENT
Start: 2021-07-17 | End: 2021-08-16

## 2021-07-17 RX ORDER — KETOROLAC TROMETHAMINE 30 MG/ML
INJECTION, SOLUTION INTRAMUSCULAR; INTRAVENOUS
Status: DISCONTINUED
Start: 2021-07-17 | End: 2021-07-18 | Stop reason: HOSPADM

## 2021-07-17 RX ORDER — KETOROLAC TROMETHAMINE 30 MG/ML
15 INJECTION, SOLUTION INTRAMUSCULAR; INTRAVENOUS
Status: COMPLETED | OUTPATIENT
Start: 2021-07-17 | End: 2021-07-17

## 2021-07-17 RX ORDER — FLUTICASONE PROPIONATE 50 MCG
2 SPRAY, SUSPENSION (ML) NASAL DAILY
Qty: 16 G | Refills: 0 | OUTPATIENT
Start: 2021-07-17 | End: 2022-11-25

## 2021-07-17 RX ADMIN — KETOROLAC TROMETHAMINE 15 MG: 30 INJECTION, SOLUTION INTRAMUSCULAR; INTRAVENOUS at 08:07

## 2021-07-17 RX ADMIN — IOHEXOL 100 ML: 350 INJECTION, SOLUTION INTRAVENOUS at 07:07

## 2021-09-16 ENCOUNTER — HOSPITAL ENCOUNTER (EMERGENCY)
Facility: HOSPITAL | Age: 36
Discharge: HOME | End: 2021-09-17
Attending: EMERGENCY MEDICINE
Payer: MEDICAID

## 2021-09-16 DIAGNOSIS — R00.0 TACHYCARDIA: ICD-10-CM

## 2021-09-16 DIAGNOSIS — R00.2 PALPITATIONS: Primary | ICD-10-CM

## 2021-09-16 PROCEDURE — 93005 ELECTROCARDIOGRAM TRACING: CPT | Mod: ER

## 2021-09-16 PROCEDURE — 93010 EKG 12-LEAD: ICD-10-PCS | Mod: ,,, | Performed by: INTERNAL MEDICINE

## 2021-09-16 PROCEDURE — 93010 ELECTROCARDIOGRAM REPORT: CPT | Mod: ,,, | Performed by: INTERNAL MEDICINE

## 2021-09-16 PROCEDURE — 99283 EMERGENCY DEPT VISIT LOW MDM: CPT | Mod: 25,ER

## 2021-09-17 VITALS
DIASTOLIC BLOOD PRESSURE: 67 MMHG | RESPIRATION RATE: 20 BRPM | TEMPERATURE: 99 F | HEIGHT: 71 IN | HEART RATE: 73 BPM | OXYGEN SATURATION: 99 % | SYSTOLIC BLOOD PRESSURE: 102 MMHG | BODY MASS INDEX: 32.9 KG/M2 | WEIGHT: 235 LBS

## 2021-09-17 PROCEDURE — 25000003 PHARM REV CODE 250: Mod: ER | Performed by: EMERGENCY MEDICINE

## 2021-09-17 RX ORDER — DIAZEPAM 5 MG/1
5 TABLET ORAL
Status: COMPLETED | OUTPATIENT
Start: 2021-09-17 | End: 2021-09-17

## 2021-09-17 RX ADMIN — DIAZEPAM 5 MG: 5 TABLET ORAL at 01:09

## 2021-10-04 ENCOUNTER — PATIENT MESSAGE (OUTPATIENT)
Dept: ADMINISTRATIVE | Facility: HOSPITAL | Age: 36
End: 2021-10-04

## 2021-12-11 ENCOUNTER — HOSPITAL ENCOUNTER (EMERGENCY)
Facility: HOSPITAL | Age: 36
Discharge: HOME | End: 2021-12-11
Attending: EMERGENCY MEDICINE
Payer: MEDICAID

## 2021-12-11 VITALS
TEMPERATURE: 99 F | BODY MASS INDEX: 32.2 KG/M2 | DIASTOLIC BLOOD PRESSURE: 65 MMHG | HEART RATE: 79 BPM | RESPIRATION RATE: 16 BRPM | SYSTOLIC BLOOD PRESSURE: 113 MMHG | WEIGHT: 230 LBS | HEIGHT: 71 IN | OXYGEN SATURATION: 99 %

## 2021-12-11 DIAGNOSIS — K21.9 GASTROESOPHAGEAL REFLUX DISEASE, UNSPECIFIED WHETHER ESOPHAGITIS PRESENT: Primary | ICD-10-CM

## 2021-12-11 DIAGNOSIS — R07.9 CHEST PAIN: ICD-10-CM

## 2021-12-11 LAB
ALBUMIN SERPL-MCNC: 3.9 G/DL (ref 3.3–5.5)
ALP SERPL-CCNC: 64 U/L (ref 42–141)
BILIRUB SERPL-MCNC: 0.6 MG/DL (ref 0.2–1.6)
BUN SERPL-MCNC: 12 MG/DL (ref 7–22)
CALCIUM SERPL-MCNC: 9.4 MG/DL (ref 8–10.3)
CHLORIDE SERPL-SCNC: 102 MMOL/L (ref 98–108)
CREAT SERPL-MCNC: 1.2 MG/DL (ref 0.6–1.2)
GLUCOSE SERPL-MCNC: 161 MG/DL (ref 73–118)
POC ALT (SGPT): 22 U/L (ref 10–47)
POC AST (SGOT): 25 U/L (ref 11–38)
POC CARDIAC TROPONIN I: 0 NG/ML
POC TCO2: 29 MMOL/L (ref 18–33)
POTASSIUM BLD-SCNC: 4.1 MMOL/L (ref 3.6–5.1)
PROTEIN, POC: 7.4 G/DL (ref 6.4–8.1)
SAMPLE: NORMAL
SODIUM BLD-SCNC: 146 MMOL/L (ref 128–145)

## 2021-12-11 PROCEDURE — 80053 COMPREHEN METABOLIC PANEL: CPT | Mod: ER

## 2021-12-11 PROCEDURE — 85025 COMPLETE CBC W/AUTO DIFF WBC: CPT | Mod: ER

## 2021-12-11 PROCEDURE — 93010 ELECTROCARDIOGRAM REPORT: CPT | Mod: ,,, | Performed by: INTERNAL MEDICINE

## 2021-12-11 PROCEDURE — 93005 ELECTROCARDIOGRAM TRACING: CPT | Mod: ER

## 2021-12-11 PROCEDURE — 93010 EKG 12-LEAD: ICD-10-PCS | Mod: ,,, | Performed by: INTERNAL MEDICINE

## 2021-12-11 PROCEDURE — 84484 ASSAY OF TROPONIN QUANT: CPT | Mod: ER

## 2021-12-11 PROCEDURE — 99284 EMERGENCY DEPT VISIT MOD MDM: CPT | Mod: 25,ER

## 2021-12-11 RX ORDER — LANSOPRAZOLE 30 MG/1
30 CAPSULE, DELAYED RELEASE ORAL DAILY
Qty: 30 CAPSULE | Refills: 0 | Status: SHIPPED | OUTPATIENT
Start: 2021-12-11 | End: 2023-01-31 | Stop reason: ALTCHOICE

## 2022-11-25 ENCOUNTER — HOSPITAL ENCOUNTER (EMERGENCY)
Facility: HOSPITAL | Age: 37
Discharge: HOME | End: 2022-11-25
Attending: INTERNAL MEDICINE
Payer: MEDICAID

## 2022-11-25 VITALS
HEIGHT: 71 IN | DIASTOLIC BLOOD PRESSURE: 87 MMHG | TEMPERATURE: 99 F | RESPIRATION RATE: 20 BRPM | BODY MASS INDEX: 32.2 KG/M2 | OXYGEN SATURATION: 97 % | SYSTOLIC BLOOD PRESSURE: 150 MMHG | HEART RATE: 101 BPM | WEIGHT: 230 LBS

## 2022-11-25 DIAGNOSIS — T14.90XA INJURY: ICD-10-CM

## 2022-11-25 DIAGNOSIS — J06.9 ACUTE URI: Primary | ICD-10-CM

## 2022-11-25 DIAGNOSIS — B34.9 VIRAL SYNDROME: ICD-10-CM

## 2022-11-25 LAB
INFLUENZA A ANTIGEN, POC: NEGATIVE
INFLUENZA B ANTIGEN, POC: NEGATIVE

## 2022-11-25 PROCEDURE — 87804 INFLUENZA ASSAY W/OPTIC: CPT | Mod: 59,ER

## 2022-11-25 PROCEDURE — 99284 EMERGENCY DEPT VISIT MOD MDM: CPT | Mod: 25,ER

## 2022-11-25 PROCEDURE — 99284 EMERGENCY DEPT VISIT MOD MDM: CPT | Mod: 27,ER

## 2022-11-25 RX ORDER — FLUTICASONE PROPIONATE 50 MCG
2 SPRAY, SUSPENSION (ML) NASAL DAILY
Qty: 15 G | Refills: 0 | Status: SHIPPED | OUTPATIENT
Start: 2022-11-25 | End: 2023-01-31

## 2022-11-25 RX ORDER — IBUPROFEN 800 MG/1
800 TABLET ORAL EVERY 8 HOURS PRN
Qty: 30 TABLET | Refills: 0 | Status: SHIPPED | OUTPATIENT
Start: 2022-11-25 | End: 2023-01-31

## 2022-11-25 RX ORDER — AZELASTINE 1 MG/ML
2 SPRAY, METERED NASAL 2 TIMES DAILY
Qty: 30 ML | Refills: 0 | Status: SHIPPED | OUTPATIENT
Start: 2022-11-25 | End: 2023-01-31 | Stop reason: ALTCHOICE

## 2022-11-26 NOTE — ED PROVIDER NOTES
Encounter Date: 11/25/2022    SCRIBE #1 NOTE: I, Marianelaevangelina Lima, am scribing for, and in the presence of,  Elvin Lux MD. I have scribed the following portions of the note - Other sections scribed: HPI, ROS, PE.     History     Chief Complaint   Patient presents with    Back Pain     Pt c/o bilateral mid to lower back pain worse with coughing x 1 week    Foot Injury     Pt c/o L foot pain after being escorted to exam room 2     37 year old male with history of HLD and Gastric ulcer presents to the ED with complaints of right sided back pain beginning 1.5 weeks ago. Pt notes associated symptoms of productive cough with clear mucus and led sided back pain which began one day ago. Denies fever, rhinorrhea and sore throat. Pt notes the back pain is exacerbated when coughing. Pt notes taking cough syrup for cough PTA. Denies smoking. No known allergies.     The history is provided by the patient. No  was used.   Review of patient's allergies indicates:  No Known Allergies  Past Medical History:   Diagnosis Date    Gastric ulcer due to Helicobacter pylori     Gastric ulcer, acute     GERD (gastroesophageal reflux disease)     Hyperlipemia      Past Surgical History:   Procedure Laterality Date    HERNIA REPAIR       Family History   Problem Relation Age of Onset    Hypertension Mother     Diabetes Mellitus Mother     Kidney failure Mother     Hypertension Father     Diabetes Mellitus Father     Hypertension Sister      Social History     Tobacco Use    Smoking status: Former    Smokeless tobacco: Never   Substance Use Topics    Alcohol use: Yes     Comment: occasionally    Drug use: No     Review of Systems   Constitutional:  Negative for chills and fever.   HENT: Negative.  Negative for rhinorrhea and sore throat.    Eyes: Negative.    Respiratory:  Positive for cough (productive, clear mucus). Negative for shortness of breath.    Cardiovascular:  Negative for chest pain and palpitations.    Gastrointestinal:  Negative for nausea and vomiting.   Genitourinary:  Negative for dysuria and hematuria.   Musculoskeletal:  Positive for back pain.        Left foot pain   Skin: Negative.    Neurological:  Negative for dizziness and headaches.   Psychiatric/Behavioral: Negative.     All other systems reviewed and are negative.    Physical Exam     Initial Vitals [11/25/22 2059]   BP Pulse Resp Temp SpO2   (!) 150/87 101 20 98.8 °F (37.1 °C) 97 %      MAP       --         Physical Exam    Nursing note and vitals reviewed.  Constitutional: He appears well-developed and well-nourished.   HENT:   Head: Normocephalic and atraumatic.   Right Ear: External ear normal.   Left Ear: External ear normal.   Enlarged nasal turbinates noted. Clear nasal discharge noted. Oropharyngeal erythema present. No oropharyngeal exudate or edema.    Eyes: Conjunctivae and EOM are normal.   Neck:   Normal range of motion.  Cardiovascular:  Normal rate, regular rhythm and normal heart sounds.           Pulmonary/Chest: Breath sounds normal. No respiratory distress. He has no wheezes.   Abdominal: Abdomen is soft. Bowel sounds are normal. He exhibits no distension.   Musculoskeletal:         General: No tenderness or edema.      Cervical back: Normal range of motion.      Comments: Left foot pain upon movement/ambulation without tenderness to palpation or edema or erythema     Neurological: He is alert.   Skin: Skin is warm and dry. Capillary refill takes less than 2 seconds.   Psychiatric: He has a normal mood and affect.       ED Course   Procedures  Labs Reviewed   POCT INFLUENZA A/B MOLECULAR   POCT RAPID INFLUENZA A/B          Imaging Results              X-Ray Foot Complete Left (Final result)  Result time 11/25/22 22:41:24      Final result by Darek Jean Baptiste MD (11/25/22 22:41:24)                   Impression:      No acute osseous abnormality identified.      Electronically signed by: Darek Jean Baptiste  MD  Date:    11/25/2022  Time:    22:41               Narrative:    EXAMINATION:  XR FOOT COMPLETE 3 VIEW LEFT    CLINICAL HISTORY:  .  Injury, unspecified, initial encounter    TECHNIQUE:  AP, lateral and oblique views of the left foot were performed.    COMPARISON:  None    FINDINGS:  No evidence of acute displaced fracture, dislocation, or osseous destructive process.  Lisfranc articulation is congruent.  No radiopaque retained foreign body seen.                                       Medications - No data to display  Medical Decision Making:   Initial Assessment:   37 year old male with history of HLD and Gastric ulcer presents to the ED with complaints of right sided back pain beginning 1.5 weeks ago. Pt notes associated symptoms of productive cough with clear mucus and led sided back pain which began one day ago. Denies fever, rhinorrhea and sore throat. Pt notes the back pain is exacerbated when coughing. Pt notes taking cough syrup for cough PTA. Denies smoking. No known allergies.   Independently Interpreted Test(s):   I have ordered and independently interpreted X-rays - see prior notes.  Clinical Tests:   Radiological Study: Ordered and Reviewed  ED Management:  Evaluation today reveals no emergent disease.  Rapid flu is negative in the foot x-ray showed no acute abnormalities.  Patient was given instructions for musculoskeletal pain/viral syndrome and received prescriptions for Astelin/fluticasone/ ibuprofen.  He was advised to follow-up with his primary care physician within the next week for re-evaluation/return to the emergency department if condition worsens.        Scribe Attestation:   Scribe #1: I performed the above scribed service and the documentation accurately describes the services I performed. I attest to the accuracy of the note.                 This document was produced by a scribe under my direction and in my presence. I agree with the content of the note and have made any necessary  lizeth.     Dr. Lux    11/26/2022 1:07 AM    Clinical Impression:   Final diagnoses:  [T14.90XA] Injury  [J06.9] Acute URI (Primary)  [B34.9] Viral syndrome        ED Disposition Condition    Discharge Stable          ED Prescriptions       Medication Sig Dispense Start Date End Date Auth. Provider    azelastine (ASTELIN) 137 mcg (0.1 %) nasal spray 2 sprays (274 mcg total) by Nasal route 2 (two) times daily. 30 mL 11/25/2022 1/19/2023 Elvin Lux MD    fluticasone propionate (FLONASE) 50 mcg/actuation nasal spray 2 sprays (100 mcg total) by Each Nostril route once daily. 15 g 11/25/2022 -- Elvin Lux MD    ibuprofen (ADVIL,MOTRIN) 800 MG tablet Take 1 tablet (800 mg total) by mouth every 8 (eight) hours as needed for Pain. 30 tablet 11/25/2022 -- Elvin Lux MD          Follow-up Information       Follow up With Specialties Details Why Contact Info    Jarrett Celis MD Family Medicine Schedule an appointment as soon as possible for a visit in 1 week For reevaluation [address]  [place] [address]  [phone]               Elvin Lux MD  11/26/22 0109

## 2023-01-31 ENCOUNTER — HOSPITAL ENCOUNTER (OUTPATIENT)
Facility: HOSPITAL | Age: 38
Discharge: HOME | End: 2023-01-31
Attending: EMERGENCY MEDICINE | Admitting: HOSPITALIST
Payer: MEDICAID

## 2023-01-31 VITALS
HEIGHT: 68 IN | TEMPERATURE: 98 F | OXYGEN SATURATION: 98 % | SYSTOLIC BLOOD PRESSURE: 134 MMHG | HEART RATE: 71 BPM | WEIGHT: 242.06 LBS | BODY MASS INDEX: 36.69 KG/M2 | DIASTOLIC BLOOD PRESSURE: 81 MMHG | RESPIRATION RATE: 22 BRPM

## 2023-01-31 DIAGNOSIS — R00.2 PALPITATIONS: ICD-10-CM

## 2023-01-31 DIAGNOSIS — I49.9 ARRHYTHMIA: ICD-10-CM

## 2023-01-31 DIAGNOSIS — I48.91 A-FIB: ICD-10-CM

## 2023-01-31 DIAGNOSIS — I48.91 ATRIAL FIBRILLATION WITH RVR: Primary | ICD-10-CM

## 2023-01-31 PROBLEM — G47.33 OSA (OBSTRUCTIVE SLEEP APNEA): Status: ACTIVE | Noted: 2023-01-31

## 2023-01-31 LAB
ALBUMIN SERPL-MCNC: 3.7 G/DL (ref 3.3–5.5)
ALP SERPL-CCNC: 77 U/L (ref 42–141)
ANION GAP SERPL CALC-SCNC: 8 MMOL/L (ref 8–16)
ASCENDING AORTA: 3.23 CM
AV INDEX (PROSTH): 0.95
AV MEAN GRADIENT: 4 MMHG
AV PEAK GRADIENT: 7 MMHG
AV VALVE AREA: 4.18 CM2
AV VELOCITY RATIO: 0.9
BASOPHILS # BLD AUTO: 0.04 K/UL (ref 0–0.2)
BASOPHILS NFR BLD: 0.4 % (ref 0–1.9)
BILIRUB SERPL-MCNC: 0.6 MG/DL (ref 0.2–1.6)
BSA FOR ECHO PROCEDURE: 2.3 M2
BUN SERPL-MCNC: 10 MG/DL (ref 6–20)
BUN SERPL-MCNC: 13 MG/DL (ref 7–22)
CALCIUM SERPL-MCNC: 8.8 MG/DL (ref 8.7–10.5)
CALCIUM SERPL-MCNC: 9.6 MG/DL (ref 8–10.3)
CHLORIDE SERPL-SCNC: 106 MMOL/L (ref 95–110)
CHLORIDE SERPL-SCNC: 106 MMOL/L (ref 98–108)
CO2 SERPL-SCNC: 24 MMOL/L (ref 23–29)
CREAT SERPL-MCNC: 0.9 MG/DL (ref 0.5–1.4)
CREAT SERPL-MCNC: 1.1 MG/DL (ref 0.6–1.2)
CV ECHO LV RWT: 0.51 CM
DIFFERENTIAL METHOD: ABNORMAL
DOP CALC AO PEAK VEL: 1.34 M/S
DOP CALC AO VTI: 24.9 CM
DOP CALC LVOT AREA: 4.4 CM2
DOP CALC LVOT DIAMETER: 2.37 CM
DOP CALC LVOT PEAK VEL: 1.2 M/S
DOP CALC LVOT STROKE VOLUME: 104.06 CM3
DOP CALCLVOT PEAK VEL VTI: 23.6 CM
E WAVE DECELERATION TIME: 225.68 MSEC
E/A RATIO: 1.16
E/E' RATIO: 4.73 M/S
ECHO LV POSTERIOR WALL: 0.97 CM (ref 0.6–1.1)
EJECTION FRACTION: 55 %
EOSINOPHIL # BLD AUTO: 0.2 K/UL (ref 0–0.5)
EOSINOPHIL NFR BLD: 1.7 % (ref 0–8)
ERYTHROCYTE [DISTWIDTH] IN BLOOD BY AUTOMATED COUNT: 14.6 % (ref 11.5–14.5)
EST. GFR  (NO RACE VARIABLE): >60 ML/MIN/1.73 M^2
FRACTIONAL SHORTENING: 34 % (ref 28–44)
GLUCOSE SERPL-MCNC: 104 MG/DL (ref 73–118)
GLUCOSE SERPL-MCNC: 118 MG/DL (ref 70–110)
HCT VFR BLD AUTO: 42.2 % (ref 40–54)
HCT, POC: NORMAL
HGB BLD-MCNC: 13.6 G/DL (ref 14–18)
HGB, POC: NORMAL (ref 14–18)
IMM GRANULOCYTES # BLD AUTO: 0.02 K/UL (ref 0–0.04)
IMM GRANULOCYTES NFR BLD AUTO: 0.2 % (ref 0–0.5)
INTERVENTRICULAR SEPTUM: 0.97 CM (ref 0.6–1.1)
IVC DIAMETER: 1.37 CM
IVRT: 110.37 MSEC
LA MAJOR: 5.73 CM
LA MINOR: 5.22 CM
LA WIDTH: 4 CM
LEFT ATRIUM SIZE: 3.06 CM
LEFT ATRIUM VOLUME INDEX: 25.6 ML/M2
LEFT ATRIUM VOLUME: 56.84 CM3
LEFT INTERNAL DIMENSION IN SYSTOLE: 2.48 CM (ref 2.1–4)
LEFT VENTRICLE DIASTOLIC VOLUME INDEX: 27.33 ML/M2
LEFT VENTRICLE DIASTOLIC VOLUME: 60.68 ML
LEFT VENTRICLE MASS INDEX: 50 G/M2
LEFT VENTRICLE SYSTOLIC VOLUME INDEX: 9.9 ML/M2
LEFT VENTRICLE SYSTOLIC VOLUME: 21.95 ML
LEFT VENTRICULAR INTERNAL DIMENSION IN DIASTOLE: 3.77 CM (ref 3.5–6)
LEFT VENTRICULAR MASS: 110.91 G
LV LATERAL E/E' RATIO: 3.71 M/S
LV SEPTAL E/E' RATIO: 6.5 M/S
LVOT MG: 2.87 MMHG
LVOT MV: 0.79 CM/S
LYMPHOCYTES # BLD AUTO: 2.6 K/UL (ref 1–4.8)
LYMPHOCYTES NFR BLD: 27.3 % (ref 18–48)
MAGNESIUM SERPL-MCNC: 2.1 MG/DL (ref 1.6–2.6)
MCH RBC QN AUTO: 26.7 PG (ref 27–31)
MCH, POC: NORMAL
MCHC RBC AUTO-ENTMCNC: 32.2 G/DL (ref 32–36)
MCHC, POC: NORMAL
MCV RBC AUTO: 83 FL (ref 82–98)
MCV, POC: NORMAL
MONOCYTES # BLD AUTO: 0.7 K/UL (ref 0.3–1)
MONOCYTES NFR BLD: 7.6 % (ref 4–15)
MPV, POC: NORMAL
MV PEAK A VEL: 0.45 M/S
MV PEAK E VEL: 0.52 M/S
MV STENOSIS PRESSURE HALF TIME: 65.45 MS
MV VALVE AREA P 1/2 METHOD: 3.36 CM2
NEUTROPHILS # BLD AUTO: 6 K/UL (ref 1.8–7.7)
NEUTROPHILS NFR BLD: 62.8 % (ref 38–73)
NRBC BLD-RTO: 0 /100 WBC
PHOSPHATE SERPL-MCNC: 3.1 MG/DL (ref 2.7–4.5)
PISA TR MAX VEL: 1.89 M/S
PLATELET # BLD AUTO: 273 K/UL (ref 150–450)
PMV BLD AUTO: 11.2 FL (ref 9.2–12.9)
POC ALT (SGPT): 26 U/L (ref 10–47)
POC AST (SGOT): 25 U/L (ref 11–38)
POC CARDIAC TROPONIN I: 0 NG/ML (ref 0–0.08)
POC PLATELET COUNT: NORMAL
POC PTINR: 1.2 (ref 0.9–1.2)
POC PTWBT: 14.8 SEC (ref 9.7–14.3)
POC TCO2: 29 MMOL/L (ref 18–33)
POTASSIUM BLD-SCNC: 3.7 MMOL/L (ref 3.6–5.1)
POTASSIUM SERPL-SCNC: 4.3 MMOL/L (ref 3.5–5.1)
PROTEIN, POC: 7.9 G/DL (ref 6.4–8.1)
PV PEAK VELOCITY: 0.96 CM/S
RA MAJOR: 3.99 CM
RA PRESSURE: 3 MMHG
RBC # BLD AUTO: 5.09 M/UL (ref 4.6–6.2)
RBC, POC: NORMAL
RDW, POC: NORMAL
RIGHT VENTRICULAR END-DIASTOLIC DIMENSION: 3.67 CM
RV TISSUE DOPPLER FREE WALL SYSTOLIC VELOCITY 1 (APICAL 4 CHAMBER VIEW): 0.02 CM/S
SAMPLE: ABNORMAL
SAMPLE: NORMAL
SINUS: 3.36 CM
SODIUM BLD-SCNC: 138 MMOL/L (ref 128–145)
SODIUM SERPL-SCNC: 138 MMOL/L (ref 136–145)
STJ: 2.71 CM
TDI LATERAL: 0.14 M/S
TDI SEPTAL: 0.08 M/S
TDI: 0.11 M/S
TR MAX PG: 14 MMHG
TRICUSPID ANNULAR PLANE SYSTOLIC EXCURSION: 2.34 CM
TSH SERPL DL<=0.005 MIU/L-ACNC: 1.22 UIU/ML (ref 0.4–4)
TV PEAK GRADIENT: 2.92 MMHG
TV REST PULMONARY ARTERY PRESSURE: 17 MMHG
WBC # BLD AUTO: 9.48 K/UL (ref 3.9–12.7)
WBC, POC: NORMAL

## 2023-01-31 PROCEDURE — 96375 TX/PRO/DX INJ NEW DRUG ADDON: CPT

## 2023-01-31 PROCEDURE — 85025 COMPLETE CBC W/AUTO DIFF WBC: CPT | Performed by: STUDENT IN AN ORGANIZED HEALTH CARE EDUCATION/TRAINING PROGRAM

## 2023-01-31 PROCEDURE — 83735 ASSAY OF MAGNESIUM: CPT | Performed by: STUDENT IN AN ORGANIZED HEALTH CARE EDUCATION/TRAINING PROGRAM

## 2023-01-31 PROCEDURE — 25000003 PHARM REV CODE 250: Mod: ER | Performed by: EMERGENCY MEDICINE

## 2023-01-31 PROCEDURE — 63600175 PHARM REV CODE 636 W HCPCS: Mod: ER | Performed by: EMERGENCY MEDICINE

## 2023-01-31 PROCEDURE — 93005 ELECTROCARDIOGRAM TRACING: CPT | Mod: ER

## 2023-01-31 PROCEDURE — 25000003 PHARM REV CODE 250: Performed by: EMERGENCY MEDICINE

## 2023-01-31 PROCEDURE — 99291 PR CRITICAL CARE, E/M 30-74 MINUTES: ICD-10-PCS | Mod: 25,,, | Performed by: INTERNAL MEDICINE

## 2023-01-31 PROCEDURE — 82962 GLUCOSE BLOOD TEST: CPT | Mod: ER

## 2023-01-31 PROCEDURE — G0378 HOSPITAL OBSERVATION PER HR: HCPCS

## 2023-01-31 PROCEDURE — 25000003 PHARM REV CODE 250: Performed by: HOSPITALIST

## 2023-01-31 PROCEDURE — 96372 THER/PROPH/DIAG INJ SC/IM: CPT | Mod: 59 | Performed by: INTERNAL MEDICINE

## 2023-01-31 PROCEDURE — 96374 THER/PROPH/DIAG INJ IV PUSH: CPT | Mod: ER

## 2023-01-31 PROCEDURE — 96361 HYDRATE IV INFUSION ADD-ON: CPT | Mod: ER

## 2023-01-31 PROCEDURE — 96366 THER/PROPH/DIAG IV INF ADDON: CPT | Mod: ER

## 2023-01-31 PROCEDURE — 93010 ELECTROCARDIOGRAM REPORT: CPT | Mod: ,,, | Performed by: INTERNAL MEDICINE

## 2023-01-31 PROCEDURE — 25000003 PHARM REV CODE 250: Performed by: INTERNAL MEDICINE

## 2023-01-31 PROCEDURE — 96375 TX/PRO/DX INJ NEW DRUG ADDON: CPT | Mod: ER,59

## 2023-01-31 PROCEDURE — 99291 CRITICAL CARE FIRST HOUR: CPT | Mod: 25,ER

## 2023-01-31 PROCEDURE — 80053 COMPREHEN METABOLIC PANEL: CPT | Mod: ER

## 2023-01-31 PROCEDURE — 96366 THER/PROPH/DIAG IV INF ADDON: CPT

## 2023-01-31 PROCEDURE — 84484 ASSAY OF TROPONIN QUANT: CPT | Mod: ER

## 2023-01-31 PROCEDURE — 84100 ASSAY OF PHOSPHORUS: CPT | Performed by: STUDENT IN AN ORGANIZED HEALTH CARE EDUCATION/TRAINING PROGRAM

## 2023-01-31 PROCEDURE — 96365 THER/PROPH/DIAG IV INF INIT: CPT | Mod: ER,59

## 2023-01-31 PROCEDURE — 84443 ASSAY THYROID STIM HORMONE: CPT | Performed by: EMERGENCY MEDICINE

## 2023-01-31 PROCEDURE — 36415 COLL VENOUS BLD VENIPUNCTURE: CPT | Performed by: STUDENT IN AN ORGANIZED HEALTH CARE EDUCATION/TRAINING PROGRAM

## 2023-01-31 PROCEDURE — 20000000 HC ICU ROOM

## 2023-01-31 PROCEDURE — 85610 PROTHROMBIN TIME: CPT | Mod: ER

## 2023-01-31 PROCEDURE — 25000003 PHARM REV CODE 250: Performed by: STUDENT IN AN ORGANIZED HEALTH CARE EDUCATION/TRAINING PROGRAM

## 2023-01-31 PROCEDURE — 93010 EKG 12-LEAD: ICD-10-PCS | Mod: ,,, | Performed by: INTERNAL MEDICINE

## 2023-01-31 PROCEDURE — 99291 CRITICAL CARE FIRST HOUR: CPT | Mod: 25,,, | Performed by: INTERNAL MEDICINE

## 2023-01-31 PROCEDURE — 85025 COMPLETE CBC W/AUTO DIFF WBC: CPT | Mod: ER

## 2023-01-31 PROCEDURE — 80048 BASIC METABOLIC PNL TOTAL CA: CPT | Mod: XB | Performed by: STUDENT IN AN ORGANIZED HEALTH CARE EDUCATION/TRAINING PROGRAM

## 2023-01-31 PROCEDURE — 63600175 PHARM REV CODE 636 W HCPCS: Performed by: INTERNAL MEDICINE

## 2023-01-31 PROCEDURE — 96372 THER/PROPH/DIAG INJ SC/IM: CPT | Mod: 59 | Performed by: EMERGENCY MEDICINE

## 2023-01-31 RX ORDER — MUPIROCIN 20 MG/G
OINTMENT TOPICAL 2 TIMES DAILY
Status: DISCONTINUED | OUTPATIENT
Start: 2023-01-31 | End: 2023-01-31 | Stop reason: HOSPADM

## 2023-01-31 RX ORDER — ENOXAPARIN SODIUM 100 MG/ML
1 INJECTION SUBCUTANEOUS
Status: DISCONTINUED | OUTPATIENT
Start: 2023-01-31 | End: 2023-01-31 | Stop reason: HOSPADM

## 2023-01-31 RX ORDER — DILTIAZEM HYDROCHLORIDE 5 MG/ML
5 INJECTION INTRAVENOUS
Status: COMPLETED | OUTPATIENT
Start: 2023-01-31 | End: 2023-01-31

## 2023-01-31 RX ORDER — ACETAMINOPHEN 325 MG/1
650 TABLET ORAL
Status: COMPLETED | OUTPATIENT
Start: 2023-01-31 | End: 2023-01-31

## 2023-01-31 RX ORDER — FAMOTIDINE 10 MG/ML
20 INJECTION INTRAVENOUS DAILY
Status: DISCONTINUED | OUTPATIENT
Start: 2023-01-31 | End: 2023-01-31 | Stop reason: HOSPADM

## 2023-01-31 RX ORDER — PROCHLORPERAZINE EDISYLATE 5 MG/ML
5 INJECTION INTRAMUSCULAR; INTRAVENOUS EVERY 6 HOURS PRN
Status: DISCONTINUED | OUTPATIENT
Start: 2023-01-31 | End: 2023-01-31 | Stop reason: HOSPADM

## 2023-01-31 RX ORDER — SODIUM CHLORIDE 0.9 % (FLUSH) 0.9 %
10 SYRINGE (ML) INJECTION
Status: DISCONTINUED | OUTPATIENT
Start: 2023-01-31 | End: 2023-01-31 | Stop reason: HOSPADM

## 2023-01-31 RX ORDER — ASPIRIN 325 MG
325 TABLET ORAL
Status: COMPLETED | OUTPATIENT
Start: 2023-01-31 | End: 2023-01-31

## 2023-01-31 RX ORDER — LANOLIN ALCOHOL/MO/W.PET/CERES
800 CREAM (GRAM) TOPICAL
Status: DISCONTINUED | OUTPATIENT
Start: 2023-01-31 | End: 2023-01-31 | Stop reason: HOSPADM

## 2023-01-31 RX ORDER — SODIUM,POTASSIUM PHOSPHATES 280-250MG
2 POWDER IN PACKET (EA) ORAL
Status: DISCONTINUED | OUTPATIENT
Start: 2023-01-31 | End: 2023-01-31 | Stop reason: HOSPADM

## 2023-01-31 RX ORDER — DILTIAZEM HCL 1 MG/ML
0-15 INJECTION, SOLUTION INTRAVENOUS CONTINUOUS
Status: DISCONTINUED | OUTPATIENT
Start: 2023-01-31 | End: 2023-01-31 | Stop reason: HOSPADM

## 2023-01-31 RX ORDER — DILTIAZEM HYDROCHLORIDE 5 MG/ML
10 INJECTION INTRAVENOUS
Status: DISCONTINUED | OUTPATIENT
Start: 2023-01-31 | End: 2023-01-31 | Stop reason: DRUGHIGH

## 2023-01-31 RX ORDER — ONDANSETRON 2 MG/ML
4 INJECTION INTRAMUSCULAR; INTRAVENOUS EVERY 8 HOURS PRN
Status: DISCONTINUED | OUTPATIENT
Start: 2023-01-31 | End: 2023-01-31 | Stop reason: HOSPADM

## 2023-01-31 RX ORDER — ENOXAPARIN SODIUM 100 MG/ML
100 INJECTION SUBCUTANEOUS
Status: COMPLETED | OUTPATIENT
Start: 2023-01-31 | End: 2023-01-31

## 2023-01-31 RX ORDER — TALC
6 POWDER (GRAM) TOPICAL NIGHTLY PRN
Status: DISCONTINUED | OUTPATIENT
Start: 2023-01-31 | End: 2023-01-31 | Stop reason: HOSPADM

## 2023-01-31 RX ORDER — ACETAMINOPHEN 325 MG/1
650 TABLET ORAL EVERY 4 HOURS PRN
Status: DISCONTINUED | OUTPATIENT
Start: 2023-01-31 | End: 2023-01-31 | Stop reason: HOSPADM

## 2023-01-31 RX ORDER — METOPROLOL SUCCINATE 50 MG/1
50 TABLET, EXTENDED RELEASE ORAL 2 TIMES DAILY
Status: DISCONTINUED | OUTPATIENT
Start: 2023-01-31 | End: 2023-01-31 | Stop reason: HOSPADM

## 2023-01-31 RX ORDER — NAPROXEN SODIUM 220 MG/1
81 TABLET, FILM COATED ORAL DAILY
Refills: 0
Start: 2023-01-31 | End: 2024-01-31

## 2023-01-31 RX ORDER — IPRATROPIUM BROMIDE AND ALBUTEROL SULFATE 2.5; .5 MG/3ML; MG/3ML
3 SOLUTION RESPIRATORY (INHALATION) EVERY 4 HOURS PRN
Status: DISCONTINUED | OUTPATIENT
Start: 2023-01-31 | End: 2023-01-31 | Stop reason: HOSPADM

## 2023-01-31 RX ORDER — METOPROLOL SUCCINATE 25 MG/1
25 TABLET, EXTENDED RELEASE ORAL DAILY
Qty: 30 TABLET | Refills: 11 | Status: SHIPPED | OUTPATIENT
Start: 2023-01-31 | End: 2024-01-31

## 2023-01-31 RX ADMIN — LORAZEPAM 0.5 MG: 2 INJECTION INTRAMUSCULAR; INTRAVENOUS at 02:01

## 2023-01-31 RX ADMIN — MUPIROCIN: 20 OINTMENT TOPICAL at 10:01

## 2023-01-31 RX ADMIN — ASPIRIN 325 MG ORAL TABLET 325 MG: 325 PILL ORAL at 02:01

## 2023-01-31 RX ADMIN — FAMOTIDINE 20 MG: 10 INJECTION INTRAVENOUS at 08:01

## 2023-01-31 RX ADMIN — DILTIAZEM HYDROCHLORIDE 5 MG: 5 INJECTION INTRAVENOUS at 01:01

## 2023-01-31 RX ADMIN — SODIUM CHLORIDE 1000 ML: 9 INJECTION, SOLUTION INTRAVENOUS at 01:01

## 2023-01-31 RX ADMIN — ENOXAPARIN SODIUM 110 MG: 100 INJECTION SUBCUTANEOUS at 10:01

## 2023-01-31 RX ADMIN — DILTIAZEM HYDROCHLORIDE 15 MG/HR: 5 INJECTION, SOLUTION INTRAVENOUS at 04:01

## 2023-01-31 RX ADMIN — ACETAMINOPHEN 650 MG: 325 TABLET ORAL at 02:01

## 2023-01-31 RX ADMIN — ENOXAPARIN SODIUM 100 MG: 100 INJECTION SUBCUTANEOUS at 02:01

## 2023-01-31 RX ADMIN — METOPROLOL SUCCINATE 50 MG: 50 TABLET, EXTENDED RELEASE ORAL at 10:01

## 2023-01-31 RX ADMIN — DILTIAZEM HYDROCHLORIDE 5 MG/HR: 5 INJECTION, SOLUTION INTRAVENOUS at 01:01

## 2023-01-31 NOTE — HPI
This is a 37 year old male with a PMHx of HLD, PERLA on CPAP who presents with palpitations.     Patient presented to the ED with palpitations and mild chest pressure that started the day of presentation. It occured suddently and without a precipitating event. He denies having any other symptoms. He reports that he had similar prior episodes but they were brief and resolved spontaneously. In the ED, he was tachycardic (up to 170), and was transiently hypotensive. Labs were unremarkable. EKG showed afib with RVR. He was given diltiazem 5 mg IV x 2, ativan 0.5 mg IV, 1L of NS, aspirin 325 mg and was started on a diltiazem ggt. The patient was admitted to the ICU for further management.

## 2023-01-31 NOTE — SUBJECTIVE & OBJECTIVE
Past Medical History:   Diagnosis Date    Gastric ulcer due to Helicobacter pylori     Gastric ulcer, acute     GERD (gastroesophageal reflux disease)     Hyperlipemia        Past Surgical History:   Procedure Laterality Date    HERNIA REPAIR         Review of patient's allergies indicates:  No Known Allergies    No current facility-administered medications on file prior to encounter.     Current Outpatient Medications on File Prior to Encounter   Medication Sig    [DISCONTINUED] aspirin 81 MG Chew Take 1 tablet (81 mg total) by mouth once daily. (Patient not taking: Reported on 1/31/2023)    [DISCONTINUED] azelastine (ASTELIN) 137 mcg (0.1 %) nasal spray 2 sprays (274 mcg total) by Nasal route 2 (two) times daily. (Patient not taking: Reported on 1/31/2023)    [DISCONTINUED] famotidine (PEPCID) 20 MG tablet Take 1 tablet (20 mg total) by mouth 2 (two) times daily. for 14 days (Patient not taking: Reported on 1/31/2023)    [DISCONTINUED] fluticasone propionate (FLONASE) 50 mcg/actuation nasal spray 2 sprays (100 mcg total) by Each Nostril route once daily.    [DISCONTINUED] ibuprofen (ADVIL,MOTRIN) 800 MG tablet Take 1 tablet (800 mg total) by mouth every 8 (eight) hours as needed for Pain.    [DISCONTINUED] lansoprazole (PREVACID) 30 MG capsule Take 1 capsule (30 mg total) by mouth once daily. (Patient not taking: Reported on 1/31/2023)    [DISCONTINUED] loratadine (CLARITIN) 10 mg tablet Take 1 tablet (10 mg total) by mouth every morning.    [DISCONTINUED] pantoprazole (PROTONIX) 20 MG tablet Take 1 tablet (20 mg total) by mouth once daily. (Patient not taking: Reported on 1/31/2023)     Family History       Problem Relation (Age of Onset)    Diabetes Mellitus Mother, Father    Hypertension Mother, Father, Sister    Kidney failure Mother          Tobacco Use    Smoking status: Former    Smokeless tobacco: Never   Substance and Sexual Activity    Alcohol use: Yes     Comment: occasionally    Drug use: Yes      Types: Marijuana    Sexual activity: Yes     Partners: Female     Birth control/protection: None     Review of Systems   Constitutional: Negative for diaphoresis and malaise/fatigue.   HENT:  Negative for congestion, hearing loss, hoarse voice, nosebleeds, odynophagia, stridor and tinnitus.    Eyes:  Negative for blurred vision, double vision, photophobia, visual disturbance and visual halos.   Cardiovascular:  Positive for irregular heartbeat and palpitations. Negative for chest pain, dyspnea on exertion, leg swelling, near-syncope, orthopnea, paroxysmal nocturnal dyspnea and syncope.   Respiratory:  Negative for shortness of breath, sputum production and wheezing.    Musculoskeletal:  Negative for falls, joint pain, muscle cramps, muscle weakness, myalgias, neck pain and stiffness.   Gastrointestinal:  Negative for abdominal pain, heartburn, hematemesis and melena.   Neurological:  Negative for disturbances in coordination, dizziness, focal weakness, headaches, light-headedness, loss of balance, numbness, paresthesias, seizures, sensory change, tremors, vertigo and weakness.   Psychiatric/Behavioral:  Negative for altered mental status, depression, hallucinations and memory loss. The patient does not have insomnia and is not nervous/anxious.    Objective:     Vital Signs (Most Recent):  Temp: 97.5 °F (36.4 °C) (01/31/23 0715)  Pulse: (!) 111 (01/31/23 0700)  Resp: (!) 28 (01/31/23 0700)  BP: 135/78 (01/31/23 0700)  SpO2: 98 % (01/31/23 0700)   Vital Signs (24h Range):  Temp:  [97.5 °F (36.4 °C)-98.5 °F (36.9 °C)] 97.5 °F (36.4 °C)  Pulse:  [] 111  Resp:  [12-28] 28  SpO2:  [91 %-100 %] 98 %  BP: ()/(26-89) 135/78     Weight: 109.8 kg (242 lb 1 oz)  Body mass index is 36.81 kg/m².    SpO2: 98 %         Intake/Output Summary (Last 24 hours) at 1/31/2023 0902  Last data filed at 1/31/2023 0600  Gross per 24 hour   Intake 1053.2 ml   Output 600 ml   Net 453.2 ml       Lines/Drains/Airways       Peripheral  Intravenous Line  Duration                  Peripheral IV - Single Lumen 01/31/23 0133 18 G Posterior;Right Hand <1 day         Peripheral IV - Single Lumen 01/31/23 0149 20 G Anterior;Right Forearm <1 day                    Physical Exam  Vitals reviewed.   Constitutional:       General: He is not in acute distress.     Appearance: He is overweight. He is not diaphoretic.   Neck:      Vascular: No carotid bruit or JVD.   Cardiovascular:      Rate and Rhythm: Normal rate. Rhythm irregularly irregular.      Pulses: Normal pulses.   Pulmonary:      Effort: Pulmonary effort is normal.      Breath sounds: Normal breath sounds.   Abdominal:      General: Bowel sounds are normal.      Palpations: Abdomen is soft.      Tenderness: There is no abdominal tenderness.   Musculoskeletal:      Right lower leg: No edema.      Left lower leg: No edema.   Neurological:      Mental Status: He is alert and oriented to person, place, and time.   Psychiatric:         Speech: Speech normal.         Behavior: Behavior normal.       Significant Labs: CMP   Recent Labs   Lab 01/31/23  0524      K 4.3      CO2 24   *   BUN 10   CREATININE 0.9   CALCIUM 8.8   ANIONGAP 8   , CBC   Recent Labs   Lab 01/31/23  0524   WBC 9.48   HGB 13.6*   HCT 42.2      , and Troponin No results for input(s): TROPONINI in the last 48 hours.    Significant Imaging: Echocardiogram: Transthoracic echo (TTE) complete (Cupid Only):   Results for orders placed or performed during the hospital encounter of 01/31/23   Echo   Result Value Ref Range    BSA 2.3 m2    TDI SEPTAL 0.08 m/s    LV LATERAL E/E' RATIO 3.71 m/s    LV SEPTAL E/E' RATIO 6.50 m/s    IVC diameter 1.37 cm    Left Ventricular Outflow Tract Mean Velocity 0.79 cm/s    Left Ventricular Outflow Tract Mean Gradient 2.87 mmHg    TDI LATERAL 0.14 m/s    PV PEAK VELOCITY 0.96 cm/s    LVIDd 3.77 3.5 - 6.0 cm    IVS 0.97 0.6 - 1.1 cm    Posterior Wall 0.97 0.6 - 1.1 cm    LVIDs 2.48 2.1  - 4.0 cm    FS 34 28 - 44 %    Sinus 3.36 cm    STJ 2.71 cm    Ascending aorta 3.23 cm    LV mass 110.91 g    LA size 3.06 cm    RVDD 3.67 cm    TAPSE 2.34 cm    RV S' 0.02 cm/s    Left Ventricle Relative Wall Thickness 0.51 cm    AV mean gradient 4 mmHg    AV valve area 4.18 cm2    AV Velocity Ratio 0.90     AV index (prosthetic) 0.95     MV valve area p 1/2 method 3.36 cm2    E/A ratio 1.16     Mean e' 0.11 m/s    E wave deceleration time 225.68 msec    IVRT 110.37 msec    LVOT diameter 2.37 cm    LVOT area 4.4 cm2    LVOT peak avery 1.20 m/s    LVOT peak VTI 23.60 cm    Ao peak avery 1.34 m/s    Ao VTI 24.9 cm    LVOT stroke volume 104.06 cm3    AV peak gradient 7 mmHg    TV peak gradient 2.92 mmHg    E/E' ratio 4.73 m/s    MV Peak E Avery 0.52 m/s    TR Max Avery 1.89 m/s    MV stenosis pressure 1/2 time 65.45 ms    MV Peak A Avery 0.45 m/s    LV Systolic Volume 21.95 mL    LV Systolic Volume Index 9.9 mL/m2    LV Diastolic Volume 60.68 mL    LV Diastolic Volume Index 27.33 mL/m2    LV Mass Index 50 g/m2    RA Major Axis 3.99 cm    Left Atrium Minor Axis 5.22 cm    Left Atrium Major Axis 5.73 cm    Triscuspid Valve Regurgitation Peak Gradient 14 mmHg    LA WIDTH 4.00 cm    LA volume 56.84 cm3    LA Volume Index 25.6 mL/m2    Right Atrial Pressure (from IVC) 3 mmHg    EF 55 %    TV rest pulmonary artery pressure 17 mmHg    Narrative    · Atrial fibrillation observed.  · The left ventricle is normal in size with concentric remodeling and  · The estimated ejection fraction is 55%.  · Normal right ventricular size with normal right ventricular systolic   function.  · Normal central venous pressure (3 mmHg).  · The estimated PA systolic pressure is 17 mmHg.  · There is no pulmonary hypertension.

## 2023-01-31 NOTE — PLAN OF CARE
[facility] - Intensive Care  Discharge Final Note    Primary Care Provider: Jarrett Celis MD (Inactive)    Expected Discharge Date: 1/31/2023    Final Discharge Note (most recent)       Final Note - 01/31/23 1424          Final Note    Assessment Type Final Discharge Note     Anticipated Discharge Disposition Home or Self Care     Hospital Resources/Appts/Education Provided Appointments scheduled and added to AVS        Post-Acute Status    Post-Acute Authorization Other     Other Status No Post-Acute Service Needs     Discharge Delays None known at this time                     Important Message from Medicare             Contact Info       Dr Roa        Next Steps: Schedule an appointment as soon as possible for a visit in 1 week(s)    Instructions: for Hospital Follow up

## 2023-01-31 NOTE — PLAN OF CARE
[facility] - Intensive Care  Initial Discharge Assessment       Primary Care Provider: Jarrett Celis MD (Inactive)    Admission Diagnosis: Palpitations [R00.2]  Arrhythmia [I49.9]  A-fib [I48.91]  Atrial fibrillation with RVR [I48.91]    Admission Date: 1/31/2023  Expected Discharge Date: 1/31/2023    Discharge Barriers Identified: None    Payor: MEDICAID / Plan: [employer] [facility] / Product Type: Managed Medicaid /     Extended Emergency Contact Information  Primary Emergency Contact: Mandy Alfaro  Address: [address]           [place] [address] [facility]  Home Phone: [phone]  Mobile Phone: [phone]  Relation: Mother    Discharge Plan A: Home with family         [id number] - [place] - [address] AT [facility]  [address]  [place] [address]  Phone: [phone] Fax: [phone]    [id number] - [place] - [address]  [address]  [place] [address]  Phone: [phone] Fax: [phone]    [id number] - [place] - [address]  [address]  [place] [address]  Phone: [phone] Fax: [phone]      Initial Assessment (most recent)       Adult Discharge Assessment - 01/31/23 1014          Discharge Assessment    Assessment Type Discharge Planning Assessment     Confirmed/corrected address, phone number and insurance Yes     Confirmed Demographics Correct on Facesheet     Source of Information patient     When was your last doctors appointment? --   8 months ago    Communicated KONG with patient/caregiver Yes     Reason For Admission AFIB     People in Home parent(s)     Do you expect to return to your current living situation? Yes     Do you have help at home or someone to help you manage your care at home? Yes     Prior to hospitilization cognitive status: Alert/Oriented     Current cognitive status: Alert/Oriented     Home Layout Able to live on 1st floor      Readmission within 30 days? No     Patient currently being followed by outpatient case management? No     Do you currently have service(s) that help you manage your care at home? No     Do you take prescription medications? Yes     Do you have prescription coverage? Yes     Coverage Medicaid     Do you have any problems affording any of your prescribed medications? No     Is the patient taking medications as prescribed? yes     Who is going to help you get home at discharge? MOther     How do you get to doctors appointments? car, drives self     Are you on dialysis? No     Do you take coumadin? No     Discharge Plan A Home with family     DME Needed Upon Discharge  none     Discharge Plan discussed with: Patient     Discharge Barriers Identified None        OTHER    Name(s) of People in Home Mother

## 2023-01-31 NOTE — CONSULTS
[facility] - Intensive Care  Cardiology  Consult Note    Patient Name: Cristo Ruff  MRN: [id number]   Admission Date: 1/31/2023  Hospital Length of Stay: 0 days  Code Status: Full Code   Attending Provider: Rohit Moss MD   Consulting Provider: Jonathan Velasquez MD  Primary Care Physician: Jarrett Celis MD (Inactive)  Principal Problem:Atrial fibrillation with rapid ventricular response    Patient information was obtained from patient, past medical records and ER records.     Inpatient consult to Cardiology  Consult performed by: Jonathan Velasquez MD  Consult ordered by: Fredy Simons MD        Subjective:     Chief Complaint:  Palpitations     HPI:   Cristo Ruff is a 38yo male presents with palpitations.  He is had similar episodes over a number of years but they usually resolve.  In the emergency room he was tachycardic.  Hypotensive.  EKG showed atrial fibrillation rapid ventricular response.  He was given diltiazem IV.  Aspirin.  Admitted to ICU.  Echocardiogram shows normal left ventricular systolic function.    Previously seen by Dr. Cedillo in 2020.    Echocardiogram 01/31/2023:     Atrial fibrillation observed.   The left ventricle is normal in size with concentric remodeling and   The estimated ejection fraction is 55%.   Normal right ventricular size with normal right ventricular systolic function.   Normal central venous pressure (3 mmHg).   The estimated PA systolic pressure is 17 mmHg.   There is no pulmonary hypertension.    Stress echocardiogram 08/10/2020:     Normal left ventricular systolic function. The estimated ejection fraction is 55%.   Normal LV diastolic function.   Normal right ventricular systolic function.   The estimated PA systolic pressure is 30 mmHg.   There were no arrhythmias during stress.   The ECG portion of this study is negative for myocardial ischemia.   The stress echo portion of this study is negative for myocardial ischemia.         Past  Medical History:   Diagnosis Date    Gastric ulcer due to Helicobacter pylori     Gastric ulcer, acute     GERD (gastroesophageal reflux disease)     Hyperlipemia        Past Surgical History:   Procedure Laterality Date    HERNIA REPAIR         Review of patient's allergies indicates:  No Known Allergies    No current facility-administered medications on file prior to encounter.     Current Outpatient Medications on File Prior to Encounter   Medication Sig    [DISCONTINUED] aspirin 81 MG Chew Take 1 tablet (81 mg total) by mouth once daily. (Patient not taking: Reported on 1/31/2023)    [DISCONTINUED] azelastine (ASTELIN) 137 mcg (0.1 %) nasal spray 2 sprays (274 mcg total) by Nasal route 2 (two) times daily. (Patient not taking: Reported on 1/31/2023)    [DISCONTINUED] famotidine (PEPCID) 20 MG tablet Take 1 tablet (20 mg total) by mouth 2 (two) times daily. for 14 days (Patient not taking: Reported on 1/31/2023)    [DISCONTINUED] fluticasone propionate (FLONASE) 50 mcg/actuation nasal spray 2 sprays (100 mcg total) by Each Nostril route once daily.    [DISCONTINUED] ibuprofen (ADVIL,MOTRIN) 800 MG tablet Take 1 tablet (800 mg total) by mouth every 8 (eight) hours as needed for Pain.    [DISCONTINUED] lansoprazole (PREVACID) 30 MG capsule Take 1 capsule (30 mg total) by mouth once daily. (Patient not taking: Reported on 1/31/2023)    [DISCONTINUED] loratadine (CLARITIN) 10 mg tablet Take 1 tablet (10 mg total) by mouth every morning.    [DISCONTINUED] pantoprazole (PROTONIX) 20 MG tablet Take 1 tablet (20 mg total) by mouth once daily. (Patient not taking: Reported on 1/31/2023)     Family History       Problem Relation (Age of Onset)    Diabetes Mellitus Mother, Father    Hypertension Mother, Father, Sister    Kidney failure Mother          Tobacco Use    Smoking status: Former    Smokeless tobacco: Never   Substance and Sexual Activity    Alcohol use: Yes     Comment: occasionally    Drug use:  Yes     Types: Marijuana    Sexual activity: Yes     Partners: Female     Birth control/protection: None     Review of Systems   Constitutional: Negative for diaphoresis and malaise/fatigue.   HENT:  Negative for congestion, hearing loss, hoarse voice, nosebleeds, odynophagia, stridor and tinnitus.    Eyes:  Negative for blurred vision, double vision, photophobia, visual disturbance and visual halos.   Cardiovascular:  Positive for irregular heartbeat and palpitations. Negative for chest pain, dyspnea on exertion, leg swelling, near-syncope, orthopnea, paroxysmal nocturnal dyspnea and syncope.   Respiratory:  Negative for shortness of breath, sputum production and wheezing.    Musculoskeletal:  Negative for falls, joint pain, muscle cramps, muscle weakness, myalgias, neck pain and stiffness.   Gastrointestinal:  Negative for abdominal pain, heartburn, hematemesis and melena.   Neurological:  Negative for disturbances in coordination, dizziness, focal weakness, headaches, light-headedness, loss of balance, numbness, paresthesias, seizures, sensory change, tremors, vertigo and weakness.   Psychiatric/Behavioral:  Negative for altered mental status, depression, hallucinations and memory loss. The patient does not have insomnia and is not nervous/anxious.    Objective:     Vital Signs (Most Recent):  Temp: 97.5 °F (36.4 °C) (01/31/23 0715)  Pulse: (!) 111 (01/31/23 0700)  Resp: (!) 28 (01/31/23 0700)  BP: 135/78 (01/31/23 0700)  SpO2: 98 % (01/31/23 0700)   Vital Signs (24h Range):  Temp:  [97.5 °F (36.4 °C)-98.5 °F (36.9 °C)] 97.5 °F (36.4 °C)  Pulse:  [] 111  Resp:  [12-28] 28  SpO2:  [91 %-100 %] 98 %  BP: ()/(26-89) 135/78     Weight: 109.8 kg (242 lb 1 oz)  Body mass index is 36.81 kg/m².    SpO2: 98 %         Intake/Output Summary (Last 24 hours) at 1/31/2023 0902  Last data filed at 1/31/2023 0600  Gross per 24 hour   Intake 1053.2 ml   Output 600 ml   Net 453.2 ml       Lines/Drains/Airways        Peripheral Intravenous Line  Duration                  Peripheral IV - Single Lumen 01/31/23 0133 18 G Posterior;Right Hand <1 day         Peripheral IV - Single Lumen 01/31/23 0149 20 G Anterior;Right Forearm <1 day                    Physical Exam  Vitals reviewed.   Constitutional:       General: He is not in acute distress.     Appearance: He is overweight. He is not diaphoretic.   Neck:      Vascular: No carotid bruit or JVD.   Cardiovascular:      Rate and Rhythm: Normal rate. Rhythm irregularly irregular.      Pulses: Normal pulses.   Pulmonary:      Effort: Pulmonary effort is normal.      Breath sounds: Normal breath sounds.   Abdominal:      General: Bowel sounds are normal.      Palpations: Abdomen is soft.      Tenderness: There is no abdominal tenderness.   Musculoskeletal:      Right lower leg: No edema.      Left lower leg: No edema.   Neurological:      Mental Status: He is alert and oriented to person, place, and time.   Psychiatric:         Speech: Speech normal.         Behavior: Behavior normal.       Significant Labs: CMP   Recent Labs   Lab 01/31/23  0524      K 4.3      CO2 24   *   BUN 10   CREATININE 0.9   CALCIUM 8.8   ANIONGAP 8   , CBC   Recent Labs   Lab 01/31/23  0524   WBC 9.48   HGB 13.6*   HCT 42.2      , and Troponin No results for input(s): TROPONINI in the last 48 hours.    Significant Imaging: Echocardiogram: Transthoracic echo (TTE) complete ([facility] Only):   Results for orders placed or performed during the hospital encounter of 01/31/23   Echo   Result Value Ref Range    BSA 2.3 m2    TDI SEPTAL 0.08 m/s    LV LATERAL E/E' RATIO 3.71 m/s    LV SEPTAL E/E' RATIO 6.50 m/s    IVC diameter 1.37 cm    Left Ventricular Outflow Tract Mean Velocity 0.79 cm/s    Left Ventricular Outflow Tract Mean Gradient 2.87 mmHg    TDI LATERAL 0.14 m/s    PV PEAK VELOCITY 0.96 cm/s    LVIDd 3.77 3.5 - 6.0 cm    IVS 0.97 0.6 - 1.1 cm    Posterior Wall 0.97 0.6 - 1.1 cm     LVIDs 2.48 2.1 - 4.0 cm    FS 34 28 - 44 %    Sinus 3.36 cm    STJ 2.71 cm    Ascending aorta 3.23 cm    LV mass 110.91 g    LA size 3.06 cm    RVDD 3.67 cm    TAPSE 2.34 cm    RV S' 0.02 cm/s    Left Ventricle Relative Wall Thickness 0.51 cm    AV mean gradient 4 mmHg    AV valve area 4.18 cm2    AV Velocity Ratio 0.90     AV index (prosthetic) 0.95     MV valve area p 1/2 method 3.36 cm2    E/A ratio 1.16     Mean e' 0.11 m/s    E wave deceleration time 225.68 msec    IVRT 110.37 msec    LVOT diameter 2.37 cm    LVOT area 4.4 cm2    LVOT peak avery 1.20 m/s    LVOT peak VTI 23.60 cm    Ao peak avery 1.34 m/s    Ao VTI 24.9 cm    LVOT stroke volume 104.06 cm3    AV peak gradient 7 mmHg    TV peak gradient 2.92 mmHg    E/E' ratio 4.73 m/s    MV Peak E Avery 0.52 m/s    TR Max Avery 1.89 m/s    MV stenosis pressure 1/2 time 65.45 ms    MV Peak A Avery 0.45 m/s    LV Systolic Volume 21.95 mL    LV Systolic Volume Index 9.9 mL/m2    LV Diastolic Volume 60.68 mL    LV Diastolic Volume Index 27.33 mL/m2    LV Mass Index 50 g/m2    RA Major Axis 3.99 cm    Left Atrium Minor Axis 5.22 cm    Left Atrium Major Axis 5.73 cm    Triscuspid Valve Regurgitation Peak Gradient 14 mmHg    LA WIDTH 4.00 cm    LA volume 56.84 cm3    LA Volume Index 25.6 mL/m2    Right Atrial Pressure (from IVC) 3 mmHg    EF 55 %    TV rest pulmonary artery pressure 17 mmHg    Narrative    · Atrial fibrillation observed.  · The left ventricle is normal in size with concentric remodeling and  · The estimated ejection fraction is 55%.  · Normal right ventricular size with normal right ventricular systolic   function.  · Normal central venous pressure (3 mmHg).  · The estimated PA systolic pressure is 17 mmHg.  · There is no pulmonary hypertension.        Assessment and Plan:     * Atrial fibrillation with rapid ventricular response  01/31/2023 - initiate PO beta blocker for rate control. Anticoagulation c lovenox. CHADSVasc - 0/1. Reported history of hypertension  but on no medications. If he does not convert discussed OLIVER/DCCV. He would like to think about that. If we did do a DCCV he would need at least 6 weeks NOAC.        VTE Risk Mitigation (From admission, onward)         Ordered     enoxaparin injection 110 mg  Every 12 hours (non-standard times)         01/31/23 0907     IP VTE HIGH RISK PATIENT  Once         01/31/23 0403     Place sequential compression device  Until discontinued         01/31/23 0403              Critical Care Time: 30 minutes     Critical care was time spent personally by me on the following activities: development of treatment plan with patient or surrogate and bedside caregivers, discussions with consultants, evaluation of patient's response to treatment, examination of patient, ordering and performing treatments and interventions, ordering and review of laboratory studies, ordering and review of radiographic studies, pulse oximetry, re-evaluation of patient's condition. This critical care time did not overlap with that of any other provider or involve time for any procedures.    Thank you for your consult. I will follow-up with patient. Please contact us if you have any additional questions.    Jonathan Velasquez MD  Cardiology   [facility] - [place] - Intensive Care

## 2023-01-31 NOTE — ASSESSMENT & PLAN NOTE
01/31/2023 - initiate PO beta blocker for rate control. Anticoagulation c lovenox. CHADSVasc - 0/1. Reported history of hypertension but on no medications. If he does not convert discussed OLIVER/DCCV. He would like to think about that. If we did do a DCCV he would need at least 6 weeks NOAC.

## 2023-01-31 NOTE — NURSING
Pt arrived to ICU from outside ER on cardezem gtt on max dose. 12 lead ekg obtained and pt remains in afib with rvr, rates in 80s-110s. VSS will continue to monitor.       [facility] [place]  Nurses Note -- 4 Eyes      1/31/2023       Skin assessed on: Admit      [x] No Pressure Injuries Present    [x]Prevention Measures Documented    [] Yes LDA  for Pressure Injury Previously documented     [] Yes New Pressure Injury Discovered   [] LDA for New Pressure Injury Added      Attending RN:  Jessie Jack RN     Second RN:  GRAEME Morse

## 2023-01-31 NOTE — DISCHARGE SUMMARY
[facility] Medicine  Discharge Summary      Patient Name: Cristo Ruff  MRN: [id number]  [facility]: [id number]  Patient Class: IP- Inpatient  Admission Date: 1/31/2023  Hospital Length of Stay: 0 days  Discharge Date and Time:  01/31/2023 1:43 PM  Attending Physician: Rohit Moss MD   Discharging Provider: Rohit Moss MD  Primary Care Provider: Jarrett Celis MD (Inactive)    Primary Care Team: Networked reference to record PCT     HPI:   This is a 37 year old male with a PMHx of HLD, PERLA on CPAP who presents with palpitations.     Patient presented to the ED with palpitations and mild chest pressure that started the day of presentation. It occured suddently and without a precipitating event. He denies having any other symptoms. He reports that he had similar prior episodes but they were brief and resolved spontaneously. In the ED, he was tachycardic (up to 170), and was transiently hypotensive. Labs were unremarkable. EKG showed afib with RVR. He was given diltiazem 5 mg IV x 2, ativan 0.5 mg IV, 1L of NS, aspirin 325 mg and was started on a diltiazem ggt. The patient was admitted to the ICU for further management.       * No surgery found *      Hospital Course:   38 y/o male presents with palpitations.  Noted to be in rapid Afib and admitted to ICU on Cardizem drip.  Cardiology consulted.  Discussed possible OLIVER with cardioversion, but patient converted to NSR prior to any intervention.  Patient started on B blocker and Cardizem drip stopped.  CHADSVasc - 0/1. Reported history of hypertension but on no medications.  Probable Paroxysmal Afib.  Patient will be started on daily ASA.  He has remained afebrile and hemodynamically stable.  Patient will be discharged home to follow up with PCP and Cardiology referral.       Goals of Care Treatment Preferences:  Code Status: Full Code      Consults:   Consults (From admission, onward)        Status Ordering Provider     Inpatient  consult to Cardiology  Once        Provider:  Jonathan Velasquez MD    Completed [place] COPELAND          No new Assessment & Plan notes have been filed under this hospital service since the last note was generated.  Service: Hospital Medicine    Final Active Diagnoses:    Diagnosis Date Noted POA    PRINCIPAL PROBLEM:  Atrial fibrillation with rapid ventricular response [I48.91] 01/31/2023 Yes    PERLA (obstructive sleep apnea) [G47.33] 01/31/2023 Yes      Problems Resolved During this Admission:       Discharged Condition: stable    Disposition: Home or Self Care    Follow Up:   Follow-up Information     Jarrett Celis MD Follow up in 1 week(s).    Specialty: Family Medicine  Contact information:  [address]  [facility] [address]  [phone]                       Patient Instructions:      Ambulatory referral/consult to Cardiology   Standing Status: Future   Referral Priority: Routine Referral Type: Consultation   Referral Reason: Specialty Services Required   Requested Specialty: Cardiology   Number of Visits Requested: 1     Diet Cardiac     Notify your health care provider if you experience any of the following:  temperature >100.4     Notify your health care provider if you experience any of the following:  persistent nausea and vomiting or diarrhea     Notify your health care provider if you experience any of the following:  severe uncontrolled pain     Notify your health care provider if you experience any of the following:  difficulty breathing or increased cough     Notify your health care provider if you experience any of the following:  persistent dizziness, light-headedness, or visual disturbances     Notify your health care provider if you experience any of the following:  increased confusion or weakness     Activity as tolerated           Pending Diagnostic Studies:     None         Medications:  Reconciled Home Medications:      Medication List      START taking these medications    aspirin  81 MG Chew  Take 1 tablet (81 mg total) by mouth once daily.     metoprolol succinate 25 MG 24 hr tablet  Commonly known as: TOPROL-XL  Take 1 tablet (25 mg total) by mouth once daily.            Indwelling Lines/Drains at time of discharge:   Lines/Drains/Airways     None                 Time spent on the discharge of patient: >30 minutes    Rohit Moss MD  Department of Hospital Medicine  [facility] - [place] - Intensive Care

## 2023-01-31 NOTE — NURSING
Pt dc'd home ambulatory with nurse escort. VSS, no distress noted, denies CP or SOB. Pt given discharge instructions including follow- up info. IVs dc'd with cath tip in place. Pt verbalized understanding of all instructions

## 2023-01-31 NOTE — ASSESSMENT & PLAN NOTE
Presented with palpitations  Found to be in Afib with RVR   CHADsVASc: 0   Received Lovenox, and was started on a diltiazem ggt in the ER.     Plan:   - Diltiazem ggt  - Cardiology consult  - Echo  - Telemetry monitoring

## 2023-01-31 NOTE — H&P
[facility] Medicine  History & Physical    Patient Name: Cristo Ruff  MRN: [id number]  Patient Class: IP- Inpatient  Admission Date: 1/31/2023  Attending Physician: Rohit Moss MD   Primary Care Provider: Jarrett Celis MD (Inactive)         Patient information was obtained from patient and ER records.     Subjective:     Principal Problem:Atrial fibrillation with rapid ventricular response    Chief Complaint:   Chief Complaint   Patient presents with    Palpitations     Pt reports feeling like his heart was racing. Sudden onset tonight that lasted about an hour; reports mild chest pressure.        HPI: This is a 37 year old male with a PMHx of HLD, PERLA on CPAP who presents with palpitations.     Patient presented to the ED with palpitations and mild chest pressure that started the day of presentation. It occured suddently and without a precipitating event. He denies having any other symptoms. He reports that he had similar prior episodes but they were brief and resolved spontaneously. In the ED, he was tachycardic (up to 170), and was transiently hypotensive. Labs were unremarkable. EKG showed afib with RVR. He was given diltiazem 5 mg IV x 2, ativan 0.5 mg IV, 1L of NS, aspirin 325 mg and was started on a diltiazem ggt. The patient was admitted to the ICU for further management.       Past Medical History:   Diagnosis Date    Gastric ulcer due to Helicobacter pylori     Gastric ulcer, acute     GERD (gastroesophageal reflux disease)     Hyperlipemia        Past Surgical History:   Procedure Laterality Date    HERNIA REPAIR         Review of patient's allergies indicates:  No Known Allergies    No current facility-administered medications on file prior to encounter.     Current Outpatient Medications on File Prior to Encounter   Medication Sig    [DISCONTINUED] aspirin 81 MG Chew Take 1 tablet (81 mg total) by mouth once daily. (Patient not taking: Reported on  1/31/2023)    [DISCONTINUED] azelastine (ASTELIN) 137 mcg (0.1 %) nasal spray 2 sprays (274 mcg total) by Nasal route 2 (two) times daily. (Patient not taking: Reported on 1/31/2023)    [DISCONTINUED] famotidine (PEPCID) 20 MG tablet Take 1 tablet (20 mg total) by mouth 2 (two) times daily. for 14 days (Patient not taking: Reported on 1/31/2023)    [DISCONTINUED] fluticasone propionate (FLONASE) 50 mcg/actuation nasal spray 2 sprays (100 mcg total) by Each Nostril route once daily.    [DISCONTINUED] ibuprofen (ADVIL,MOTRIN) 800 MG tablet Take 1 tablet (800 mg total) by mouth every 8 (eight) hours as needed for Pain.    [DISCONTINUED] lansoprazole (PREVACID) 30 MG capsule Take 1 capsule (30 mg total) by mouth once daily. (Patient not taking: Reported on 1/31/2023)    [DISCONTINUED] loratadine (CLARITIN) 10 mg tablet Take 1 tablet (10 mg total) by mouth every morning.    [DISCONTINUED] pantoprazole (PROTONIX) 20 MG tablet Take 1 tablet (20 mg total) by mouth once daily. (Patient not taking: Reported on 1/31/2023)     Family History       Problem Relation (Age of Onset)    Diabetes Mellitus Mother, Father    Hypertension Mother, Father, Sister    Kidney failure Mother          Tobacco Use    Smoking status: Former    Smokeless tobacco: Never   Substance and Sexual Activity    Alcohol use: Yes     Comment: occasionally    Drug use: Yes     Types: Marijuana    Sexual activity: Yes     Partners: Female     Birth control/protection: None     Review of Systems   Constitutional: Negative.    HENT: Negative.     Eyes: Negative.    Respiratory: Negative.     Cardiovascular:  Positive for chest pain and palpitations.   Gastrointestinal: Negative.    Endocrine: Negative.    Genitourinary: Negative.    Musculoskeletal: Negative.    Skin: Negative.    Allergic/Immunologic: Negative.    Neurological: Negative.    Psychiatric/Behavioral: Negative.     Objective:     Vital Signs (Most Recent):  Temp: 98.5 °F (36.9 °C)  (01/31/23 0129)  Pulse: 98 (01/31/23 0357)  Resp: 14 (01/31/23 0357)  BP: 138/70 (01/31/23 0357)  SpO2: 99 % (01/31/23 0357) Vital Signs (24h Range):  Temp:  [98.5 °F (36.9 °C)] 98.5 °F (36.9 °C)  Pulse:  [] 98  Resp:  [14-18] 14  SpO2:  [98 %-99 %] 99 %  BP: ()/(26-89) 138/70     Weight: 110.7 kg (244 lb)  Body mass index is 38.22 kg/m².    Physical Exam  Vitals and nursing note reviewed.   Constitutional:       General: He is not in acute distress.     Appearance: Normal appearance. He is not ill-appearing.   HENT:      Head: Normocephalic and atraumatic.      Nose: Nose normal.      Mouth/Throat:      Mouth: Mucous membranes are moist.   Eyes:      Extraocular Movements: Extraocular movements intact.   Cardiovascular:      Rate and Rhythm: Tachycardia present. Rhythm irregular.      Pulses: Normal pulses.      Heart sounds: No murmur heard.  Pulmonary:      Effort: Pulmonary effort is normal. No respiratory distress.   Abdominal:      General: Abdomen is flat.      Palpations: Abdomen is soft.      Tenderness: There is no abdominal tenderness.   Musculoskeletal:      Right lower leg: No edema.      Left lower leg: No edema.   Skin:     General: Skin is warm.      Capillary Refill: Capillary refill takes less than 2 seconds.   Neurological:      General: No focal deficit present.      Mental Status: He is alert.   Psychiatric:         Mood and Affect: Mood normal.           Significant Labs: All pertinent labs within the past 24 hours have been reviewed.    Significant Imaging: I have reviewed all pertinent imaging results/findings within the past 24 hours.    Assessment/Plan:     * Atrial fibrillation with rapid ventricular response  Presented with palpitations  Found to be in Afib with RVR   CHADsVASc: 0   Received Lovenox, and was started on a diltiazem ggt in the ER.     Plan:   - Diltiazem ggt  - Cardiology consult  - Echo  - Telemetry monitoring     PERLA (obstructive sleep apnea)  Resume  CPAP      VTE Risk Mitigation (From admission, onward)         Ordered     IP VTE HIGH RISK PATIENT  Once         01/31/23 0403     Place sequential compression device  Until discontinued         01/31/23 0403              Critical care time spent on the evaluation and treatment of severe organ dysfunction, review of pertinent labs and imaging studies, discussions with consulting providers and discussions with patient/family: 25 minutes.     Fredy Simons MD  Department of Hospital Medicine   [facility] - [place] - Intensive Care

## 2023-01-31 NOTE — SUBJECTIVE & OBJECTIVE
Past Medical History:   Diagnosis Date    Gastric ulcer due to Helicobacter pylori     Gastric ulcer, acute     GERD (gastroesophageal reflux disease)     Hyperlipemia        Past Surgical History:   Procedure Laterality Date    HERNIA REPAIR         Review of patient's allergies indicates:  No Known Allergies    No current facility-administered medications on file prior to encounter.     Current Outpatient Medications on File Prior to Encounter   Medication Sig    [DISCONTINUED] aspirin 81 MG Chew Take 1 tablet (81 mg total) by mouth once daily. (Patient not taking: Reported on 1/31/2023)    [DISCONTINUED] azelastine (ASTELIN) 137 mcg (0.1 %) nasal spray 2 sprays (274 mcg total) by Nasal route 2 (two) times daily. (Patient not taking: Reported on 1/31/2023)    [DISCONTINUED] famotidine (PEPCID) 20 MG tablet Take 1 tablet (20 mg total) by mouth 2 (two) times daily. for 14 days (Patient not taking: Reported on 1/31/2023)    [DISCONTINUED] fluticasone propionate (FLONASE) 50 mcg/actuation nasal spray 2 sprays (100 mcg total) by Each Nostril route once daily.    [DISCONTINUED] ibuprofen (ADVIL,MOTRIN) 800 MG tablet Take 1 tablet (800 mg total) by mouth every 8 (eight) hours as needed for Pain.    [DISCONTINUED] lansoprazole (PREVACID) 30 MG capsule Take 1 capsule (30 mg total) by mouth once daily. (Patient not taking: Reported on 1/31/2023)    [DISCONTINUED] loratadine (CLARITIN) 10 mg tablet Take 1 tablet (10 mg total) by mouth every morning.    [DISCONTINUED] pantoprazole (PROTONIX) 20 MG tablet Take 1 tablet (20 mg total) by mouth once daily. (Patient not taking: Reported on 1/31/2023)     Family History       Problem Relation (Age of Onset)    Diabetes Mellitus Mother, Father    Hypertension Mother, Father, Sister    Kidney failure Mother          Tobacco Use    Smoking status: Former    Smokeless tobacco: Never   Substance and Sexual Activity    Alcohol use: Yes     Comment: occasionally    Drug use: Yes      Types: Marijuana    Sexual activity: Yes     Partners: Female     Birth control/protection: None     Review of Systems   Constitutional: Negative.    HENT: Negative.     Eyes: Negative.    Respiratory: Negative.     Cardiovascular:  Positive for chest pain and palpitations.   Gastrointestinal: Negative.    Endocrine: Negative.    Genitourinary: Negative.    Musculoskeletal: Negative.    Skin: Negative.    Allergic/Immunologic: Negative.    Neurological: Negative.    Psychiatric/Behavioral: Negative.     Objective:     Vital Signs (Most Recent):  Temp: 98.5 °F (36.9 °C) (01/31/23 0129)  Pulse: 98 (01/31/23 0357)  Resp: 14 (01/31/23 0357)  BP: 138/70 (01/31/23 0357)  SpO2: 99 % (01/31/23 0357) Vital Signs (24h Range):  Temp:  [98.5 °F (36.9 °C)] 98.5 °F (36.9 °C)  Pulse:  [] 98  Resp:  [14-18] 14  SpO2:  [98 %-99 %] 99 %  BP: ()/(26-89) 138/70     Weight: 110.7 kg (244 lb)  Body mass index is 38.22 kg/m².    Physical Exam  Vitals and nursing note reviewed.   Constitutional:       General: He is not in acute distress.     Appearance: Normal appearance. He is not ill-appearing.   HENT:      Head: Normocephalic and atraumatic.      Nose: Nose normal.      Mouth/Throat:      Mouth: Mucous membranes are moist.   Eyes:      Extraocular Movements: Extraocular movements intact.   Cardiovascular:      Rate and Rhythm: Tachycardia present. Rhythm irregular.      Pulses: Normal pulses.      Heart sounds: No murmur heard.  Pulmonary:      Effort: Pulmonary effort is normal. No respiratory distress.   Abdominal:      General: Abdomen is flat.      Palpations: Abdomen is soft.      Tenderness: There is no abdominal tenderness.   Musculoskeletal:      Right lower leg: No edema.      Left lower leg: No edema.   Skin:     General: Skin is warm.      Capillary Refill: Capillary refill takes less than 2 seconds.   Neurological:      General: No focal deficit present.      Mental Status: He is alert.   Psychiatric:          Mood and Affect: Mood normal.           Significant Labs: All pertinent labs within the past 24 hours have been reviewed.    Significant Imaging: I have reviewed all pertinent imaging results/findings within the past 24 hours.

## 2023-01-31 NOTE — HOSPITAL COURSE
38 y/o male presents with palpitations.  Noted to be in rapid Afib and admitted to ICU on Cardizem drip.  Cardiology consulted.  Discussed possible OLIVER with cardioversion, but patient converted to NSR prior to any intervention.  Patient started on B blocker and Cardizem drip stopped.  CHADSVasc - 0/1. Reported history of hypertension but on no medications.  Probable Paroxysmal Afib.  Patient will be started on daily ASA.  He has remained afebrile and hemodynamically stable.  Patient will be discharged home to follow up with PCP and Cardiology referral.

## 2023-01-31 NOTE — ED PROVIDER NOTES
Encounter Date: 1/31/2023       History     Chief Complaint   Patient presents with    Palpitations     Pt reports feeling like his heart was racing. Sudden onset tonight that lasted about an hour; reports mild chest pressure.     This patient presents the emergency department with complaints of feeling a sensation of palpitations like his heart is racing very fast.  Patient states was sudden onset and accompanied by chest pressure.  The patient denied any cough cold fever flu-like symptoms nausea vomiting and diarrhea.  Patient states he is had similar episodes in the past.  No substance abuse    The history is provided by the patient.   Review of patient's allergies indicates:  No Known Allergies  Past Medical History:   Diagnosis Date    Gastric ulcer due to Helicobacter pylori     Gastric ulcer, acute     GERD (gastroesophageal reflux disease)     Hyperlipemia      Past Surgical History:   Procedure Laterality Date    HERNIA REPAIR       Family History   Problem Relation Age of Onset    Hypertension Mother     Diabetes Mellitus Mother     Kidney failure Mother     Hypertension Father     Diabetes Mellitus Father     Hypertension Sister      Social History     Tobacco Use    Smoking status: Former    Smokeless tobacco: Never   Substance Use Topics    Alcohol use: Yes     Comment: occasionally    Drug use: Yes     Types: Marijuana     Review of Systems   Cardiovascular:  Positive for chest pain and palpitations.     Physical Exam     Initial Vitals [01/31/23 0129]   BP Pulse Resp Temp SpO2   130/82 (!) 111 18 98.5 °F (36.9 °C) 98 %      MAP       --         Physical Exam    Nursing note and vitals reviewed.  Constitutional: Vital signs are normal. He appears well-developed. He is active and cooperative.   HENT:   Head: Normocephalic and atraumatic.   Eyes: Conjunctivae, EOM and lids are normal. Pupils are equal, round, and reactive to light.   Neck: Trachea normal. Neck supple. No thyroid mass present.    Full  passive range of motion without pain.     Cardiovascular:  S1 normal, S2 normal, normal heart sounds, intact distal pulses and normal pulses. An irregularly irregular rhythm present.   Tachycardia present.   Exam reveals no gallop and no distant heart sounds.       No murmur heard.  Pulmonary/Chest: Effort normal and breath sounds normal.   Abdominal: Abdomen is soft and flat. Bowel sounds are normal. There is no abdominal tenderness.   Musculoskeletal:         General: Normal range of motion.      Cervical back: Full passive range of motion without pain and neck supple.     Lymphadenopathy:     He has no axillary adenopathy.   Neurological: He is alert and oriented to person, place, and time. He has normal strength. No cranial nerve deficit or sensory deficit. GCS eye subscore is 4. GCS verbal subscore is 5. GCS motor subscore is 6.   Skin: Skin is warm, dry and intact.   Psychiatric: He has a normal mood and affect. His speech is normal and behavior is normal. Judgment and thought content normal. Cognition and memory are normal.       ED Course   Critical Care    Date/Time: 1/31/2023 2:36 AM  Performed by: Jason Palmer MD  Authorized by: Jason Palmer MD   Direct patient critical care time: 12 minutes  Additional history critical care time: 11 minutes  Ordering / reviewing critical care time: 10 minutes  Documentation critical care time: 9 minutes  Consulting other physicians critical care time: 3 minutes  Total critical care time (exclusive of procedural time) : 45 minutes  Critical care time was exclusive of separately billable procedures and treating other patients and teaching time.  Critical care was necessary to treat or prevent imminent or life-threatening deterioration of the following conditions: cardiac failure and circulatory failure.  Critical care was time spent personally by me on the following activities: evaluation of patient's response to treatment, examination of patient, ordering  and performing treatments and interventions, ordering and review of laboratory studies, ordering and review of radiographic studies, pulse oximetry, re-evaluation of patient's condition and review of old charts.      Results for orders placed or performed during the hospital encounter of 01/31/23   Troponin ISTAT   Result Value Ref Range    POC Cardiac Troponin I 0.00 0.00 - 0.08 ng/mL    Sample unknown    POCT CBC   Result Value Ref Range    Hematocrit      Hemoglobin      RBC      WBC      MCV      MCH, POC      MCHC      RDW-CV      Platelet Count, POC      MPV     ISTAT PROCEDURE   Result Value Ref Range    POC PTWBT 14.8 (H) 9.7 - 14.3 sec    POC PTINR 1.2 0.9 - 1.2    Sample unknown    POCT CMP   Result Value Ref Range    Albumin, POC 3.7 3.3 - 5.5 g/dL    Alkaline Phosphatase, POC 77 42 - 141 U/L    ALT (SGPT), POC 26 10 - 47 U/L    AST (SGOT), POC 25 11 - 38 U/L    POC BUN 13 7 - 22 mg/dL    Calcium, POC 9.6 8.0 - 10.3 mg/dL    POC Chloride 106 98 - 108 mmol/L    POC Creatinine 1.1 0.6 - 1.2 mg/dL    POC Glucose 104 73 - 118 mg/dL    POC Potassium 3.7 3.6 - 5.1 mmol/L    POC Sodium 138 128 - 145 mmol/L    Bilirubin, POC 0.6 0.2 - 1.6 mg/dL    POC TCO2 29 18 - 33 mmol/L    Protein, POC 7.9 6.4 - 8.1 g/dL     Imaging Results              X-Ray Chest AP Portable (Final result)  Result time 01/31/23 02:07:09      Final result by Armen Matthews MD (01/31/23 02:07:09)                   Impression:      Radiographic findings as above.      Electronically signed by: Armen Matthews  Date:    01/31/2023  Time:    02:07               Narrative:    EXAMINATION:  XR CHEST AP PORTABLE    CLINICAL HISTORY:  Chest Pain;    TECHNIQUE:  Single frontal view of the chest was performed.    COMPARISON:  Chest radiograph July 17, 2021    FINDINGS:  Single portable chest view is submitted.  Objects overlie the patient.  When accounting for position and technique and depth of inspiration the appearance of the cardiomediastinal  silhouette is stable.  There is mild elevation of the left hemidiaphragm.    Mild atelectatic change noted.  There is no evidence for superimposed confluent infiltrate or consolidation, significant pleural effusion or pneumothorax.    The visualized osseous structures appear intact.                                      Labs Reviewed   ISTAT PROCEDURE - Abnormal; Notable for the following components:       Result Value    POC PTWBT 14.8 (*)     All other components within normal limits   TROPONIN ISTAT   TSH   POCT CBC   POCT CMP   POCT PROTIME-INR   POCT TROPONIN   POCT CMP     EKG Readings: (Independently Interpreted)   Rhythm: Atrial Fibrillation. Heart Rate: 165. Ectopy: No Ectopy. ST Segments: Non-Specific ST Segment Depression. T Waves: Normal. Axis: Normal. Clinical Impression: Atrial Fibrillation     Imaging Results              X-Ray Chest AP Portable (Final result)  Result time 01/31/23 02:07:09      Final result by Armen Matthews MD (01/31/23 02:07:09)                   Impression:      Radiographic findings as above.      Electronically signed by: Armen Matthews  Date:    01/31/2023  Time:    02:07               Narrative:    EXAMINATION:  XR CHEST AP PORTABLE    CLINICAL HISTORY:  Chest Pain;    TECHNIQUE:  Single frontal view of the chest was performed.    COMPARISON:  Chest radiograph July 17, 2021    FINDINGS:  Single portable chest view is submitted.  Objects overlie the patient.  When accounting for position and technique and depth of inspiration the appearance of the cardiomediastinal silhouette is stable.  There is mild elevation of the left hemidiaphragm.    Mild atelectatic change noted.  There is no evidence for superimposed confluent infiltrate or consolidation, significant pleural effusion or pneumothorax.    The visualized osseous structures appear intact.                                       Medications   diltiaZEM 125 mg in D5W 125 mL infusion (10 mg/hr Intravenous Rate/Dose Change  1/31/23 0228)   sodium chloride 0.9% bolus 1,000 mL 1,000 mL (1,000 mLs Intravenous New Bag 1/31/23 0140)   aspirin tablet 325 mg (325 mg Oral Given 1/31/23 0202)   enoxaparin injection 100 mg (100 mg Subcutaneous Given 1/31/23 0202)   diltiaZEM injection 5 mg (5 mg Intravenous Given 1/31/23 0156)   diltiaZEM injection 5 mg (5 mg Intravenous Given 1/31/23 0141)   LORazepam (ATIVAN) injection 0.5 mg (0.5 mg Intravenous Given 1/31/23 0226)   acetaminophen tablet 650 mg (650 mg Oral Given 1/31/23 0226)     Medical Decision Making:   ED Management:  37-year-old male with history of hyperlipidemia presented to the emergency department at [facility] secondary to sensation of palpitations and rapid heartbeat.  Patient states he is had similar episodes in the past but they were very short-lived this 1 is longer lasting.  Patient placed on monitor here found to be in AFib with RVR currently received 10 mg of Cardizem IV and titrating his Cardizem drip at present to get his rate under control.  Patient has also been given Lovenox 1 milligram/kilogram.  Patient is currently stable and I will continue to titrate his Cardizem.  Cardizem does not get his rate under control my plan is to use amiodarone in addition unless you have other suggestions.  My differential included ACS, pulmonary processes and infectious etiologies.  Patient appeared to have a primary rhythm related problem.  I did send off a TSH.  Patient denied any substance abuse so do not feel a drug screen is indicated at this time.  I spoke with Dr. Butcher and the patient will be an admission to the intensive care unit.  The patient's rhythm is responsive to Cardizem drip.                        Clinical Impression:   Final diagnoses:  [R00.2] Palpitations  [I49.9] Arrhythmia  [I48.91] Atrial fibrillation with RVR (Primary)               Jason Palmer MD  01/31/23 [id number]

## 2023-01-31 NOTE — HPI
Cristo Ruff is a 38yo male presents with palpitations.  He is had similar episodes over a number of years but they usually resolve.  In the emergency room he was tachycardic.  Hypotensive.  EKG showed atrial fibrillation rapid ventricular response.  He was given diltiazem IV.  Aspirin.  Admitted to ICU.  Echocardiogram shows normal left ventricular systolic function.    Previously seen by Dr. Cedillo in 2020.    Echocardiogram 01/31/2023:    Atrial fibrillation observed.  The left ventricle is normal in size with concentric remodeling and  The estimated ejection fraction is 55%.  Normal right ventricular size with normal right ventricular systolic function.  Normal central venous pressure (3 mmHg).  The estimated PA systolic pressure is 17 mmHg.  There is no pulmonary hypertension.    Stress echocardiogram 08/10/2020:    Normal left ventricular systolic function. The estimated ejection fraction is 55%.  Normal LV diastolic function.  Normal right ventricular systolic function.  The estimated PA systolic pressure is 30 mmHg.  There were no arrhythmias during stress.  The ECG portion of this study is negative for myocardial ischemia.  The stress echo portion of this study is negative for myocardial ischemia.

## 2023-02-01 ENCOUNTER — TELEPHONE (OUTPATIENT)
Dept: CARDIOLOGY | Facility: CLINIC | Age: 38
End: 2023-02-01
Payer: MEDICAID

## 2023-02-01 NOTE — TELEPHONE ENCOUNTER
----- Message from Jonathan Velasquez MD sent at 1/31/2023  9:50 PM CST -----  Regarding: RE: [id number]  I think he was discharged. Make sure he follows up    ----- Message -----  From: Lurdes Silvestre MA  Sent: 1/31/2023   3:52 PM CST  To: Jonathan Velasquez MD  Subject: FW: [id number]                             Is there anything else this patient need to do on the cardiology side?  ----- Message -----  From: Montserrat Woods  Sent: 1/31/2023  10:45 AM CST  To: Eva Castillo Staff  Subject: [id number]                                 Type: Patient Call Back    Who called:Obed Swanson    What is the request in detail: nurse states that she wanted to notify the doctor of the pt complaining about 3 out of 10 chest pain and convert to normal sonus rhythm , she has a EKG going. She wanted to know if the doctor wanted her to do anything different .    Can the clinic reply by FREDRICKSTRICIA?no    Would the patient rather a call back or a response via [facility]? Call back    Best call back number:[phone]    Additional Information:

## 2023-02-03 ENCOUNTER — PATIENT OUTREACH (OUTPATIENT)
Dept: ADMINISTRATIVE | Facility: CLINIC | Age: 38
End: 2023-02-03
Payer: MEDICAID

## 2023-02-03 DIAGNOSIS — I48.91 ATRIAL FIBRILLATION WITH RAPID VENTRICULAR RESPONSE: Primary | ICD-10-CM

## 2023-02-03 NOTE — PROGRESS NOTES
C3 nurse spoke with Cristo Ruff  for a TCC post hospital discharge follow up call. The patient does not have a scheduled HOSFU appointment with Non [facility] PCP within 5-7 days post hospital discharge date 01/31/2023. C3 nurse was unable to schedule HOSFU appointment in [facility].    Please contact pcp and schedule follow up appointment using HOSFU visit type on or before 02/07/2023.    NP referral placed

## 2023-02-06 ENCOUNTER — HOSPITAL ENCOUNTER (EMERGENCY)
Facility: HOSPITAL | Age: 38
Discharge: HOME | End: 2023-02-06
Attending: EMERGENCY MEDICINE
Payer: MEDICAID

## 2023-02-06 VITALS
WEIGHT: 244 LBS | BODY MASS INDEX: 36.14 KG/M2 | HEIGHT: 69 IN | SYSTOLIC BLOOD PRESSURE: 134 MMHG | DIASTOLIC BLOOD PRESSURE: 89 MMHG | OXYGEN SATURATION: 98 % | HEART RATE: 64 BPM | RESPIRATION RATE: 16 BRPM | TEMPERATURE: 98 F

## 2023-02-06 DIAGNOSIS — R07.9 CHEST PAIN, UNSPECIFIED TYPE: Primary | ICD-10-CM

## 2023-02-06 DIAGNOSIS — J90 PLEURAL EFFUSION: ICD-10-CM

## 2023-02-06 DIAGNOSIS — R06.00 DYSPNEA, UNSPECIFIED TYPE: ICD-10-CM

## 2023-02-06 DIAGNOSIS — I48.91 ATRIAL FIBRILLATION: ICD-10-CM

## 2023-02-06 LAB
ALBUMIN SERPL BCP-MCNC: 4 G/DL (ref 3.5–5.2)
ALP SERPL-CCNC: 74 U/L (ref 55–135)
ALT SERPL W/O P-5'-P-CCNC: 27 U/L (ref 10–44)
ANION GAP SERPL CALC-SCNC: 11 MMOL/L (ref 8–16)
AST SERPL-CCNC: 26 U/L (ref 10–40)
BASOPHILS # BLD AUTO: 0.05 K/UL (ref 0–0.2)
BASOPHILS NFR BLD: 0.6 % (ref 0–1.9)
BILIRUB SERPL-MCNC: 0.3 MG/DL (ref 0.1–1)
BILIRUB UR QL STRIP: NEGATIVE
BNP SERPL-MCNC: <10 PG/ML (ref 0–99)
BUN SERPL-MCNC: 14 MG/DL (ref 6–20)
CALCIUM SERPL-MCNC: 9.8 MG/DL (ref 8.7–10.5)
CHLORIDE SERPL-SCNC: 106 MMOL/L (ref 95–110)
CLARITY UR REFRACT.AUTO: CLEAR
CO2 SERPL-SCNC: 22 MMOL/L (ref 23–29)
COLOR UR AUTO: YELLOW
CREAT SERPL-MCNC: 1 MG/DL (ref 0.5–1.4)
DIFFERENTIAL METHOD: ABNORMAL
EOSINOPHIL # BLD AUTO: 0.3 K/UL (ref 0–0.5)
EOSINOPHIL NFR BLD: 3.3 % (ref 0–8)
ERYTHROCYTE [DISTWIDTH] IN BLOOD BY AUTOMATED COUNT: 15.1 % (ref 11.5–14.5)
ERYTHROCYTE [SEDIMENTATION RATE] IN BLOOD BY PHOTOMETRIC METHOD: 35 MM/HR (ref 0–23)
EST. GFR  (NO RACE VARIABLE): >60 ML/MIN/1.73 M^2
GLUCOSE SERPL-MCNC: 86 MG/DL (ref 70–110)
GLUCOSE UR QL STRIP: NEGATIVE
HCT VFR BLD AUTO: 43.8 % (ref 40–54)
HCV AB SERPL QL IA: NORMAL
HGB BLD-MCNC: 13.5 G/DL (ref 14–18)
HGB UR QL STRIP: ABNORMAL
HIV 1+2 AB+HIV1 P24 AG SERPL QL IA: NORMAL
IMM GRANULOCYTES # BLD AUTO: 0.03 K/UL (ref 0–0.04)
IMM GRANULOCYTES NFR BLD AUTO: 0.4 % (ref 0–0.5)
KETONES UR QL STRIP: NEGATIVE
LEUKOCYTE ESTERASE UR QL STRIP: NEGATIVE
LYMPHOCYTES # BLD AUTO: 2.3 K/UL (ref 1–4.8)
LYMPHOCYTES NFR BLD: 28.7 % (ref 18–48)
MAGNESIUM SERPL-MCNC: 2.3 MG/DL (ref 1.6–2.6)
MCH RBC QN AUTO: 26 PG (ref 27–31)
MCHC RBC AUTO-ENTMCNC: 30.8 G/DL (ref 32–36)
MCV RBC AUTO: 84 FL (ref 82–98)
MONOCYTES # BLD AUTO: 0.7 K/UL (ref 0.3–1)
MONOCYTES NFR BLD: 9.1 % (ref 4–15)
NEUTROPHILS # BLD AUTO: 4.6 K/UL (ref 1.8–7.7)
NEUTROPHILS NFR BLD: 57.9 % (ref 38–73)
NITRITE UR QL STRIP: NEGATIVE
NRBC BLD-RTO: 0 /100 WBC
PH UR STRIP: 6 [PH] (ref 5–8)
PLATELET # BLD AUTO: 300 K/UL (ref 150–450)
PMV BLD AUTO: 12.4 FL (ref 9.2–12.9)
POTASSIUM SERPL-SCNC: 4.6 MMOL/L (ref 3.5–5.1)
PROT SERPL-MCNC: 8 G/DL (ref 6–8.4)
PROT UR QL STRIP: NEGATIVE
RBC # BLD AUTO: 5.2 M/UL (ref 4.6–6.2)
SODIUM SERPL-SCNC: 139 MMOL/L (ref 136–145)
SP GR UR STRIP: 1.02 (ref 1–1.03)
TROPONIN I SERPL DL<=0.01 NG/ML-MCNC: <0.006 NG/ML (ref 0–0.03)
TROPONIN I SERPL DL<=0.01 NG/ML-MCNC: <0.006 NG/ML (ref 0–0.03)
URN SPEC COLLECT METH UR: ABNORMAL
WBC # BLD AUTO: 7.99 K/UL (ref 3.9–12.7)

## 2023-02-06 PROCEDURE — 99285 EMERGENCY DEPT VISIT HI MDM: CPT | Mod: 25

## 2023-02-06 PROCEDURE — 85025 COMPLETE CBC W/AUTO DIFF WBC: CPT | Performed by: EMERGENCY MEDICINE

## 2023-02-06 PROCEDURE — 84484 ASSAY OF TROPONIN QUANT: CPT | Mod: 91 | Performed by: EMERGENCY MEDICINE

## 2023-02-06 PROCEDURE — 99284 EMERGENCY DEPT VISIT MOD MDM: CPT | Mod: ,,, | Performed by: EMERGENCY MEDICINE

## 2023-02-06 PROCEDURE — 81003 URINALYSIS AUTO W/O SCOPE: CPT | Performed by: EMERGENCY MEDICINE

## 2023-02-06 PROCEDURE — 80053 COMPREHEN METABOLIC PANEL: CPT | Performed by: EMERGENCY MEDICINE

## 2023-02-06 PROCEDURE — 83880 ASSAY OF NATRIURETIC PEPTIDE: CPT | Performed by: EMERGENCY MEDICINE

## 2023-02-06 PROCEDURE — 83735 ASSAY OF MAGNESIUM: CPT | Performed by: EMERGENCY MEDICINE

## 2023-02-06 PROCEDURE — 86803 HEPATITIS C AB TEST: CPT | Performed by: PHYSICIAN ASSISTANT

## 2023-02-06 PROCEDURE — 87389 HIV-1 AG W/HIV-1&-2 AB AG IA: CPT | Performed by: PHYSICIAN ASSISTANT

## 2023-02-06 PROCEDURE — 99284 PR EMERGENCY DEPT VISIT,LEVEL IV: ICD-10-PCS | Mod: ,,, | Performed by: EMERGENCY MEDICINE

## 2023-02-06 PROCEDURE — 85652 RBC SED RATE AUTOMATED: CPT | Performed by: EMERGENCY MEDICINE

## 2023-02-06 PROCEDURE — 93005 ELECTROCARDIOGRAM TRACING: CPT

## 2023-02-06 PROCEDURE — 93010 ELECTROCARDIOGRAM REPORT: CPT | Mod: ,,, | Performed by: INTERNAL MEDICINE

## 2023-02-06 PROCEDURE — 93010 EKG 12-LEAD: ICD-10-PCS | Mod: ,,, | Performed by: INTERNAL MEDICINE

## 2023-02-06 RX ORDER — LEVOFLOXACIN 750 MG/1
750 TABLET ORAL DAILY
Qty: 5 TABLET | Refills: 0 | Status: SHIPPED | OUTPATIENT
Start: 2023-02-06

## 2023-02-06 NOTE — ED PROVIDER NOTES
Encounter Date: 2/6/2023    SCRIBE #1 NOTE: I, Yareli Ríos, am scribing for, and in the presence of,  Gibran Lizama MD. I have scribed the entire note. the EKG reading.     History     Chief Complaint   Patient presents with    Multiple complaints     Admitted last week with a fib, legs from knees down feel numb, sob, urinating often     Time patient was seen by the provider: 11:39 PM      The patient is a 37 y.o. male with past medical history of HLD who presents to the ED with a complaint of BLE tingling. This is a patient who was admitted on 1/31/2023 for atrial fibrillation. He was given metoprolol and instructed to take this in conjunction with aspirin. He has been compliant with his current regimen. Since then, the patient reports tingling to the bilateral lower legs with associated increase in urination. His tingling episodes lasts between 30 minutes to an hour. He also endorses dyspnea on exertion. Notes an episode of CP occurring yesterday for approximately 2.5 hours. Denies SOB, nausea, and diaphoresis at the time. Of note, the patient reports a chronic productive cough that has been ongoing since his COVID infection last year.    The history is provided by the patient and medical records. No  was used.   Review of patient's allergies indicates:  No Known Allergies  Past Medical History:   Diagnosis Date    Gastric ulcer due to Helicobacter pylori     Gastric ulcer, acute     GERD (gastroesophageal reflux disease)     Hyperlipemia     Paroxysmal atrial fibrillation     Sleep apnea, unspecified      Past Surgical History:   Procedure Laterality Date    HERNIA REPAIR       Family History   Problem Relation Age of Onset    Hypertension Mother     Diabetes Mellitus Mother     Kidney failure Mother     Hypertension Father     Diabetes Mellitus Father     Hypertension Sister      Social History     Tobacco Use    Smoking status: Former    Smokeless tobacco: Never   Substance Use Topics     Alcohol use: Yes     Comment: occasionally    Drug use: Yes     Types: Marijuana     Comment: Daily use     Review of Systems   Constitutional:  Negative for fever.   HENT:  Negative for sore throat.    Eyes:  Negative for visual disturbance.   Respiratory:  Positive for cough. Negative for shortness of breath.         + CANTU   Cardiovascular:  Negative for chest pain.   Gastrointestinal:  Negative for abdominal pain, diarrhea, nausea and vomiting.   Genitourinary:  Positive for frequency. Negative for dysuria.   Musculoskeletal:  Negative for neck pain.   Skin:  Negative for rash and wound.   Allergic/Immunologic: Negative for immunocompromised state.   Neurological:  Negative for syncope.        + BLE tingling   Psychiatric/Behavioral:  Negative for confusion.      Physical Exam     Initial Vitals [02/06/23 1051]   BP Pulse Resp Temp SpO2   (!) 159/70 92 18 97.5 °F (36.4 °C) 97 %      MAP       --         Physical Exam    Nursing note and vitals reviewed.  Constitutional: He appears well-developed and well-nourished. He is not diaphoretic. No distress.   HENT:   Head: Normocephalic and atraumatic.   Eyes: EOM are normal. Pupils are equal, round, and reactive to light.   Neck: Neck supple.   Normal range of motion.  Cardiovascular:  Normal rate, regular rhythm, normal heart sounds and intact distal pulses.     Exam reveals no gallop and no friction rub.       No murmur heard.  Pulmonary/Chest: Breath sounds normal. No respiratory distress. He has no wheezes. He has no rhonchi. He has no rales.   Abdominal: Abdomen is soft. He exhibits no distension. There is no abdominal tenderness. There is no rebound and no guarding.   Musculoskeletal:         General: No tenderness or edema. Normal range of motion.      Cervical back: Normal range of motion and neck supple.      Right lower leg: No swelling or tenderness. No edema.      Left lower leg: No swelling or tenderness. No edema.      Comments: Negative Galina's. No  palpable cords. No calf tenderness or peripheral edema.     Neurological: He is alert and oriented to person, place, and time. He has normal strength.   Skin: Skin is warm and dry. No rash noted.       ED Course   Procedures  Labs Reviewed   CBC W/ AUTO DIFFERENTIAL - Abnormal; Notable for the following components:       Result Value    Hemoglobin 13.5 (*)     MCH 26.0 (*)     MCHC 30.8 (*)     RDW 15.1 (*)     All other components within normal limits   COMPREHENSIVE METABOLIC PANEL - Abnormal; Notable for the following components:    CO2 22 (*)     All other components within normal limits   URINALYSIS, REFLEX TO URINE CULTURE - Abnormal; Notable for the following components:    Occult Blood UA Trace (*)     All other components within normal limits    Narrative:     Specimen Source->Urine   HIV 1 / 2 ANTIBODY    Narrative:     Release to patient->Immediate   HEPATITIS C ANTIBODY    Narrative:     Release to patient->Immediate   B-TYPE NATRIURETIC PEPTIDE   MAGNESIUM   TROPONIN I   TROPONIN I   SEDIMENTATION RATE     EKG Readings: (Independently Interpreted)   Rhythm: Normal Sinus Rhythm. Heart Rate: 88. Axis: Normal.   Normal intervals. No ischemic changes.   ECG Results              EKG 12-lead (Final result)  Result time 02/06/23 12:28:41      Final result by Interface, Lab In [facility] (02/06/23 12:28:41)                   Narrative:    Test Reason : I48.91,    Vent. Rate : 088 BPM     Atrial Rate : 088 BPM     P-R Int : 152 ms          QRS Dur : 080 ms      QT Int : 332 ms       P-R-T Axes : 055 018 051 degrees     QTc Int : 401 ms    Normal sinus rhythm  Possible Left atrial enlargement  Borderline Abnormal ECG  When compared with ECG of 31-JAN-2023 10:23,  No significant change was found  Confirmed by Raquel Fenton MD ([id number]) on 2/6/2023 12:28:27 PM    Referred By:             Confirmed By:Raquel Fenton MD                                  Imaging Results              X-Ray Chest PA And Lateral (Final  result)  Result time 02/06/23 13:27:43      Final result by Meng Garza MD (02/06/23 13:27:43)                   Impression:      See above      Electronically signed by: Meng Garza MD  Date:    02/06/2023  Time:    13:27               Narrative:    EXAMINATION:  XR CHEST PA AND LATERAL    CLINICAL HISTORY:  Chest Pain;    TECHNIQUE:  PA and lateral views of the chest were performed.    COMPARISON:  No 01/31/2023 ne    FINDINGS:  Heart size normal.  Slight opacification identified at the left lung base laterally with slightly blunted costophrenic angle possible small area of volume loss or trace of pleural effusion.  Otherwise the lungs are clear.                                       Medications - No data to display  Medical Decision Making:   History:   Old Medical Records: I decided to obtain old medical records.  Initial Assessment:   This is an emergent evaluation. Because of the dyspnea on exertion and his episode of CP yesterday, a cardiac workup has been ordered. Electrolytes and blood sugar will be checked secondary to bilateral lower extremity tingling and urinary frequency. CXR has been ordered secondary to chronic productive cough. He is compliant with aspirin and metoprolol. He is not currently in atrial fibrillation. I will reassess.  Clinical Tests:   Lab Tests: Ordered and Reviewed  Radiological Study: Ordered and Reviewed  Medical Tests: Ordered and Reviewed  ED Management:  16:16  The patient is resting comfortably. He denies any symptoms at this time. Serial trops are negative. BMP WNL. He has no obvious infectious symptoms. I believe that his cough may be secondary to pleural effusion noted on CXR. The etiology is unclear. I will prescribe him Levaquin. The patient has been instructed to follow up closely with PCP in the next couple of days. He sates he already has an appointment with his cardiologist in the next couple of days. At this time, I feel he is clinically stable for discharge.         Scribe Attestation:   Scribe #1: I performed the above scribed service and the documentation accurately describes the services I performed. I attest to the accuracy of the note.                   Clinical Impression:   Final diagnoses:  [I48.91] Atrial fibrillation  [R07.9] Chest pain, unspecified type (Primary)  [R06.00] Dyspnea, unspecified type  [J90] Pleural effusion        ED Disposition Condition    Discharge Stable          ED Prescriptions       Medication Sig Dispense Start Date End Date Auth. Provider    levoFLOXacin (LEVAQUIN) 750 MG tablet Take 1 tablet (750 mg total) by mouth once daily. 5 tablet 2/6/2023 -- Gibran Lizama MD          Follow-up Information       Follow up With Specialties Details Why Contact Info Additional Information    [facility] - Cardiology - 3rd Fl Cardiology In 3 days as scheduled [address] [id number] [phone] Cardiology Services Clinics - 3rd floor    [facility] In 3 days or your PCP [address] [id number] [phone] [facility] Please park in surface lot and check in at central registration desk            I, Dr. Gibran Lizama, personally performed the services described in this documentation. All medical record entries made by the scribe were at my direction and in my presence.  I have reviewed the chart and agree that the record reflects my personal performance and is accurate and complete. Gibran Lizama MD.  4:25 PM 02/06/2023       Gibran Lizama MD  02/06/23 1626

## 2023-02-06 NOTE — Clinical Note
"Cristo Higgins" Speedy was seen and treated in our emergency department on 2/6/2023.  He may return to work on 02/07/2023.       If you have any questions or concerns, please don't hesitate to call.      ANTWAN Nguyen RN    "

## 2023-02-06 NOTE — ED NOTES
Patient identifiers verified and correct for  Mr Penwright  C/C:  BLE pain SEE NN  APPEARANCE: awake and alert in NAD. PAIN  6/10  SKIN: warm, dry and intact. No breakdown or bruising.  MUSCULOSKELETAL: Patient moving all extremities spontaneously, no obvious swelling or deformities noted. Ambulates independently.  RESPIRATORY: Denies shortness of breath.Respirations unlabored.   CARDIAC: Denies CP, 2+ distal pulses; no peripheral edema  ABDOMEN: S/ND/NT, Denies nausea  : voids spontaneously, denies difficulty  Neurologic: AAO x 4; follows commands equal strength in all extremities; denies numbness/tingling. Denies dizziness  Deneis weakness

## 2023-02-09 ENCOUNTER — OFFICE VISIT (OUTPATIENT)
Dept: CARDIOLOGY | Facility: CLINIC | Age: 38
End: 2023-02-09
Payer: MEDICAID

## 2023-02-09 VITALS
BODY MASS INDEX: 35.55 KG/M2 | DIASTOLIC BLOOD PRESSURE: 82 MMHG | SYSTOLIC BLOOD PRESSURE: 130 MMHG | WEIGHT: 240.75 LBS

## 2023-02-09 DIAGNOSIS — R07.89 OTHER CHEST PAIN: Primary | ICD-10-CM

## 2023-02-09 DIAGNOSIS — I48.91 ATRIAL FIBRILLATION WITH RVR: ICD-10-CM

## 2023-02-09 DIAGNOSIS — Z86.16 HISTORY OF COVID-19: ICD-10-CM

## 2023-02-09 PROCEDURE — 99999 PR PBB SHADOW E&M-EST. PATIENT-LVL III: CPT | Mod: PBBFAC,,, | Performed by: INTERNAL MEDICINE

## 2023-02-09 PROCEDURE — 3008F PR BODY MASS INDEX (BMI) DOCUMENTED: ICD-10-PCS | Mod: CPTII,,, | Performed by: INTERNAL MEDICINE

## 2023-02-09 PROCEDURE — 3075F PR MOST RECENT SYSTOLIC BLOOD PRESS GE 130-139MM HG: ICD-10-PCS | Mod: CPTII,,, | Performed by: INTERNAL MEDICINE

## 2023-02-09 PROCEDURE — 1159F MED LIST DOCD IN RCRD: CPT | Mod: CPTII,,, | Performed by: INTERNAL MEDICINE

## 2023-02-09 PROCEDURE — 99999 PR PBB SHADOW E&M-EST. PATIENT-LVL III: ICD-10-PCS | Mod: PBBFAC,,, | Performed by: INTERNAL MEDICINE

## 2023-02-09 PROCEDURE — 1159F PR MEDICATION LIST DOCUMENTED IN MEDICAL RECORD: ICD-10-PCS | Mod: CPTII,,, | Performed by: INTERNAL MEDICINE

## 2023-02-09 PROCEDURE — 3008F BODY MASS INDEX DOCD: CPT | Mod: CPTII,,, | Performed by: INTERNAL MEDICINE

## 2023-02-09 PROCEDURE — 99214 PR OFFICE/OUTPT VISIT, EST, LEVL IV, 30-39 MIN: ICD-10-PCS | Mod: S$PBB,,, | Performed by: INTERNAL MEDICINE

## 2023-02-09 PROCEDURE — 3079F DIAST BP 80-89 MM HG: CPT | Mod: CPTII,,, | Performed by: INTERNAL MEDICINE

## 2023-02-09 PROCEDURE — 99214 OFFICE O/P EST MOD 30 MIN: CPT | Mod: S$PBB,,, | Performed by: INTERNAL MEDICINE

## 2023-02-09 PROCEDURE — 3079F PR MOST RECENT DIASTOLIC BLOOD PRESSURE 80-89 MM HG: ICD-10-PCS | Mod: CPTII,,, | Performed by: INTERNAL MEDICINE

## 2023-02-09 PROCEDURE — 3075F SYST BP GE 130 - 139MM HG: CPT | Mod: CPTII,,, | Performed by: INTERNAL MEDICINE

## 2023-02-09 PROCEDURE — 99213 OFFICE O/P EST LOW 20 MIN: CPT | Mod: PBBFAC | Performed by: INTERNAL MEDICINE

## 2023-02-09 NOTE — PROGRESS NOTES
CARDIOLOGY CLINIC VISIT        HISTORY OF PRESENT ILLNESS:     01/31/2023: Cristo Ruff is a 36yo male presents with palpitations.  He is had similar episodes over a number of years but they usually resolve.  In the emergency room he was tachycardic.  Hypotensive.  EKG showed atrial fibrillation rapid ventricular response.  He was given diltiazem IV.  Aspirin.  Admitted to ICU.  Echocardiogram shows normal left ventricular systolic function. Spontaneously converted to sinus rhythm.    02/09/2023: Heart rate has been controlled. Has noted some left sided pain at rest and exertion. Can last > minute. Also feels since he had COVID it is difficult to clear secretions.     CARDIOVASCULAR HISTORY:     Paroxysmal atrial fibrillation    PAST MEDICAL HISTORY:     Past Medical History:   Diagnosis Date    Gastric ulcer due to Helicobacter pylori     Gastric ulcer, acute     GERD (gastroesophageal reflux disease)     Hyperlipemia     Paroxysmal atrial fibrillation     Sleep apnea, unspecified        PAST SURGICAL HISTORY:     Past Surgical History:   Procedure Laterality Date    HERNIA REPAIR         ALLERGIES AND MEDICATION:   Review of patient's allergies indicates:  No Known Allergies     Medication List            Accurate as of February 9, 2023  3:47 PM. If you have any questions, ask your nurse or doctor.                CONTINUE taking these medications      aspirin 81 MG Chew  Take 1 tablet (81 mg total) by mouth once daily.     levoFLOXacin 750 MG tablet  Commonly known as: LEVAQUIN  Take 1 tablet (750 mg total) by mouth once daily.     metoprolol succinate 25 MG 24 hr tablet  Commonly known as: TOPROL-XL  Take 1 tablet (25 mg total) by mouth once daily.              SOCIAL HISTORY:     Social History     Socioeconomic History    Marital status: Single   Occupational History    Occupation:     Occupation: Automotive detailing   Tobacco Use    Smoking status: Former    Smokeless tobacco: Never    Substance and Sexual Activity    Alcohol use: Yes     Comment: occasionally    Drug use: Yes     Types: Marijuana     Comment: Daily use    Sexual activity: Yes     Partners: Female     Birth control/protection: None       FAMILY HISTORY:     Family History   Problem Relation Age of Onset    Hypertension Mother     Diabetes Mellitus Mother     Kidney failure Mother     Hypertension Father     Diabetes Mellitus Father     Hypertension Sister        REVIEW OF SYSTEMS:   Review of Systems   Constitutional:  Negative for chills, diaphoresis, fever, malaise/fatigue and weight loss.   HENT:  Negative for congestion, hearing loss, sinus pain, sore throat and tinnitus.    Eyes:  Negative for blurred vision, double vision, photophobia and pain.   Respiratory:  Negative for cough, hemoptysis, sputum production, shortness of breath, wheezing and stridor.    Cardiovascular:  Negative for chest pain, palpitations, orthopnea, claudication, leg swelling and PND.   Gastrointestinal:  Negative for abdominal pain, blood in stool, heartburn, melena, nausea and vomiting.   Musculoskeletal:  Negative for back pain, falls, joint pain, myalgias and neck pain.   Neurological:  Negative for dizziness, tingling, tremors, sensory change, speech change, focal weakness, seizures, loss of consciousness, weakness and headaches.   Endo/Heme/Allergies:  Does not bruise/bleed easily.   Psychiatric/Behavioral:  Negative for depression, memory loss and substance abuse. The patient is not nervous/anxious.      PHYSICAL EXAM:     Vitals:    02/09/23 1534   BP: 130/82    Body mass index is 35.55 kg/m².  Weight: 109.2 kg (240 lb 11.9 oz)         Physical Exam  Vitals reviewed.   Constitutional:       General: He is not in acute distress.     Appearance: He is well-developed. He is not diaphoretic.   Neck:      Vascular: No carotid bruit or JVD.   Cardiovascular:      Rate and Rhythm: Normal rate and regular rhythm.      Pulses: Normal pulses.      Heart  sounds: Normal heart sounds.   Pulmonary:      Effort: Pulmonary effort is normal.      Breath sounds: Normal breath sounds.   Abdominal:      General: Bowel sounds are normal.      Palpations: Abdomen is soft.      Tenderness: There is no abdominal tenderness.   Musculoskeletal:      Right lower leg: No edema.      Left lower leg: No edema.   Skin:     General: Skin is warm and dry.   Neurological:      Mental Status: He is alert and oriented to person, place, and time.   Psychiatric:         Speech: Speech normal.         Behavior: Behavior normal.         Thought Content: Thought content normal.       DATA:   EKG: (personally reviewed tracing)    Laboratory:  CBC:  Recent Labs   Lab 01/31/23  0524 02/06/23  1210   WBC 9.48 7.99   Hemoglobin 13.6 L 13.5 L   Hematocrit 42.2 43.8   Platelets 273 300       CHEMISTRIES:  Recent Labs   Lab 01/31/23  0524 02/06/23  1210   Glucose 118 H 86   Sodium 138 139   Potassium 4.3 4.6   BUN 10 14   Creatinine 0.9 1.0   Calcium 8.8 9.8   Magnesium 2.1 2.3       CARDIAC BIOMARKERS:  Recent Labs   Lab 02/06/23  1210 02/06/23  1513   Troponin I <0.006 <0.006       COAGS:  Recent Labs   Lab 06/26/20  1950 12/11/20  1633 01/31/23  0142   INR 0.9 1.0 1.2       LIPIDS/LFTS:  Recent Labs   Lab 02/06/23  1210   AST 26   ALT 27       Cardiovascular Testing:    Echocardiogram 01/31/2023:     Atrial fibrillation observed.  The left ventricle is normal in size with concentric remodeling and  The estimated ejection fraction is 55%.  Normal right ventricular size with normal right ventricular systolic function.  Normal central venous pressure (3 mmHg).  The estimated PA systolic pressure is 17 mmHg.  There is no pulmonary hypertension.     Stress echocardiogram 08/10/2020:     Normal left ventricular systolic function. The estimated ejection fraction is 55%.  Normal LV diastolic function.  Normal right ventricular systolic function.  The estimated PA systolic pressure is 30 mmHg.  There were no  arrhythmias during stress.  The ECG portion of this study is negative for myocardial ischemia.  The stress echo portion of this study is negative for myocardial ischemia.    ASSESSMENT:     Chest pain  Paroxysmal atrial fibrillation  History of COVID    PLAN:     Chest pain: Exercise ekg  Paroxysmal atrial fibrillation: continue current management.  History of COVID: Pulmonary referral.  Return to clinic one month.           Jonathan Velasquez MD, MPH, FACC, [facility]

## 2023-02-16 ENCOUNTER — HOSPITAL ENCOUNTER (OUTPATIENT)
Dept: CARDIOLOGY | Facility: HOSPITAL | Age: 38
Discharge: HOME | End: 2023-02-16
Attending: INTERNAL MEDICINE
Payer: MEDICAID

## 2023-02-16 DIAGNOSIS — R07.89 OTHER CHEST PAIN: ICD-10-CM

## 2023-02-16 DIAGNOSIS — I48.91 ATRIAL FIBRILLATION WITH RVR: ICD-10-CM

## 2023-02-16 LAB
CV STRESS BASE HR: 68 BPM
DIASTOLIC BLOOD PRESSURE: 80 MMHG
OHS CV CPX 1 MINUTE RECOVERY HEART RATE: 112 BPM
OHS CV CPX 85 PERCENT MAX PREDICTED HEART RATE MALE: 156
OHS CV CPX ESTIMATED METS: 11
OHS CV CPX MAX PREDICTED HEART RATE: 183
OHS CV CPX PATIENT IS FEMALE: 0
OHS CV CPX PATIENT IS MALE: 1
OHS CV CPX PEAK DIASTOLIC BLOOD PRESSURE: 89 MMHG
OHS CV CPX PEAK HEAR RATE: 141 BPM
OHS CV CPX PEAK RATE PRESSURE PRODUCT: NORMAL
OHS CV CPX PEAK SYSTOLIC BLOOD PRESSURE: 172 MMHG
OHS CV CPX PERCENT MAX PREDICTED HEART RATE ACHIEVED: 77
OHS CV CPX RATE PRESSURE PRODUCT PRESENTING: 8160
STRESS ECHO POST EXERCISE DUR MIN: 9 MINUTES
STRESS ECHO POST EXERCISE DUR SEC: 50 SECONDS
SYSTOLIC BLOOD PRESSURE: 120 MMHG

## 2023-02-16 PROCEDURE — 93018 EXERCISE STRESS - EKG (CUPID ONLY): ICD-10-PCS | Mod: ,,, | Performed by: INTERNAL MEDICINE

## 2023-02-16 PROCEDURE — 93016 EXERCISE STRESS - EKG (CUPID ONLY): ICD-10-PCS | Mod: ,,, | Performed by: INTERNAL MEDICINE

## 2023-02-16 PROCEDURE — 93018 CV STRESS TEST I&R ONLY: CPT | Mod: ,,, | Performed by: INTERNAL MEDICINE

## 2023-02-16 PROCEDURE — 93016 CV STRESS TEST SUPVJ ONLY: CPT | Mod: ,,, | Performed by: INTERNAL MEDICINE

## 2023-03-09 ENCOUNTER — OFFICE VISIT (OUTPATIENT)
Dept: CARDIOLOGY | Facility: CLINIC | Age: 38
End: 2023-03-09
Payer: MEDICAID

## 2023-03-09 VITALS
DIASTOLIC BLOOD PRESSURE: 65 MMHG | RESPIRATION RATE: 18 BRPM | BODY MASS INDEX: 35.47 KG/M2 | HEIGHT: 69 IN | OXYGEN SATURATION: 98 % | HEART RATE: 72 BPM | SYSTOLIC BLOOD PRESSURE: 116 MMHG | WEIGHT: 239.5 LBS

## 2023-03-09 DIAGNOSIS — R07.89 OTHER CHEST PAIN: Primary | ICD-10-CM

## 2023-03-09 DIAGNOSIS — I48.0 PAF (PAROXYSMAL ATRIAL FIBRILLATION): ICD-10-CM

## 2023-03-09 DIAGNOSIS — Z86.16 HISTORY OF COVID-19: ICD-10-CM

## 2023-03-09 PROCEDURE — 99213 OFFICE O/P EST LOW 20 MIN: CPT | Mod: PBBFAC | Performed by: INTERNAL MEDICINE

## 2023-03-09 PROCEDURE — 3074F SYST BP LT 130 MM HG: CPT | Mod: CPTII,,, | Performed by: INTERNAL MEDICINE

## 2023-03-09 PROCEDURE — 99214 PR OFFICE/OUTPT VISIT, EST, LEVL IV, 30-39 MIN: ICD-10-PCS | Mod: S$PBB,,, | Performed by: INTERNAL MEDICINE

## 2023-03-09 PROCEDURE — 99214 OFFICE O/P EST MOD 30 MIN: CPT | Mod: S$PBB,,, | Performed by: INTERNAL MEDICINE

## 2023-03-09 PROCEDURE — 3008F PR BODY MASS INDEX (BMI) DOCUMENTED: ICD-10-PCS | Mod: CPTII,,, | Performed by: INTERNAL MEDICINE

## 2023-03-09 PROCEDURE — 99999 PR PBB SHADOW E&M-EST. PATIENT-LVL III: ICD-10-PCS | Mod: PBBFAC,,, | Performed by: INTERNAL MEDICINE

## 2023-03-09 PROCEDURE — 3078F DIAST BP <80 MM HG: CPT | Mod: CPTII,,, | Performed by: INTERNAL MEDICINE

## 2023-03-09 PROCEDURE — 3078F PR MOST RECENT DIASTOLIC BLOOD PRESSURE < 80 MM HG: ICD-10-PCS | Mod: CPTII,,, | Performed by: INTERNAL MEDICINE

## 2023-03-09 PROCEDURE — 1159F MED LIST DOCD IN RCRD: CPT | Mod: CPTII,,, | Performed by: INTERNAL MEDICINE

## 2023-03-09 PROCEDURE — 1159F PR MEDICATION LIST DOCUMENTED IN MEDICAL RECORD: ICD-10-PCS | Mod: CPTII,,, | Performed by: INTERNAL MEDICINE

## 2023-03-09 PROCEDURE — 3074F PR MOST RECENT SYSTOLIC BLOOD PRESSURE < 130 MM HG: ICD-10-PCS | Mod: CPTII,,, | Performed by: INTERNAL MEDICINE

## 2023-03-09 PROCEDURE — 3008F BODY MASS INDEX DOCD: CPT | Mod: CPTII,,, | Performed by: INTERNAL MEDICINE

## 2023-03-09 PROCEDURE — 99999 PR PBB SHADOW E&M-EST. PATIENT-LVL III: CPT | Mod: PBBFAC,,, | Performed by: INTERNAL MEDICINE

## 2023-03-09 NOTE — PROGRESS NOTES
CARDIOLOGY CLINIC VISIT        HISTORY OF PRESENT ILLNESS:     01/31/2023: Cristo Ruff is a 38yo male presents with palpitations.  He has had similar episodes over a number of years but they usually resolve.  In the emergency room he was tachycardic.  Hypotensive.  EKG showed atrial fibrillation rapid ventricular response.  He was given diltiazem IV.  Aspirin.  Admitted to ICU.  Echocardiogram shows normal left ventricular systolic function. Spontaneously converted to sinus rhythm.    02/09/2023: Heart rate has been controlled. Has noted some left sided pain at rest and exertion. Can last > minute. Also feels since he had COVID it is difficult to clear secretions.     03/09/2023:  Exercise EKG negative for ischemia.  Target heart rate not achieved.  Patient exercised for 9 minutes 50 seconds.  Functional capacity 11 Mets. Has been to the ED since last visit. Has followed up with a Cardiologist at [facility]. Now wearing event monitor.    CARDIOVASCULAR HISTORY:     Paroxysmal atrial fibrillation    PAST MEDICAL HISTORY:     Past Medical History:   Diagnosis Date    Gastric ulcer due to Helicobacter pylori     Gastric ulcer, acute     GERD (gastroesophageal reflux disease)     Hyperlipemia     Paroxysmal atrial fibrillation     Sleep apnea, unspecified        PAST SURGICAL HISTORY:     Past Surgical History:   Procedure Laterality Date    HERNIA REPAIR         ALLERGIES AND MEDICATION:   Review of patient's allergies indicates:  No Known Allergies     Medication List            Accurate as of March 9, 2023  3:30 PM. If you have any questions, ask your nurse or doctor.                CONTINUE taking these medications      aspirin 81 MG Chew  Take 1 tablet (81 mg total) by mouth once daily.     levoFLOXacin 750 MG tablet  Commonly known as: LEVAQUIN  Take 1 tablet (750 mg total) by mouth once daily.     metoprolol succinate 25 MG 24 hr tablet  Commonly known as: TOPROL-XL  Take 1 tablet (25 mg total) by mouth once  "daily.              SOCIAL HISTORY:     Social History     Socioeconomic History    Marital status: Single   Occupational History    Occupation:     Occupation: Automotive detailing   Tobacco Use    Smoking status: Former    Smokeless tobacco: Never   Substance and Sexual Activity    Alcohol use: Yes     Comment: occasionally    Drug use: Yes     Types: Marijuana     Comment: Daily use    Sexual activity: Yes     Partners: Female     Birth control/protection: None       FAMILY HISTORY:     Family History   Problem Relation Age of Onset    Hypertension Mother     Diabetes Mellitus Mother     Kidney failure Mother     Hypertension Father     Diabetes Mellitus Father     Hypertension Sister        REVIEW OF SYSTEMS:   Review of Systems   Constitutional:  Negative for chills, diaphoresis, fever, malaise/fatigue and weight loss.   HENT:  Negative for congestion, hearing loss, sinus pain, sore throat and tinnitus.    Eyes:  Negative for blurred vision, double vision, photophobia and pain.   Respiratory:  Negative for cough, hemoptysis, sputum production, shortness of breath, wheezing and stridor.    Cardiovascular:  Positive for chest pain. Negative for palpitations, orthopnea, claudication, leg swelling and PND.   Gastrointestinal:  Negative for abdominal pain, blood in stool, heartburn, melena, nausea and vomiting.   Musculoskeletal:  Negative for back pain, falls, joint pain, myalgias and neck pain.   Neurological:  Negative for dizziness, tingling, tremors, sensory change, speech change, focal weakness, seizures, loss of consciousness, weakness and headaches.   Endo/Heme/Allergies:  Does not bruise/bleed easily.   Psychiatric/Behavioral:  Negative for depression, memory loss and substance abuse. The patient is not nervous/anxious.      PHYSICAL EXAM:     Vitals:    03/09/23 1521   BP: 116/65   Pulse: 72   Resp: 18      Body mass index is 35.37 kg/m².  Weight: 108.7 kg (239 lb 8.5 oz)   Height: 5' 9" " (175.3 cm)     Physical Exam  Vitals reviewed.   Constitutional:       General: He is not in acute distress.     Appearance: He is well-developed. He is not diaphoretic.   Neck:      Vascular: No carotid bruit or JVD.   Cardiovascular:      Rate and Rhythm: Normal rate and regular rhythm.      Pulses: Normal pulses.      Heart sounds: Normal heart sounds.   Pulmonary:      Effort: Pulmonary effort is normal.      Breath sounds: Normal breath sounds.   Abdominal:      General: Bowel sounds are normal.      Palpations: Abdomen is soft.      Tenderness: There is no abdominal tenderness.   Musculoskeletal:      Right lower leg: No edema.      Left lower leg: No edema.   Skin:     General: Skin is warm and dry.   Neurological:      Mental Status: He is alert and oriented to person, place, and time.   Psychiatric:         Speech: Speech normal.         Behavior: Behavior normal.         Thought Content: Thought content normal.       DATA:   EKG: (personally reviewed tracing)    Laboratory:  CBC:  Recent Labs   Lab 01/31/23  0524 02/06/23  1210   WBC 9.48 7.99   Hemoglobin 13.6 L 13.5 L   Hematocrit 42.2 43.8   Platelets 273 300       CHEMISTRIES:  Recent Labs   Lab 01/31/23  0524 02/06/23  1210   Glucose 118 H 86   Sodium 138 139   Potassium 4.3 4.6   BUN 10 14   Creatinine 0.9 1.0   Calcium 8.8 9.8   Magnesium 2.1 2.3       CARDIAC BIOMARKERS:  Recent Labs   Lab 02/06/23  1210 02/06/23  1513   Troponin I <0.006 <0.006       COAGS:  Recent Labs   Lab 06/26/20  1950 12/11/20  1633 01/31/23  0142   INR 0.9 1.0 1.2       LIPIDS/LFTS:  Recent Labs   Lab 02/06/23  1210   AST 26   ALT 27       Cardiovascular Testing:    Exercise EKG 02/16/2023:      The patient exercised for 9 minutes 50 seconds on a Gerald protocol, corresponding to a functional capacity of 11 METS, achieving a peak heart rate of 141 bpm, which is 77 % of the age predicted maximum heart rate. Their exercise capacity was average.    The ECG portion of the study  is negative for ischemia. Sensitivity is reduced secondary to the target heart rate not being achieved.    Sensitivity is reduced secondary to the target heart rate not being achieved.    The patient reported no chest pain during the stress test.    The blood pressure response to stress was normal.    The exercise capacity was average.    Echocardiogram 01/31/2023:     Atrial fibrillation observed.  The left ventricle is normal in size with concentric remodeling and  The estimated ejection fraction is 55%.  Normal right ventricular size with normal right ventricular systolic function.  Normal central venous pressure (3 mmHg).  The estimated PA systolic pressure is 17 mmHg.  There is no pulmonary hypertension.     Stress echocardiogram 08/10/2020:     Normal left ventricular systolic function. The estimated ejection fraction is 55%.  Normal LV diastolic function.  Normal right ventricular systolic function.  The estimated PA systolic pressure is 30 mmHg.  There were no arrhythmias during stress.  The ECG portion of this study is negative for myocardial ischemia.  The stress echo portion of this study is negative for myocardial ischemia.    ASSESSMENT:     Chest pain  Paroxysmal atrial fibrillation  History of COVID    PLAN:     Chest pain: Exercise ekg negative for ischemia. Good exercise capacity. Sensitivity reduced secondary to reach target HR.  Paroxysmal atrial fibrillation: continue current management. Has event monitor.  Return to clinic six months/PRN. Is going to [facility].           Jonathan Velasquez MD, MPH, FACC, [facility]

## 2024-03-11 ENCOUNTER — HOSPITAL ENCOUNTER (EMERGENCY)
Facility: HOSPITAL | Age: 39
Discharge: HOME | End: 2024-03-12
Attending: INTERNAL MEDICINE

## 2024-03-11 DIAGNOSIS — R07.9 CHEST PAIN: Primary | ICD-10-CM

## 2024-03-11 DIAGNOSIS — F41.9 ANXIETY: ICD-10-CM

## 2024-03-11 DIAGNOSIS — R07.89 CHEST DISCOMFORT: ICD-10-CM

## 2024-03-11 DIAGNOSIS — G44.209 TENSION HEADACHE: ICD-10-CM

## 2024-03-11 PROCEDURE — 99284 EMERGENCY DEPT VISIT MOD MDM: CPT | Mod: 25,ER

## 2024-03-11 PROCEDURE — 93005 ELECTROCARDIOGRAM TRACING: CPT | Mod: ER

## 2024-03-11 PROCEDURE — 93010 ELECTROCARDIOGRAM REPORT: CPT | Mod: ,,, | Performed by: INTERNAL MEDICINE

## 2024-03-11 RX ORDER — ASPIRIN 325 MG
325 TABLET ORAL
Status: COMPLETED | OUTPATIENT
Start: 2024-03-12 | End: 2024-03-12

## 2024-03-12 VITALS
OXYGEN SATURATION: 95 % | HEIGHT: 69 IN | WEIGHT: 234 LBS | SYSTOLIC BLOOD PRESSURE: 122 MMHG | HEART RATE: 68 BPM | BODY MASS INDEX: 34.66 KG/M2 | TEMPERATURE: 98 F | RESPIRATION RATE: 18 BRPM | DIASTOLIC BLOOD PRESSURE: 67 MMHG

## 2024-03-12 PROBLEM — R09.1 PLEURISY: Status: ACTIVE | Noted: 2024-03-12

## 2024-03-12 PROBLEM — G44.209 TENSION HEADACHE: Status: ACTIVE | Noted: 2024-03-12

## 2024-03-12 PROBLEM — F41.9 ANXIETY: Status: ACTIVE | Noted: 2024-03-12

## 2024-03-12 LAB
ALBUMIN SERPL-MCNC: 3.9 G/DL (ref 3.3–5.5)
ALP SERPL-CCNC: 68 U/L (ref 42–141)
BILIRUB SERPL-MCNC: 0.5 MG/DL (ref 0.2–1.6)
BUN SERPL-MCNC: 12 MG/DL (ref 7–22)
CALCIUM SERPL-MCNC: 9.9 MG/DL (ref 8–10.3)
CHLORIDE SERPL-SCNC: 108 MMOL/L (ref 98–108)
CREAT SERPL-MCNC: 1 MG/DL (ref 0.6–1.2)
GLUCOSE SERPL-MCNC: 95 MG/DL (ref 73–118)
HCT, POC: NORMAL
HGB, POC: NORMAL (ref 14–18)
MCH, POC: NORMAL
MCHC, POC: NORMAL
MCV, POC: NORMAL
MPV, POC: NORMAL
OHS QRS DURATION: 86 MS
OHS QTC CALCULATION: 389 MS
POC ALT (SGPT): 18 U/L (ref 10–47)
POC AST (SGOT): 20 U/L (ref 11–38)
POC CARDIAC TROPONIN I: 0 NG/ML (ref 0–0.08)
POC CARDIAC TROPONIN I: 0.01 NG/ML (ref 0–0.08)
POC PLATELET COUNT: NORMAL
POC TCO2: 27 MMOL/L (ref 18–33)
POTASSIUM BLD-SCNC: 4.1 MMOL/L (ref 3.6–5.1)
PROTEIN, POC: 7.7 G/DL (ref 6.4–8.1)
RBC, POC: NORMAL
RDW, POC: NORMAL
SAMPLE: NORMAL
SAMPLE: NORMAL
SODIUM BLD-SCNC: 141 MMOL/L (ref 128–145)
WBC, POC: NORMAL

## 2024-03-12 PROCEDURE — 63600175 PHARM REV CODE 636 W HCPCS: Mod: ER | Performed by: INTERNAL MEDICINE

## 2024-03-12 PROCEDURE — 85025 COMPLETE CBC W/AUTO DIFF WBC: CPT | Mod: ER

## 2024-03-12 PROCEDURE — 80053 COMPREHEN METABOLIC PANEL: CPT | Mod: ER

## 2024-03-12 PROCEDURE — 84484 ASSAY OF TROPONIN QUANT: CPT | Mod: ER

## 2024-03-12 PROCEDURE — 96374 THER/PROPH/DIAG INJ IV PUSH: CPT | Mod: ER

## 2024-03-12 PROCEDURE — 25000003 PHARM REV CODE 250: Mod: ER | Performed by: INTERNAL MEDICINE

## 2024-03-12 RX ADMIN — ASPIRIN 325 MG ORAL TABLET 325 MG: 325 PILL ORAL at 12:03

## 2024-03-12 RX ADMIN — LORAZEPAM 1 MG: 2 INJECTION INTRAMUSCULAR; INTRAVENOUS at 12:03

## 2024-03-12 NOTE — ED PROVIDER NOTES
Encounter Date: 3/11/2024    SCRIBE #1 NOTE: I, Cezar Baltazar, am scribing for, and in the presence of,  Elvin Lux MD. I have scribed the following portions of the note - Other sections scribed: HPI,ROS,PE.       History     Chief Complaint   Patient presents with    Chest Pain     C/o chest pain upon arrival to ER. Pt reports initial complaint of headache starting this morning. Pt states midsternal chest pain of 9/10. Hx of Afib, took blood thinner 4 days ago.     Cristo Ruff is a 38 y.o. male, with a PMHx of GERD, gastric ulcer, HLD, HTN and atrial fibrillation, who presents to the ED with chest pain and headache onset PTA. Patient reports that he began having generalized headache this morning and decided to come to the emergency department.  After arrival to the emergency department, patient endorses feeling of anxiety and  chest discomfort. Patient rates the pain to be a 9/10.  He states he has not taken Xarelto in 4 days, but has medication at home.    The history is provided by the patient. No  was used.     Review of patient's allergies indicates:  No Known Allergies  Past Medical History:   Diagnosis Date    A-fib     Gastric ulcer due to Helicobacter pylori     Gastric ulcer, acute     GERD (gastroesophageal reflux disease)     Hyperlipemia     Hypertension     Paroxysmal atrial fibrillation     Sleep apnea, unspecified      Past Surgical History:   Procedure Laterality Date    HERNIA REPAIR       Family History   Problem Relation Age of Onset    Hypertension Mother     Diabetes Mellitus Mother     Kidney failure Mother     Hypertension Father     Diabetes Mellitus Father     Hypertension Sister      Social History     Tobacco Use    Smoking status: Former    Smokeless tobacco: Never   Substance Use Topics    Alcohol use: Yes     Comment: occasionally    Drug use: Yes     Types: Marijuana     Comment: Daily use     Review of Systems   Constitutional:  Negative for  fever.   HENT:  Negative for sore throat.    Respiratory:  Negative for shortness of breath.    Cardiovascular:  Positive for chest pain.   Gastrointestinal:  Negative for diarrhea, nausea and vomiting.   Genitourinary:  Negative for dysuria.   Musculoskeletal:  Negative for back pain.   Skin:  Negative for rash.   Neurological:  Positive for headaches. Negative for weakness.   Psychiatric/Behavioral:  Negative for behavioral problems. The patient is nervous/anxious.    All other systems reviewed and are negative.      Physical Exam     Initial Vitals [03/11/24 2222]   BP Pulse Resp Temp SpO2   135/81 77 18 98.3 °F (36.8 °C) 99 %      MAP       --         Physical Exam    Nursing note and vitals reviewed.  Constitutional: He appears well-developed and well-nourished.   Anxious appearing   HENT:   Head: Normocephalic and atraumatic.   Right Ear: External ear normal.   Left Ear: External ear normal.   Mouth/Throat: Oropharynx is clear and moist.   Eyes: Conjunctivae and EOM are normal. Pupils are equal, round, and reactive to light.   Neck: Neck supple.   Normal range of motion.  Cardiovascular:  Normal rate, regular rhythm and normal heart sounds.     Exam reveals no gallop and no friction rub.       No murmur heard.  Pulmonary/Chest: Breath sounds normal. No respiratory distress. He has no wheezes. He has no rhonchi. He has no rales.   Abdominal: Abdomen is soft. There is no abdominal tenderness.   Musculoskeletal:         General: No edema. Normal range of motion.      Cervical back: Normal range of motion and neck supple.     Neurological: He is alert and oriented to person, place, and time. GCS score is 15. GCS eye subscore is 4. GCS verbal subscore is 5. GCS motor subscore is 6.   Skin: Skin is warm and dry. Capillary refill takes less than 2 seconds.   Psychiatric: He has a normal mood and affect.         ED Course   Procedures  Labs Reviewed   TROPONIN ISTAT   TROPONIN ISTAT   POCT CBC   POCT CMP   POCT  TROPONIN   POCT CMP   POCT TROPONIN     EKG Readings: (Independently Interpreted)   Initial Reading: No STEMI. Rhythm: Normal Sinus Rhythm. Heart Rate: 79. Ectopy: No Ectopy. Conduction: Normal. ST Segments: Normal ST Segments. T Waves: Normal. Clinical Impression: Normal Sinus Rhythm       Imaging Results              X-Ray Chest AP Portable (Final result)  Result time 03/12/24 00:22:42      Final result by Darek Jean Baptiste MD (03/12/24 00:22:42)                   Impression:      No acute cardiopulmonary process identified.      Electronically signed by: Darek Jean Baptiste MD  Date:    03/12/2024  Time:    00:22               Narrative:    EXAMINATION:  XR CHEST AP PORTABLE    CLINICAL HISTORY:  Other chest pain    TECHNIQUE:  Single frontal view of the chest was performed.    COMPARISON:  February 2023.    FINDINGS:  Cardiac silhouette is normal in size.  Lungs are symmetrically expanded.  No evidence of focal consolidative process, pneumothorax, or significant pleural effusion.  No acute osseous abnormality identified.                                       Medications   LORazepam (ATIVAN) injection 1 mg (1 mg Intravenous Given 3/12/24 0020)   aspirin tablet 325 mg (325 mg Oral Given 3/12/24 0020)     Medical Decision Making  Cristo Ruff is a 38 y.o. male, with a PMHx of GERD, gastric ulcer, HLD, HTN and atrial fibrillation, who presents to the ED with chest pain and headache onset PTA. Patient reports that he began having generalized headache this morning and decided to come to the emergency department.  After arrival to the emergency department, patient endorses feeling of anxiety and  chest discomfort. Patient rates the pain to be a 9/10.  He states he has not taken Xarelto in 4 days, but has medication at home.  Course of ED stay:  1. Cardiovascular-patient has been hemodynamically stable throughout ED stay.  EKG shows normal sinus rhythm and troponins are negative x2.  Patient's chest discomfort  resolved after receiving Ativan and aspirin.  2. Pulmonary-patient has had normal O2 sats on room air with no signs or symptoms of respiratory distress.  Chest x-ray shows no acute abnormalities.  3. Hematology/infectious disease-patient has been afebrile and CBC is reassuring.  4. GI/nutrition/renal/endocrine-CMP is reassuring.  5. Neuro-headache resolved after patient received aspirin.  Patient was given instructions for noncardiac chest discomfort/anxiety/tension headache and advised to follow-up with his primary care physician within the next 3 days for re-evaluation/return to the emergency department if condition worsens.    Amount and/or Complexity of Data Reviewed  Labs: ordered.  Radiology: ordered.  ECG/medicine tests: ordered.     Details: EKG independently interpreted by Elvin Lux MD reads: see above.      Risk  OTC drugs.            Scribe Attestation:   Scribe #1: I performed the above scribed service and the documentation accurately describes the services I performed. I attest to the accuracy of the note.              This document was produced by a scribe under my direction and in my presence. I agree with the content of the note and have made any necessary edits.     Dr. Lux    03/12/2024 2:58 AM                    Clinical Impression:  Final diagnoses:  [R07.89] Chest discomfort  [G44.209] Tension headache  [F41.9] Anxiety          ED Disposition Condition    Discharge Stable          ED Prescriptions    None       Follow-up Information       Follow up With Specialties Details Why Contact [facility] ED Emergency Medicine Call  If symptoms worsen [id number] [facility] [id number] [phone]             Elvin Lux MD  03/12/24 1517

## 2024-03-12 NOTE — DISCHARGE INSTRUCTIONS
Follow-up with your primary care physician or cardiologist within the next 3 days for re-evaluation.

## 2024-04-21 ENCOUNTER — HOSPITAL ENCOUNTER (EMERGENCY)
Facility: HOSPITAL | Age: 39
Discharge: HOME | End: 2024-04-21
Attending: EMERGENCY MEDICINE

## 2024-04-21 VITALS
TEMPERATURE: 98 F | BODY MASS INDEX: 36.43 KG/M2 | WEIGHT: 246 LBS | HEART RATE: 68 BPM | RESPIRATION RATE: 18 BRPM | HEIGHT: 69 IN | SYSTOLIC BLOOD PRESSURE: 128 MMHG | OXYGEN SATURATION: 68 % | DIASTOLIC BLOOD PRESSURE: 76 MMHG

## 2024-04-21 DIAGNOSIS — S16.1XXA STRAIN OF NECK MUSCLE, INITIAL ENCOUNTER: ICD-10-CM

## 2024-04-21 DIAGNOSIS — M25.519 SHOULDER PAIN: ICD-10-CM

## 2024-04-21 DIAGNOSIS — M25.511 ACUTE PAIN OF RIGHT SHOULDER: Primary | ICD-10-CM
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
## 2024-04-21 PROCEDURE — 25000003 PHARM REV CODE 250: Mod: ER | Performed by: PHYSICIAN ASSISTANT

## 2024-04-21 PROCEDURE — 63600175 PHARM REV CODE 636 W HCPCS: Mod: ER | Performed by: PHYSICIAN ASSISTANT

## 2024-04-21 PROCEDURE — 96372 THER/PROPH/DIAG INJ SC/IM: CPT | Performed by: PHYSICIAN ASSISTANT

## 2024-04-21 PROCEDURE — 99285 EMERGENCY DEPT VISIT HI MDM: CPT | Mod: 25,ER

## 2024-04-21 RX ORDER — OXYCODONE HYDROCHLORIDE 5 MG/1
5 TABLET ORAL EVERY 4 HOURS PRN
Qty: 12 TABLET | Refills: 0 | Status: SHIPPED | OUTPATIENT
Start: 2024-04-21 | End: 2024-04-24

## 2024-04-21 RX ORDER — ACETAMINOPHEN 500 MG
500 TABLET ORAL
Status: COMPLETED | OUTPATIENT
Start: 2024-04-21 | End: 2024-04-21

## 2024-04-21 RX ORDER — ACETAMINOPHEN 500 MG
500 TABLET ORAL EVERY 4 HOURS PRN
Qty: 20 TABLET | Refills: 0 | Status: SHIPPED | OUTPATIENT
Start: 2024-04-21 | End: 2024-04-26

## 2024-04-21 RX ORDER — DEXAMETHASONE SODIUM PHOSPHATE 4 MG/ML
8 INJECTION, SOLUTION INTRA-ARTICULAR; INTRALESIONAL; INTRAMUSCULAR; INTRAVENOUS; SOFT TISSUE
Status: COMPLETED | OUTPATIENT
Start: 2024-04-21 | End: 2024-04-21

## 2024-04-21 RX ADMIN — DEXAMETHASONE SODIUM PHOSPHATE 8 MG: 4 INJECTION INTRA-ARTICULAR; INTRALESIONAL; INTRAMUSCULAR; INTRAVENOUS; SOFT TISSUE at 02:04

## 2024-04-21 RX ADMIN — ACETAMINOPHEN 500 MG: 500 TABLET ORAL at 02:04

## 2024-04-21 NOTE — DISCHARGE INSTRUCTIONS
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
## 2024-04-25 ENCOUNTER — HOSPITAL ENCOUNTER (EMERGENCY)
Facility: HOSPITAL | Age: 39
Discharge: HOME | End: 2024-04-25
Attending: EMERGENCY MEDICINE
Payer: COMMERCIAL

## 2024-04-25 VITALS
WEIGHT: 234 LBS | BODY MASS INDEX: 34.56 KG/M2 | SYSTOLIC BLOOD PRESSURE: 136 MMHG | HEART RATE: 75 BPM | OXYGEN SATURATION: 98 % | DIASTOLIC BLOOD PRESSURE: 92 MMHG | RESPIRATION RATE: 18 BRPM | TEMPERATURE: 99 F

## 2024-04-25 DIAGNOSIS — R51.9 NONINTRACTABLE HEADACHE, UNSPECIFIED CHRONICITY PATTERN, UNSPECIFIED HEADACHE TYPE: Primary | ICD-10-CM

## 2024-04-25 LAB — POCT GLUCOSE: 115 MG/DL (ref 70–110)

## 2024-04-25 PROCEDURE — 99284 EMERGENCY DEPT VISIT MOD MDM: CPT | Mod: 25

## 2024-04-25 PROCEDURE — 96372 THER/PROPH/DIAG INJ SC/IM: CPT | Performed by: STUDENT IN AN ORGANIZED HEALTH CARE EDUCATION/TRAINING PROGRAM

## 2024-04-25 PROCEDURE — 82962 GLUCOSE BLOOD TEST: CPT

## 2024-04-25 PROCEDURE — 25000003 PHARM REV CODE 250: Performed by: STUDENT IN AN ORGANIZED HEALTH CARE EDUCATION/TRAINING PROGRAM

## 2024-04-25 PROCEDURE — 63600175 PHARM REV CODE 636 W HCPCS: Performed by: STUDENT IN AN ORGANIZED HEALTH CARE EDUCATION/TRAINING PROGRAM

## 2024-04-25 RX ORDER — KETOROLAC TROMETHAMINE 30 MG/ML
30 INJECTION, SOLUTION INTRAMUSCULAR; INTRAVENOUS
Status: COMPLETED | OUTPATIENT
Start: 2024-04-25 | End: 2024-04-25

## 2024-04-25 RX ORDER — BUTALBITAL, ACETAMINOPHEN AND CAFFEINE 50; 325; 40 MG/1; MG/1; MG/1
1 TABLET ORAL
Status: COMPLETED | OUTPATIENT
Start: 2024-04-25 | End: 2024-04-25

## 2024-04-25 RX ORDER — TIZANIDINE 4 MG/1
4 TABLET ORAL
Status: COMPLETED | OUTPATIENT
Start: 2024-04-25 | End: 2024-04-25

## 2024-04-25 RX ADMIN — BUTALBITAL, ACETAMINOPHEN, AND CAFFEINE 1 TABLET: 325; 50; 40 TABLET ORAL at 09:04

## 2024-04-25 RX ADMIN — KETOROLAC TROMETHAMINE 30 MG: 30 INJECTION, SOLUTION INTRAMUSCULAR at 08:04

## 2024-04-25 RX ADMIN — TIZANIDINE 4 MG: 4 TABLET ORAL at 08:04

## 2024-04-26 NOTE — ED TRIAGE NOTES
Pt presents to ED c/o headache x 2 days.  Denies dizziness, blurry vision, n/v, cp, sob or any other symptoms.  Denies falls, head injury or any other injuries.

## 2024-04-26 NOTE — DISCHARGE INSTRUCTIONS
You were evaluated and treated in the emergency department today. You were found to have a diagnoses that can be managed well at home, however that requires your commitment to getting better.    Diagnoses specific instructions:  Follow-up with your primary care provider.  Return emergency room for any new or worsening symptoms.       If you received or are discharged with pain medicine or muscle relaxers, understand that they can make you sleepy or impair your judgement. Do not make important decisions, drink, drive, swim or perform any other tasks you would not otherwise perform while impaired for at least 24 hours after your last dose.      Ensure you follow up with your Primary Care Provider or any additional providers listed on this discharge sheet. While you may be healthy enough to go home today, I cannot predict the exact course of your diagnoses. It is important to remember that some problems are difficult to diagnose and may not be found during your first visit. As such, it is your responsibility to monitor symptoms, follow-up with another healthcare provider, or return to the emergency room for new or worsening concerns. Unless otherwise instructed, continue all home medications and any new medications prescribed to you in the Emergency Department.     General Maintenance for otherwise healthy adults: Ensure adequate hydration to prevent prolonged illness and recovery. This should include about 14-16 cups of water daily.  Monitor your caloric intake with a goal of obtaining and maintaining a healthy weight and BMI to help prevent the development of chronic and life-threatening medical conditions. Talk with your primary care provider about how to start healthy fitness habits and aim for a goal of 30 minutes to an hour of exercise 3-5 times a week. Avoid the use of tobacco, alcohol, and illicit drugs as these may be detrimental to your health goals.
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
## 2024-05-24 ENCOUNTER — HOSPITAL ENCOUNTER (EMERGENCY)
Facility: HOSPITAL | Age: 39
Discharge: HOME | End: 2024-05-24
Attending: EMERGENCY MEDICINE

## 2024-05-24 VITALS
HEART RATE: 68 BPM | OXYGEN SATURATION: 97 % | DIASTOLIC BLOOD PRESSURE: 79 MMHG | WEIGHT: 229.94 LBS | SYSTOLIC BLOOD PRESSURE: 131 MMHG | HEIGHT: 69 IN | TEMPERATURE: 97 F | BODY MASS INDEX: 34.06 KG/M2 | RESPIRATION RATE: 17 BRPM

## 2024-05-24 DIAGNOSIS — M54.9 BACK PAIN, UNSPECIFIED BACK LOCATION, UNSPECIFIED BACK PAIN LATERALITY, UNSPECIFIED CHRONICITY: Primary | ICD-10-CM

## 2024-05-24 LAB
ALBUMIN SERPL-MCNC: 4.1 G/DL (ref 3.3–5.5)
ALP SERPL-CCNC: 78 U/L (ref 42–141)
BILIRUB SERPL-MCNC: 0.5 MG/DL (ref 0.2–1.6)
BILIRUBIN, POC UA: NEGATIVE
BLOOD, POC UA: ABNORMAL
BUN SERPL-MCNC: 12 MG/DL (ref 7–22)
CALCIUM SERPL-MCNC: 10.3 MG/DL (ref 8–10.3)
CHLORIDE SERPL-SCNC: 103 MMOL/L (ref 98–108)
CLARITY, POC UA: CLEAR
COLOR, POC UA: YELLOW
CREAT SERPL-MCNC: 0.9 MG/DL (ref 0.6–1.2)
GLUCOSE SERPL-MCNC: 105 MG/DL (ref 73–118)
GLUCOSE, POC UA: NEGATIVE
HCT, POC: NORMAL
HGB, POC: NORMAL (ref 14–18)
KETONES, POC UA: NEGATIVE
LEUKOCYTE EST, POC UA: NEGATIVE
MCH, POC: NORMAL
MCHC, POC: NORMAL
MCV, POC: NORMAL
MPV, POC: NORMAL
NITRITE, POC UA: NEGATIVE
PH UR STRIP: 6 [PH]
POC ALT (SGPT): 19 U/L (ref 10–47)
POC AST (SGOT): 22 U/L (ref 11–38)
POC PLATELET COUNT: NORMAL
POC TCO2: 27 MMOL/L (ref 18–33)
POTASSIUM BLD-SCNC: 4.8 MMOL/L (ref 3.6–5.1)
PROTEIN, POC UA: NEGATIVE
PROTEIN, POC: 7.7 G/DL (ref 6.4–8.1)
RBC, POC: NORMAL
RDW, POC: NORMAL
SODIUM BLD-SCNC: 145 MMOL/L (ref 128–145)
SPECIFIC GRAVITY, POC UA: 1.02
UROBILINOGEN, POC UA: 0.2 E.U./DL
WBC, POC: NORMAL

## 2024-05-24 PROCEDURE — 99284 EMERGENCY DEPT VISIT MOD MDM: CPT | Mod: 25,ER

## 2024-05-24 PROCEDURE — 63600175 PHARM REV CODE 636 W HCPCS: Mod: ER

## 2024-05-24 PROCEDURE — 85025 COMPLETE CBC W/AUTO DIFF WBC: CPT | Mod: ER

## 2024-05-24 PROCEDURE — 80053 COMPREHEN METABOLIC PANEL: CPT | Mod: ER

## 2024-05-24 PROCEDURE — 96372 THER/PROPH/DIAG INJ SC/IM: CPT

## 2024-05-24 RX ORDER — METHOCARBAMOL 500 MG/1
1000 TABLET, FILM COATED ORAL 3 TIMES DAILY
Qty: 30 TABLET | Refills: 0 | Status: SHIPPED | OUTPATIENT
Start: 2024-05-24 | End: 2024-05-29

## 2024-05-24 RX ORDER — KETOROLAC TROMETHAMINE 30 MG/ML
30 INJECTION, SOLUTION INTRAMUSCULAR; INTRAVENOUS
Status: COMPLETED | OUTPATIENT
Start: 2024-05-24 | End: 2024-05-24

## 2024-05-24 RX ORDER — ACETAMINOPHEN 500 MG
500 TABLET ORAL EVERY 6 HOURS PRN
Qty: 28 TABLET | Refills: 0 | Status: SHIPPED | OUTPATIENT
Start: 2024-05-24 | End: 2024-05-31

## 2024-05-24 RX ORDER — NAPROXEN 500 MG/1
500 TABLET ORAL 2 TIMES DAILY WITH MEALS
Qty: 10 TABLET | Refills: 0 | Status: SHIPPED | OUTPATIENT
Start: 2024-05-24 | End: 2024-05-29

## 2024-05-24 RX ADMIN — KETOROLAC TROMETHAMINE 30 MG: 30 INJECTION, SOLUTION INTRAMUSCULAR at 06:05

## 2024-05-24 NOTE — DISCHARGE INSTRUCTIONS

## 2024-05-24 NOTE — ED NOTES
Patient identifiers verified and correct for Mr. Ruff  C/C: lower back pain  APPEARANCE: awake and alert in NAD.  SKIN: warm, dry and intact. No breakdown or bruising.  MUSCULOSKELETAL: lower back pain. Patient moving all extremities spontaneously, no obvious swelling or deformities noted. Ambulates independently.  RESPIRATORY: Denies shortness of breath.Respirations unlabored.   CARDIAC: Denies CP,  distal pulses; no peripheral edema  ABDOMEN: denies abdominal pain and n/v/d   : voids spontaneously, denies difficulty  Neurologic: AAO x 4; follows commands equal strength in all extremities; denies numbness/tingling. Denies dizziness

## 2024-05-24 NOTE — ED PROVIDER NOTES
Encounter Date: 5/24/2024       History     Chief Complaint   Patient presents with    Back Pain     Pt states back pain 4-5 days. Started while lying down. Pt denies injury.      Cristo Ruff is a 39-year-old male with no pertinent past medical history who presents to the emergency department with a chief complaint of back pain.  Back pain started 4-5 days ago.  He denies any inciting incident such as fall, trauma, heavy lifting, or change in activity.  The pain is mostly right-sided, aching in nature, and 5/10 severity.  He was also started having some pain on the left side of his back.  He notes increased urinary frequency but denies urgency, dysuria, or hematuria.  He denies fever, chills, chest pain, shortness of breath.  He was a family history of kidney disorders including a brother on dialysis, so he was concerned for kidney issues.    The history is provided by the patient. No  was used.     Review of patient's allergies indicates:  No Known Allergies  Past Medical History:   Diagnosis Date    A-fib     Gastric ulcer due to Helicobacter pylori     Gastric ulcer, acute     GERD (gastroesophageal reflux disease)     Hyperlipemia     Hypertension     Paroxysmal atrial fibrillation     Sleep apnea, unspecified      Past Surgical History:   Procedure Laterality Date    HERNIA REPAIR       Family History   Problem Relation Name Age of Onset    Hypertension Mother      Diabetes Mellitus Mother      Kidney failure Mother      Hypertension Father      Diabetes Mellitus Father      Hypertension Sister       Social History     Tobacco Use    Smoking status: Former    Smokeless tobacco: Never   Substance Use Topics    Alcohol use: Yes     Comment: occasionally    Drug use: Not Currently     Types: Marijuana     Comment: Daily use     Review of Systems   Constitutional:  Negative for chills and fever.   HENT:  Negative for congestion and sore throat.    Respiratory:  Negative for cough, shortness  of breath and wheezing.    Cardiovascular:  Negative for chest pain and palpitations.   Gastrointestinal:  Negative for nausea and vomiting.   Genitourinary:  Positive for frequency. Negative for dysuria, flank pain, hematuria and urgency.   Musculoskeletal:  Positive for back pain. Negative for gait problem and neck pain.   Skin:  Negative for rash.   Neurological:  Negative for seizures, syncope, weakness, numbness and headaches.   Hematological:  Does not bruise/bleed easily.       Physical Exam     Initial Vitals [05/24/24 1521]   BP Pulse Resp Temp SpO2   122/78 80 18 98.1 °F (36.7 °C) 97 %      MAP       --         Physical Exam    Nursing note and vitals reviewed.  Constitutional: Vital signs are normal. He appears well-developed and well-nourished. He is cooperative. He does not appear ill. No distress.   HENT:   Head: Normocephalic and atraumatic.   Right Ear: External ear normal.   Left Ear: External ear normal.   Nose: Nose normal.   Eyes: Conjunctivae and EOM are normal.   Neck: Phonation normal.   Normal range of motion.  Cardiovascular:  Normal rate and regular rhythm.           No murmur heard.  Pulmonary/Chest: Effort normal. No respiratory distress.   Abdominal: Abdomen is flat. He exhibits no distension. There is no abdominal tenderness.   Musculoskeletal:      Cervical back: Normal range of motion.      Comments: Spine palpated from the occiput to just above the sacrum.  No midline bony tenderness.  There is mild tenderness to palpation bilaterally in the paraspinal musculature in the lower thoracic back, worse on the right than the left.  No overlying skin changes including rash, erythema, or warmth.     Neurological: He is alert and oriented to person, place, and time. GCS eye subscore is 4. GCS verbal subscore is 5. GCS motor subscore is 6.   Normal neurological exam with no focal neurological deficits.  Motor function and sensation are present and symmetrical in bilateral upper and lower  extremities.   Skin: Skin is warm and dry. Capillary refill takes less than 2 seconds. No rash noted.         ED Course   Procedures  Labs Reviewed   POCT URINALYSIS W/O SCOPE - Abnormal; Notable for the following components:       Result Value    Blood, UA Trace-intact (*)     All other components within normal limits   POCT CBC   POCT URINALYSIS W/O SCOPE   POCT CMP   POCT CMP          Imaging Results    None          Medications   ketorolac injection 30 mg (30 mg Intramuscular Given 5/24/24 1805)     Medical Decision Making  39-year-old male presenting to the emergency department with a chief complaint of atraumatic back pain.  The only other symptom he has been experiencing is increased urinary frequency.  Denies any known inciting incident.  Denies dysuria, hematuria, abdominal pain, chest pain, shortness of breath.  On physical exam, he was well-appearing and in no acute distress.  Vital signs are within normal limits.  Mild tenderness to palpation in the paraspinal musculature of the lower thoracic back that is worse on the right than the left. The rest of the physical exam is without concerning findings.    Differential diagnosis includes but is not limited to lumbago with or without sciatica, low back strain, contusion, fracture, compression fracture, dislocation, cauda equina syndrome, spinal epidural abscess, spinal epidural hematoma.     No red flags for cauda equina such as incontinence, urinary retention, saddle anesthesia, weakness, numbness, or neurological deficit.  No red flags for infection including fever, IV drug use, or immunocompromised state.  No red flags concerning for cancer such as weight loss, night sweats, pain worse at rest, or spinal tenderness.  Also considered but doubt vertebral fracture, aortic dissection, aortic aneurysm, herniated disc.  Presentation is consistent with thoracic back strain.  Urinalysis with trace blood.  CBC and CMP are both within normal limits.  Despite blood  on urinalysis, I have a low suspicion for urolithiasis at this time.  Patient was description of symptoms is not consistent with urolithiasis.  Pain is now bilateral, which makes renal etiology of patient's pain less likely.  I will treat as a back strain.  Return precautions were discussed at length and in detail.  Toradol in the emergency department for acute pain control.    Return precautions were discussed, all patient questions were answered, and the patient was agreeable to the plan of care.  He was discharged home in stable condition and will follow up with his primary care provider or return to the emergency department if his symptoms worsen or do not improve.     Amount and/or Complexity of Data Reviewed  Labs: ordered.    Risk  Prescription drug management.                                      Clinical Impression:  Final diagnoses:  [M54.9] Back pain, unspecified back location, unspecified back pain laterality, unspecified chronicity (Primary)          ED Disposition Condition    Discharge Stable          ED Prescriptions       Medication Sig Dispense Start Date End Date Auth. Provider    acetaminophen (TYLENOL) 500 MG tablet Take 1 tablet (500 mg total) by mouth every 6 (six) hours as needed. 28 tablet 5/24/2024 5/31/2024 Talib Mccauley PA-C    naproxen (NAPROSYN) 500 MG tablet Take 1 tablet (500 mg total) by mouth 2 (two) times daily with meals. for 5 days 10 tablet 5/24/2024 5/29/2024 Talib Mccauley PA-C    methocarbamoL (ROBAXIN) 500 MG Tab Take 2 tablets (1,000 mg total) by mouth 3 (three) times daily. for 5 days 30 tablet 5/24/2024 5/29/2024 Talib Mccauley PA-C          Follow-up Information       Follow up With Specialties Details Why Contact Info    Records, [facility]  Schedule an appointment as soon as possible for a visit  As needed [address] [address]  [phone]      [facility] ED Emergency Medicine Go to  If symptoms worsen  [address] [address]  [phone]             Talib Mccauley, PA-C  05/24/24 4054

## 2024-06-08 ENCOUNTER — HOSPITAL ENCOUNTER (EMERGENCY)
Facility: HOSPITAL | Age: 39
Discharge: HOME | End: 2024-06-08
Attending: EMERGENCY MEDICINE

## 2024-06-08 VITALS
RESPIRATION RATE: 17 BRPM | OXYGEN SATURATION: 98 % | WEIGHT: 234 LBS | SYSTOLIC BLOOD PRESSURE: 147 MMHG | TEMPERATURE: 98 F | BODY MASS INDEX: 34.56 KG/M2 | DIASTOLIC BLOOD PRESSURE: 83 MMHG | HEART RATE: 68 BPM

## 2024-06-08 DIAGNOSIS — S90.821A INFECTED BLISTER OF RIGHT FOOT, INITIAL ENCOUNTER: Primary | ICD-10-CM

## 2024-06-08 DIAGNOSIS — L08.9 INFECTED BLISTER OF RIGHT FOOT, INITIAL ENCOUNTER: Primary | ICD-10-CM

## 2024-06-08 PROCEDURE — 99284 EMERGENCY DEPT VISIT MOD MDM: CPT | Mod: ER

## 2024-06-08 RX ORDER — CEPHALEXIN 500 MG/1
500 CAPSULE ORAL EVERY 6 HOURS
Qty: 28 CAPSULE | Refills: 0 | Status: SHIPPED | OUTPATIENT
Start: 2024-06-08 | End: 2024-06-15

## 2024-06-08 RX ORDER — NAPROXEN 500 MG/1
500 TABLET ORAL EVERY 12 HOURS PRN
Qty: 20 TABLET | Refills: 0 | Status: SHIPPED | OUTPATIENT
Start: 2024-06-08

## 2024-06-08 NOTE — ED PROVIDER NOTES
"Encounter Date: 6/8/2024    SCRIBE #1 NOTE: I, Cezar Baltazar, am scribing for, and in the presence of,  Valdez Kumar NP. I have scribed the following portions of the note - Other sections scribed: HPI,ROS.       History     Chief Complaint   Patient presents with    Blister     C/o blisters to right foot x 3 days.      Cristo Ruff is a 39 y.o. male, with a PMHx of atrial fibrillation, HLD and HTN, who presents to the ED with areas of pain and swelling to right foot onset 1 day ago. Patient reports wearing boots prior to his symptoms occurring. Patient states that 1 day ago while wearing his normal work shoes he began to feel pain in his foot. Patient reports that today while at work,"I could hardly walk" due to the pain. Patient notes that he has never seen a podiatrist. No other exacerbating or alleviating factors.    The history is provided by the patient. No  was used.     Review of patient's allergies indicates:  No Known Allergies  Past Medical History:   Diagnosis Date    A-fib     Gastric ulcer due to Helicobacter pylori     Gastric ulcer, acute     GERD (gastroesophageal reflux disease)     Hyperlipemia     Hypertension     Paroxysmal atrial fibrillation     Sleep apnea, unspecified      Past Surgical History:   Procedure Laterality Date    HERNIA REPAIR       Family History   Problem Relation Name Age of Onset    Hypertension Mother      Diabetes Mellitus Mother      Kidney failure Mother      Hypertension Father      Diabetes Mellitus Father      Hypertension Sister       Social History     Tobacco Use    Smoking status: Former    Smokeless tobacco: Never   Substance Use Topics    Alcohol use: Yes     Comment: occasionally    Drug use: Not Currently     Types: Marijuana     Comment: Daily use     Review of Systems   Constitutional:  Negative for fever.   HENT:  Negative for sore throat.    Eyes:  Negative for visual disturbance.   Respiratory:  Negative for shortness " of breath.    Cardiovascular:  Negative for chest pain.   Gastrointestinal:  Negative for diarrhea and vomiting.   Genitourinary:  Negative for dysuria.   Musculoskeletal:  Positive for myalgias.   Skin:  Negative for rash.   Neurological:  Negative for syncope.       Physical Exam     Initial Vitals [06/08/24 1706]   BP Pulse Resp Temp SpO2   (!) 147/83 68 17 97.8 °F (36.6 °C) 98 %      MAP       --         Physical Exam    Nursing note and vitals reviewed.  Constitutional: He appears well-developed and well-nourished. He is not diaphoretic. No distress.   HENT:   Head: Normocephalic and atraumatic.   Right Ear: External ear normal.   Left Ear: External ear normal.   Nose: Nose normal.   Eyes: EOM are normal. Right eye exhibits no discharge. Left eye exhibits no discharge.   Neck: Neck supple. No tracheal deviation present.   Normal range of motion.  Cardiovascular:  Normal rate.           Pulmonary/Chest: No stridor. No respiratory distress.   Abdominal: Abdomen is soft. He exhibits no distension. There is no abdominal tenderness.   Musculoskeletal:         General: No tenderness. Normal range of motion.      Cervical back: Normal range of motion and neck supple.     Neurological: He is alert and oriented to person, place, and time. He has normal strength. No cranial nerve deficit.   Skin: Skin is warm and dry.     Several vesicular fluid-filled blisters to the medial right forefoot and distal right 2nd toe   Psychiatric: He has a normal mood and affect. His behavior is normal. Judgment and thought content normal.         ED Course   Procedures  Labs Reviewed - No data to display       Imaging Results    None          Medications - No data to display  Medical Decision Making  Risk  Prescription drug management.            Scribe Attestation:   Scribe #1: I performed the above scribed service and the documentation accurately describes the services I performed. I attest to the accuracy of the note.              I,  Valdez Kumar NP, personally performed the services described in this documentation.  All medical record entries made by the scribe were at my direction and in my presence.  I have reviewed the chart and agree that the record reflects my personal performance and is accurate and complete.                   Clinical Impression:  Final diagnoses:  [S90.821A, L08.9] Infected blister of right foot, initial encounter (Primary)          ED Disposition Condition    Discharge Stable          ED Prescriptions       Medication Sig Dispense Start Date End Date Auth. Provider    cephALEXin (KEFLEX) 500 MG capsule Take 1 capsule (500 mg total) by mouth every 6 (six) hours. for 7 days 28 capsule 6/8/2024 6/15/2024 Valdez Kumar NP    naproxen (NAPROSYN) 500 MG tablet Take 1 tablet (500 mg total) by mouth every 12 (twelve) hours as needed (Pain). 20 tablet 6/8/2024 -- Valdez Kumar NP          Follow-up Information       Follow up With Specialties Details Why Contact Info    Gemma Simmons, DPAUBREE Podiatry, Wound Care Schedule an appointment as soon as possible for a visit in 1 week For further evaluation [address] [facility] [address]  [phone]      [facility] ED Emergency Medicine Go to  If symptoms worsen, As needed [address] [facility] [address] [phone]             Valdez Kumar NP  06/08/24 2042

## 2024-06-08 NOTE — DISCHARGE INSTRUCTIONS
Take antibiotics as prescribed until they are gone.  You should not have any left over even if your symptoms get better.      Follow up with Podiatry.  Return to the emergency department for any new or worsening symptoms.    Thank you for coming to our Emergency Department today. It is important to remember that some problems are difficult to diagnose and may not be found during your first visit. Be sure to follow up with your primary care doctor.  If you do not have one, you may contact the one listed on your discharge paperwork or you may also call the [facility] Appointment Desk at [phone] to schedule an appointment with one.     Return to the ER with any questions/concerns, new/concerning symptoms, worsening or failure to improve. Do not drive or make any important decisions for 24 hours if you have received any pain medications, sedatives or mood altering drugs during your ER visit.

## 2024-06-22 ENCOUNTER — HOSPITAL ENCOUNTER (EMERGENCY)
Facility: HOSPITAL | Age: 39
Discharge: HOME | End: 2024-06-23
Attending: STUDENT IN AN ORGANIZED HEALTH CARE EDUCATION/TRAINING PROGRAM

## 2024-06-22 DIAGNOSIS — R07.89 CHEST WALL PAIN: Primary | ICD-10-CM

## 2024-06-22 DIAGNOSIS — R07.9 CHEST PAIN: ICD-10-CM

## 2024-06-22 LAB
ALBUMIN SERPL BCP-MCNC: 4 G/DL (ref 3.5–5.2)
ALP SERPL-CCNC: 77 U/L (ref 55–135)
ALT SERPL W/O P-5'-P-CCNC: 17 U/L (ref 10–44)
ANION GAP SERPL CALC-SCNC: 8 MMOL/L (ref 8–16)
AST SERPL-CCNC: 23 U/L (ref 10–40)
BASOPHILS # BLD AUTO: 0.07 K/UL (ref 0–0.2)
BASOPHILS NFR BLD: 0.7 % (ref 0–1.9)
BILIRUB SERPL-MCNC: 0.3 MG/DL (ref 0.1–1)
BNP SERPL-MCNC: <10 PG/ML (ref 0–99)
BUN SERPL-MCNC: 11 MG/DL (ref 6–20)
CALCIUM SERPL-MCNC: 9.7 MG/DL (ref 8.7–10.5)
CHLORIDE SERPL-SCNC: 107 MMOL/L (ref 95–110)
CO2 SERPL-SCNC: 27 MMOL/L (ref 23–29)
CREAT SERPL-MCNC: 1.2 MG/DL (ref 0.5–1.4)
DIFFERENTIAL METHOD BLD: ABNORMAL
EOSINOPHIL # BLD AUTO: 0.5 K/UL (ref 0–0.5)
EOSINOPHIL NFR BLD: 4.9 % (ref 0–8)
ERYTHROCYTE [DISTWIDTH] IN BLOOD BY AUTOMATED COUNT: 15.1 % (ref 11.5–14.5)
EST. GFR  (NO RACE VARIABLE): >60 ML/MIN/1.73 M^2
GLUCOSE SERPL-MCNC: 101 MG/DL (ref 70–110)
HCT VFR BLD AUTO: 41.1 % (ref 40–54)
HGB BLD-MCNC: 13.1 G/DL (ref 14–18)
IMM GRANULOCYTES # BLD AUTO: 0.02 K/UL (ref 0–0.04)
IMM GRANULOCYTES NFR BLD AUTO: 0.2 % (ref 0–0.5)
LYMPHOCYTES # BLD AUTO: 3.2 K/UL (ref 1–4.8)
LYMPHOCYTES NFR BLD: 33.4 % (ref 18–48)
MCH RBC QN AUTO: 26 PG (ref 27–31)
MCHC RBC AUTO-ENTMCNC: 31.9 G/DL (ref 32–36)
MCV RBC AUTO: 82 FL (ref 82–98)
MONOCYTES # BLD AUTO: 0.9 K/UL (ref 0.3–1)
MONOCYTES NFR BLD: 9.3 % (ref 4–15)
NEUTROPHILS # BLD AUTO: 5 K/UL (ref 1.8–7.7)
NEUTROPHILS NFR BLD: 51.5 % (ref 38–73)
NRBC BLD-RTO: 0 /100 WBC
PLATELET # BLD AUTO: 244 K/UL (ref 150–450)
PMV BLD AUTO: 11.2 FL (ref 9.2–12.9)
POTASSIUM SERPL-SCNC: 4.4 MMOL/L (ref 3.5–5.1)
PROT SERPL-MCNC: 7.8 G/DL (ref 6–8.4)
RBC # BLD AUTO: 5.03 M/UL (ref 4.6–6.2)
SODIUM SERPL-SCNC: 142 MMOL/L (ref 136–145)
TROPONIN I SERPL DL<=0.01 NG/ML-MCNC: <0.006 NG/ML (ref 0–0.03)
WBC # BLD AUTO: 9.71 K/UL (ref 3.9–12.7)

## 2024-06-22 PROCEDURE — 80053 COMPREHEN METABOLIC PANEL: CPT | Performed by: STUDENT IN AN ORGANIZED HEALTH CARE EDUCATION/TRAINING PROGRAM

## 2024-06-22 PROCEDURE — 84484 ASSAY OF TROPONIN QUANT: CPT | Performed by: STUDENT IN AN ORGANIZED HEALTH CARE EDUCATION/TRAINING PROGRAM

## 2024-06-22 PROCEDURE — 85025 COMPLETE CBC W/AUTO DIFF WBC: CPT | Performed by: STUDENT IN AN ORGANIZED HEALTH CARE EDUCATION/TRAINING PROGRAM

## 2024-06-22 PROCEDURE — 93005 ELECTROCARDIOGRAM TRACING: CPT

## 2024-06-22 PROCEDURE — 83880 ASSAY OF NATRIURETIC PEPTIDE: CPT | Performed by: STUDENT IN AN ORGANIZED HEALTH CARE EDUCATION/TRAINING PROGRAM

## 2024-06-22 PROCEDURE — 25000003 PHARM REV CODE 250: Performed by: STUDENT IN AN ORGANIZED HEALTH CARE EDUCATION/TRAINING PROGRAM

## 2024-06-22 PROCEDURE — 99285 EMERGENCY DEPT VISIT HI MDM: CPT | Mod: 25

## 2024-06-22 RX ORDER — ASPIRIN 325 MG
325 TABLET ORAL
Status: COMPLETED | OUTPATIENT
Start: 2024-06-22 | End: 2024-06-22

## 2024-06-22 RX ORDER — NAPROXEN 500 MG/1
500 TABLET ORAL EVERY 12 HOURS PRN
Qty: 14 TABLET | Refills: 0 | Status: SHIPPED | OUTPATIENT
Start: 2024-06-22 | End: 2024-06-29

## 2024-06-22 RX ORDER — METHOCARBAMOL 500 MG/1
1000 TABLET, FILM COATED ORAL EVERY 8 HOURS PRN
Qty: 30 TABLET | Refills: 0 | Status: SHIPPED | OUTPATIENT
Start: 2024-06-22 | End: 2024-06-27

## 2024-06-22 RX ADMIN — ASPIRIN 325 MG ORAL TABLET 325 MG: 325 PILL ORAL at 10:06

## 2024-06-23 VITALS
WEIGHT: 224 LBS | TEMPERATURE: 99 F | RESPIRATION RATE: 12 BRPM | OXYGEN SATURATION: 97 % | HEART RATE: 66 BPM | BODY MASS INDEX: 33.08 KG/M2 | SYSTOLIC BLOOD PRESSURE: 126 MMHG | DIASTOLIC BLOOD PRESSURE: 85 MMHG

## 2024-06-23 NOTE — ED TRIAGE NOTES
Pt arrived to ED with c/o left sided chest pain since morning. Describes it was pressure and radiates to his left arm. States he took baby aspirin. Denies any SOB, nausea, vomiting, sweating. Pt is alert and oriented. V/S within normal limits.

## 2024-06-23 NOTE — ED PROVIDER NOTES
Encounter Date: 6/22/2024    SCRIBE #1 NOTE: I, YAMILETH Gonzalez, am scribing for, and in the presence of,  Grover Acevedo MD. I have scribed the following portions of the note - Other sections scribed: HPI, ROS, PE.       History     Chief Complaint   Patient presents with    Chest Pain     Started with left sided chest pain after reaching to put something on top of the fridge, also with left arm pain all day     Cristo Ruff is a 39 y.o. male, with a PMHx of HTN, HLD, and Afib, who presents to the ED with sharp, left sided chest pain which started this morning. Associated symptoms include left arm pain, diarrhea, and a chronic productive cough. Patient reports he has done some slight heavy lifting recently. No other exacerbating or alleviating factors. Denies SOB, nausea, vomiting, or other associated symptoms.       The history is provided by the patient. No  was used.     Review of patient's allergies indicates:  No Known Allergies  Past Medical History:   Diagnosis Date    A-fib     Gastric ulcer due to Helicobacter pylori     Gastric ulcer, acute     GERD (gastroesophageal reflux disease)     Hyperlipemia     Hypertension     Paroxysmal atrial fibrillation     Sleep apnea, unspecified      Past Surgical History:   Procedure Laterality Date    HERNIA REPAIR       Family History   Problem Relation Name Age of Onset    Hypertension Mother      Diabetes Mellitus Mother      Kidney failure Mother      Hypertension Father      Diabetes Mellitus Father      Hypertension Sister       Social History     Tobacco Use    Smoking status: Former    Smokeless tobacco: Never   Substance Use Topics    Alcohol use: Yes     Comment: occasionally    Drug use: Not Currently     Types: Marijuana     Comment: Daily use     Review of Systems   Constitutional:  Negative for chills and fever.   HENT:  Negative for facial swelling and sore throat.    Eyes:  Negative for visual disturbance.   Respiratory:   Positive for cough. Negative for shortness of breath.    Cardiovascular:  Positive for chest pain. Negative for palpitations.   Gastrointestinal:  Positive for diarrhea. Negative for abdominal pain, blood in stool, nausea and vomiting.   Genitourinary:  Negative for dysuria and hematuria.   Musculoskeletal:  Positive for myalgias (left arm). Negative for back pain.   Skin:  Negative for rash.   Neurological:  Negative for weakness and headaches.   Hematological:  Does not bruise/bleed easily.   Psychiatric/Behavioral: Negative.         Physical Exam     Initial Vitals [06/22/24 2142]   BP Pulse Resp Temp SpO2   138/85 75 16 99.2 °F (37.3 °C) 97 %      MAP       --         Physical Exam    Nursing note and vitals reviewed.  Constitutional: He appears well-developed and well-nourished. He is not diaphoretic. No distress.   HENT:   Head: Normocephalic and atraumatic.   Right Ear: External ear normal.   Left Ear: External ear normal.   Nose: Nose normal.   Eyes: Conjunctivae are normal. No scleral icterus.   Neck: Neck supple. No tracheal deviation present.   Cardiovascular:  Normal rate, regular rhythm and normal heart sounds.     Exam reveals no gallop and no friction rub.       No murmur heard.  Pulmonary/Chest: Breath sounds normal. No respiratory distress.   Tenderness with palpation to sternum   Abdominal: Abdomen is soft. Bowel sounds are normal. There is no abdominal tenderness.   Musculoskeletal:      Cervical back: Neck supple.     Neurological: He is alert and oriented to person, place, and time. GCS score is 15. GCS eye subscore is 4. GCS verbal subscore is 5. GCS motor subscore is 6.   Skin: Skin is warm and dry.   Psychiatric: He has a normal mood and affect. Thought content normal.         ED Course   Procedures  Labs Reviewed   CBC W/ AUTO DIFFERENTIAL - Abnormal; Notable for the following components:       Result Value    Hemoglobin 13.1 (*)     MCH 26.0 (*)     MCHC 31.9 (*)     RDW 15.1 (*)     All  "other components within normal limits   COMPREHENSIVE METABOLIC PANEL   TROPONIN I   B-TYPE NATRIURETIC PEPTIDE          Imaging Results              X-Ray Chest PA And Lateral (Final result)  Result time 06/22/24 23:31:54      Final result by Jerrod Paul MD (06/22/24 23:31:54)                   Impression:      No acute cardiopulmonary finding.      Electronically signed by: Jerrod Paul MD  Date:    06/22/2024  Time:    23:31               Narrative:    EXAMINATION:  XR CHEST PA AND LATERAL    CLINICAL HISTORY:  Provided history is "Chest Pain;  ".    TECHNIQUE:  Frontal and lateral views of the chest were performed.    COMPARISON:  04/21/2024.    FINDINGS:  Cardiac silhouette is not enlarged. Mild elevation of the left hemidiaphragm.  No focal consolidation.  No sizable pleural effusion.  No pneumothorax.                                       Medications   aspirin tablet 325 mg (325 mg Oral Given 6/22/24 2254)     Medical Decision Making  Amount and/or Complexity of Data Reviewed  Labs: ordered. Decision-making details documented in ED Course.  Radiology: ordered and independent interpretation performed. Decision-making details documented in ED Course.  ECG/medicine tests: independent interpretation performed. Decision-making details documented in ED Course.    Risk  OTC drugs.  Prescription drug management.      Additional MDM:   Heart Score:    History:          Slightly suspicious.  ECG:             Normal  Age:               Less than 45 years  Risk factors: 1-2 risk factors  Troponin:       Less than or equal to normal limit  Heart Score = 1        39-year-old presenting to the emergency department for evaluation of anterior chest wall pain.  He noted it occurred after lifting his left arm up for something in his kitchen.  Reproducible on exam.  EKG is nonischemic, troponin is normal, other laboratory workup is unremarkable.  No electrolyte derangement.  Chest x-ray without any acute " cardiopulmonary findings.  Heart score of 1.  Plan to treat with NSAIDs and muscle relaxers and have patient rest the area for 1 week.  Counseled no heavy lifting.  Doubt cardiac etiology.  Strict return precautions given. I discussed with the patient/family the diagnosis, treatment plan, indications for return to the emergency department, and for expected follow-up. The patient/family verbalized an understanding. The patient/family  asked if there are any questions or concerns. We discuss the case, until all issues are addressed to the patient/family's satisfaction. Patient/family understands and is agreeable to the plan. Patient is stable and ready for discharge.       Scribe Attestation:   Scribe #1: I performed the above scribed service and the documentation accurately describes the services I performed. I attest to the accuracy of the note.        ED Course as of 06/22/24 2348   Sat Jun 22, 2024 2145 EKG: Rate 71, regular rhythm, sinus rhythm, intervals within normal limits, no ST elevations or depressions noted.  Normal EKG.  Normal sinus rhythm.  Interpreted by me, reviewed by me. [CC]   2336 X-Ray Chest PA And Lateral  Independent chest x-ray evaluation in interpretation.  No acute cardiopulmonary process noted. [CC]      ED Course User Index  [CC] Grover Acevedo MD                         I, Grover Acevedo MD, personally performed the services described in this documentation.  All medical record entries made by the scribe were at my direction and in my presence.  I have reviewed the chart and agree that the record reflects my personal performance and is accurate and complete.    Clinical Impression:  Final diagnoses:  [R07.9] Chest pain  [R07.89] Chest wall pain (Primary)          ED Disposition Condition    Discharge Stable          ED Prescriptions       Medication Sig Dispense Start Date End Date Auth. Provider    methocarbamoL (ROBAXIN) 500 MG Tab Take 2 tablets (1,000 mg total) by mouth  every 8 (eight) hours as needed (pain). 30 tablet 6/22/2024 6/27/2024 Grover Acevedo MD    naproxen (NAPROSYN) 500 MG tablet Take 1 tablet (500 mg total) by mouth every 12 (twelve) hours as needed (pain). Take with meals. 14 tablet 6/22/2024 6/29/2024 Grover Acevedo MD          Follow-up Information       Follow up With Specialties Details Why Contact Info    Records, [facility]  Schedule an appointment as soon as possible for a visit in 2 days  [address] [facility] [address]  [phone]      [facility] Emergency Dept Emergency Medicine Go to   [address] [facility] [place] [address] [phone]             Grover Acevedo MD  06/22/24 5145       Grover Acevedo MD  06/22/24 8944

## 2024-06-25 LAB
OHS QRS DURATION: 90 MS
OHS QTC CALCULATION: 371 MS

## 2024-08-11 ENCOUNTER — HOSPITAL ENCOUNTER (EMERGENCY)
Facility: HOSPITAL | Age: 39
Discharge: HOME | End: 2024-08-11
Attending: EMERGENCY MEDICINE
Payer: MEDICAID

## 2024-08-11 VITALS
BODY MASS INDEX: 33.33 KG/M2 | TEMPERATURE: 98 F | RESPIRATION RATE: 20 BRPM | HEART RATE: 68 BPM | WEIGHT: 225 LBS | OXYGEN SATURATION: 97 % | HEIGHT: 69 IN | SYSTOLIC BLOOD PRESSURE: 119 MMHG | DIASTOLIC BLOOD PRESSURE: 80 MMHG

## 2024-08-11 DIAGNOSIS — G44.209 TENSION HEADACHE: Primary | ICD-10-CM

## 2024-08-11 DIAGNOSIS — M54.50 RIGHT-SIDED LOW BACK PAIN WITHOUT SCIATICA, UNSPECIFIED CHRONICITY: ICD-10-CM

## 2024-08-11 PROCEDURE — 96372 THER/PROPH/DIAG INJ SC/IM: CPT | Performed by: EMERGENCY MEDICINE

## 2024-08-11 PROCEDURE — 99285 EMERGENCY DEPT VISIT HI MDM: CPT | Mod: 25,ER

## 2024-08-11 PROCEDURE — 63600175 PHARM REV CODE 636 W HCPCS: Mod: ER | Performed by: EMERGENCY MEDICINE

## 2024-08-11 RX ORDER — KETOROLAC TROMETHAMINE 30 MG/ML
30 INJECTION, SOLUTION INTRAMUSCULAR; INTRAVENOUS
Status: COMPLETED | OUTPATIENT
Start: 2024-08-11 | End: 2024-08-11

## 2024-08-11 RX ORDER — METHOCARBAMOL 750 MG/1
1500 TABLET, FILM COATED ORAL 3 TIMES DAILY
Qty: 30 TABLET | Refills: 0 | Status: SHIPPED | OUTPATIENT
Start: 2024-08-11 | End: 2024-08-16

## 2024-08-11 RX ORDER — KETOROLAC TROMETHAMINE 10 MG/1
10 TABLET, FILM COATED ORAL
Qty: 15 TABLET | Refills: 0 | Status: SHIPPED | OUTPATIENT
Start: 2024-08-11 | End: 2024-08-16

## 2024-08-11 RX ORDER — ORPHENADRINE CITRATE 30 MG/ML
60 INJECTION INTRAMUSCULAR; INTRAVENOUS
Status: COMPLETED | OUTPATIENT
Start: 2024-08-11 | End: 2024-08-11

## 2024-08-11 RX ADMIN — KETOROLAC TROMETHAMINE 30 MG: 30 INJECTION, SOLUTION INTRAMUSCULAR at 10:08

## 2024-08-11 RX ADMIN — ORPHENADRINE CITRATE 60 MG: 60 INJECTION INTRAMUSCULAR; INTRAVENOUS at 10:08
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
## 2024-08-11 NOTE — DISCHARGE INSTRUCTIONS
Take the new prescriptions as directed. You may continue your other medications. Follow up with your doctors and PT as scheduled.

## 2024-08-12 ENCOUNTER — HOSPITAL ENCOUNTER (EMERGENCY)
Facility: HOSPITAL | Age: 39
Discharge: HOME | End: 2024-08-12
Attending: STUDENT IN AN ORGANIZED HEALTH CARE EDUCATION/TRAINING PROGRAM
Payer: MEDICAID

## 2024-08-12 VITALS
TEMPERATURE: 98 F | RESPIRATION RATE: 20 BRPM | HEIGHT: 69 IN | DIASTOLIC BLOOD PRESSURE: 64 MMHG | OXYGEN SATURATION: 100 % | WEIGHT: 214 LBS | BODY MASS INDEX: 31.7 KG/M2 | SYSTOLIC BLOOD PRESSURE: 111 MMHG | HEART RATE: 71 BPM

## 2024-08-12 DIAGNOSIS — F41.9 ANXIETY: Primary | ICD-10-CM

## 2024-08-12 DIAGNOSIS — R07.9 CHEST PAIN: ICD-10-CM

## 2024-08-12 LAB
ALBUMIN SERPL BCP-MCNC: 4 G/DL (ref 3.5–5.2)
ALP SERPL-CCNC: 71 U/L (ref 55–135)
ALT SERPL W/O P-5'-P-CCNC: 29 U/L (ref 10–44)
ANION GAP SERPL CALC-SCNC: 9 MMOL/L (ref 8–16)
AST SERPL-CCNC: 17 U/L (ref 10–40)
BASOPHILS # BLD AUTO: 0.06 K/UL (ref 0–0.2)
BASOPHILS NFR BLD: 1 % (ref 0–1.9)
BILIRUB SERPL-MCNC: 0.3 MG/DL (ref 0.1–1)
BNP SERPL-MCNC: 14 PG/ML (ref 0–99)
BUN SERPL-MCNC: 9 MG/DL (ref 6–20)
CALCIUM SERPL-MCNC: 9.5 MG/DL (ref 8.7–10.5)
CHLORIDE SERPL-SCNC: 108 MMOL/L (ref 95–110)
CO2 SERPL-SCNC: 24 MMOL/L (ref 23–29)
CREAT SERPL-MCNC: 1 MG/DL (ref 0.5–1.4)
DIFFERENTIAL METHOD BLD: ABNORMAL
EOSINOPHIL # BLD AUTO: 0.1 K/UL (ref 0–0.5)
EOSINOPHIL NFR BLD: 1 % (ref 0–8)
ERYTHROCYTE [DISTWIDTH] IN BLOOD BY AUTOMATED COUNT: 15.5 % (ref 11.5–14.5)
EST. GFR  (NO RACE VARIABLE): >60 ML/MIN/1.73 M^2
GLUCOSE SERPL-MCNC: 98 MG/DL (ref 70–110)
HCT VFR BLD AUTO: 37.5 % (ref 40–54)
HGB BLD-MCNC: 11.7 G/DL (ref 14–18)
IMM GRANULOCYTES # BLD AUTO: 0.02 K/UL (ref 0–0.04)
IMM GRANULOCYTES NFR BLD AUTO: 0.3 % (ref 0–0.5)
LYMPHOCYTES # BLD AUTO: 2.2 K/UL (ref 1–4.8)
LYMPHOCYTES NFR BLD: 35.4 % (ref 18–48)
MCH RBC QN AUTO: 26.3 PG (ref 27–31)
MCHC RBC AUTO-ENTMCNC: 31.2 G/DL (ref 32–36)
MCV RBC AUTO: 84 FL (ref 82–98)
MONOCYTES # BLD AUTO: 0.6 K/UL (ref 0.3–1)
MONOCYTES NFR BLD: 9.9 % (ref 4–15)
NEUTROPHILS # BLD AUTO: 3.3 K/UL (ref 1.8–7.7)
NEUTROPHILS NFR BLD: 52.4 % (ref 38–73)
NRBC BLD-RTO: 0 /100 WBC
OHS QRS DURATION: 82 MS
OHS QTC CALCULATION: 374 MS
PLATELET # BLD AUTO: 264 K/UL (ref 150–450)
PMV BLD AUTO: 10.7 FL (ref 9.2–12.9)
POTASSIUM SERPL-SCNC: 3.8 MMOL/L (ref 3.5–5.1)
PROT SERPL-MCNC: 7.5 G/DL (ref 6–8.4)
RBC # BLD AUTO: 4.45 M/UL (ref 4.6–6.2)
SODIUM SERPL-SCNC: 141 MMOL/L (ref 136–145)
TROPONIN I SERPL DL<=0.01 NG/ML-MCNC: <0.006 NG/ML (ref 0–0.03)
WBC # BLD AUTO: 6.25 K/UL (ref 3.9–12.7)

## 2024-08-12 PROCEDURE — 85025 COMPLETE CBC W/AUTO DIFF WBC: CPT | Performed by: STUDENT IN AN ORGANIZED HEALTH CARE EDUCATION/TRAINING PROGRAM

## 2024-08-12 PROCEDURE — 84484 ASSAY OF TROPONIN QUANT: CPT | Performed by: STUDENT IN AN ORGANIZED HEALTH CARE EDUCATION/TRAINING PROGRAM

## 2024-08-12 PROCEDURE — 25000003 PHARM REV CODE 250: Performed by: STUDENT IN AN ORGANIZED HEALTH CARE EDUCATION/TRAINING PROGRAM

## 2024-08-12 PROCEDURE — 83880 ASSAY OF NATRIURETIC PEPTIDE: CPT | Performed by: STUDENT IN AN ORGANIZED HEALTH CARE EDUCATION/TRAINING PROGRAM

## 2024-08-12 PROCEDURE — 99285 EMERGENCY DEPT VISIT HI MDM: CPT | Mod: 25

## 2024-08-12 PROCEDURE — 93010 ELECTROCARDIOGRAM REPORT: CPT | Mod: ,,, | Performed by: INTERNAL MEDICINE

## 2024-08-12 PROCEDURE — 80053 COMPREHEN METABOLIC PANEL: CPT | Performed by: STUDENT IN AN ORGANIZED HEALTH CARE EDUCATION/TRAINING PROGRAM

## 2024-08-12 PROCEDURE — 93005 ELECTROCARDIOGRAM TRACING: CPT

## 2024-08-12 RX ORDER — ASPIRIN 325 MG
325 TABLET ORAL
Status: COMPLETED | OUTPATIENT
Start: 2024-08-12 | End: 2024-08-12

## 2024-08-12 RX ADMIN — ASPIRIN 325 MG ORAL TABLET 325 MG: 325 PILL ORAL at 02:08

## 2024-08-12 NOTE — ED TRIAGE NOTES
Chest Pain (Pt presents to ER with complaints of sharp epigastric pain that started today while at PT. Pt states he started feeling chest discomfort 0130 this am. Pt reports his left arm feels like it is tingling and he is a little light headed as well as a little SOB. Pt states he had a heart ablation like 2 years ago. Pt rates pain 10/10. Pt denies taking any pain meds. )

## 2024-08-12 NOTE — DISCHARGE INSTRUCTIONS
Thank you for coming to our Emergency Department today. It is important to remember that some problems or medical conditions are difficult to diagnose and may not be found during your Emergency Department visit.     Be sure to follow up with your primary care doctor and review all labs/imaging/tests that were performed during your ER visit with them. Some labs/tests may be outside of the normal range and require non-emergent follow-up and further investigation to help diagnose/exclude/prevent complications or other potentially serious medical conditions that were not addressed during your ER visit.    If you do not have a primary care doctor, you may contact the one listed on your discharge paperwork or you may also call the [facility] Appointment Desk at [phone] to schedule an appointment and establish care with one. It is important to your health that you have a primary care doctor.    Please take all medications as directed. All medications may potentially have side-effects and it is impossible to predict which medications may give you side-effects or what side-effects (if any) they will give you.. If you feel that you are having a negative effect or side-effect of any medication you should immediately stop taking them and seek medical attention. If you feel that you are having a life-threatening reaction call 911.    Return to the ER with any questions/concerns, new/concerning symptoms, worsening or failure to improve.     Do not drive, swim, climb to height, take a bath, operate heavy machinery, drink alcohol or take potentially sedating medications, sign any legal documents or make any important decisions for 24 hours if you have received any pain medications, sedatives or mood altering drugs during your ER visit or within 24 hours of taking them if they have been prescribed to you.     You can find additional resources for Dentists, hearing aids, durable medical equipment, low cost pharmacies and  other resources at https://[facility].org    BELOW THIS LINE ONLY APPLIES IF YOU HAVE A COVID TEST PENDING OR IF YOU HAVE BEEN DIAGNOSED WITH COVID:  Please access [facility] to review the results of your test. Until the results of your COVID test return, you should isolate yourself so as not to potentially spread illness to others.   If your COVID test returns positive, you should isolate yourself so as not to spread illness to others. After five full days, if you are feeling better and you have not had fever for 24 hours, you can return to your typical daily activities, but you must wear a mask around others for an additional 5 days.   If your COVID test returns negative and you are either unvaccinated or more than six months out from your two-dose vaccine and are not yet boosted, you should still quarantine for 5 full days followed by strict mask use for an additional 5 full days.   If your COVID test returns negative and you have received your 2-dose initial vaccine as well as a booster, you should continue strict mask use for 10 full days after the exposure.  For all those exposed, best practice includes a test at day 5 after the exposure. This can be a home test or a test through one of the many testing centers throughout our community.   Masking is always advised to limit the spread of COVID. Cdc.gov is an excellent site to obtain the latest up to date recommendations regarding COVID and COVID testing.     CDC Testing and Quarantine Guidelines for patients with exposure to a known-positive COVID-19 person:  A close exposure is defined as anyone who has had an exposure (masked or unmasked) to a known COVID -19 positive person within 6 feet of someone for a cumulative total of 15 minutes or more over a 24-hour period.   Vaccinated and/or if you recently had a positive covid test within 90 days do NOT need to quarantine after contact with someone who had COVID-19 unless you develop symptoms.   Fully vaccinated  people who have not had a positive test within 90 days, should get tested 3-5 days after their exposure, even if they don't have symptoms and wear a mask indoors in public for 14 days following exposure or until their test result is negative.      Unvaccinated and/or NOT had a positive test within 90 days and meet close exposure  You are required by CDC guidelines to quarantine for at least 5 days from time of exposure followed by 5 days of strict masking. It is recommended, but not required to test after 5 days, unless you develop symptoms, in which case you should test at that time.  If you get tested after 5 days and your test is positive, your 5 day period of isolation starts the day of the positive test.    If your exposure does not meet the above definition, you can return to your normal daily activities to include social distancing, wearing a mask and frequent handwashing.      Here is a link to guidance from the CDC:  https://www.cdc.gov/media/releases/2021/s1227-isolation-quarantine-guidance.html      [place] Dept Of Health Testing Sites:  https://ldh.la.gov/page/3934      [facility] website with testing locations and guidance:  https://www.[email or web address].[email or web address]
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
## 2024-08-16 ENCOUNTER — HOSPITAL ENCOUNTER (EMERGENCY)
Facility: HOSPITAL | Age: 39
Discharge: HOME | End: 2024-08-17
Attending: EMERGENCY MEDICINE
Payer: MEDICAID

## 2024-08-16 VITALS
WEIGHT: 214 LBS | SYSTOLIC BLOOD PRESSURE: 146 MMHG | DIASTOLIC BLOOD PRESSURE: 91 MMHG | RESPIRATION RATE: 18 BRPM | BODY MASS INDEX: 31.7 KG/M2 | TEMPERATURE: 98 F | OXYGEN SATURATION: 98 % | HEIGHT: 69 IN | HEART RATE: 69 BPM

## 2024-08-16 DIAGNOSIS — R07.9 CHEST PAIN: ICD-10-CM

## 2024-08-16 LAB
ALBUMIN SERPL-MCNC: 3.9 G/DL (ref 3.3–5.5)
ALP SERPL-CCNC: 57 U/L (ref 42–141)
BILIRUB SERPL-MCNC: 0.4 MG/DL (ref 0.2–1.6)
BUN SERPL-MCNC: 7 MG/DL (ref 7–22)
CALCIUM SERPL-MCNC: 10.1 MG/DL (ref 8–10.3)
CHLORIDE SERPL-SCNC: 104 MMOL/L (ref 98–108)
CREAT SERPL-MCNC: 1 MG/DL (ref 0.6–1.2)
GLUCOSE SERPL-MCNC: 95 MG/DL (ref 73–118)
HCT, POC: NORMAL
HGB, POC: NORMAL (ref 14–18)
MCH, POC: NORMAL
MCHC, POC: NORMAL
MCV, POC: NORMAL
MPV, POC: NORMAL
POC ALT (SGPT): 25 U/L (ref 10–47)
POC AST (SGOT): 27 U/L (ref 11–38)
POC CARDIAC TROPONIN I: 0 NG/ML (ref 0–0.08)
POC PLATELET COUNT: NORMAL
POC TCO2: 29 MMOL/L (ref 18–33)
POTASSIUM BLD-SCNC: 3.6 MMOL/L (ref 3.6–5.1)
PROTEIN, POC: 7.4 G/DL (ref 6.4–8.1)
RBC, POC: NORMAL
RDW, POC: NORMAL
SAMPLE: NORMAL
SODIUM BLD-SCNC: 139 MMOL/L (ref 128–145)
WBC, POC: NORMAL

## 2024-08-16 PROCEDURE — 25000003 PHARM REV CODE 250: Mod: ER | Performed by: EMERGENCY MEDICINE

## 2024-08-16 PROCEDURE — 99285 EMERGENCY DEPT VISIT HI MDM: CPT | Mod: 25,ER

## 2024-08-16 RX ORDER — ACETAMINOPHEN 325 MG/1
650 TABLET ORAL
Status: COMPLETED | OUTPATIENT
Start: 2024-08-16 | End: 2024-08-16

## 2024-08-16 RX ADMIN — ACETAMINOPHEN 650 MG: 325 TABLET ORAL at 10:08
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
## 2024-08-17 NOTE — DISCHARGE INSTRUCTIONS
Schedule follow up appointment with cardiology for re-evaluation within the next 1-2 weeks.    Seek medical care if symptoms worsen or any concerns

## 2024-08-29 ENCOUNTER — HOSPITAL ENCOUNTER (EMERGENCY)
Facility: HOSPITAL | Age: 39
Discharge: HOME | End: 2024-08-29
Attending: EMERGENCY MEDICINE
Payer: MEDICAID

## 2024-08-29 VITALS
RESPIRATION RATE: 20 BRPM | HEIGHT: 69 IN | HEART RATE: 80 BPM | SYSTOLIC BLOOD PRESSURE: 135 MMHG | BODY MASS INDEX: 31.7 KG/M2 | OXYGEN SATURATION: 99 % | WEIGHT: 214 LBS | DIASTOLIC BLOOD PRESSURE: 90 MMHG | TEMPERATURE: 98 F

## 2024-08-29 DIAGNOSIS — G44.329 CHRONIC POST-TRAUMATIC HEADACHE, NOT INTRACTABLE: Primary | ICD-10-CM

## 2024-08-29 PROCEDURE — 25000003 PHARM REV CODE 250: Mod: ER | Performed by: EMERGENCY MEDICINE

## 2024-08-29 PROCEDURE — 99284 EMERGENCY DEPT VISIT MOD MDM: CPT | Mod: 25,ER

## 2024-08-29 PROCEDURE — 63600175 PHARM REV CODE 636 W HCPCS: Mod: ER | Performed by: EMERGENCY MEDICINE

## 2024-08-29 PROCEDURE — 96374 THER/PROPH/DIAG INJ IV PUSH: CPT | Mod: ER

## 2024-08-29 RX ORDER — BUTALBITAL, ACETAMINOPHEN AND CAFFEINE 50; 325; 40 MG/1; MG/1; MG/1
1 TABLET ORAL EVERY 4 HOURS PRN
Qty: 25 TABLET | Refills: 0 | Status: SHIPPED | OUTPATIENT
Start: 2024-08-29 | End: 2024-09-28

## 2024-08-29 RX ORDER — KETOROLAC TROMETHAMINE 30 MG/ML
15 INJECTION, SOLUTION INTRAMUSCULAR; INTRAVENOUS
Status: COMPLETED | OUTPATIENT
Start: 2024-08-29 | End: 2024-08-29

## 2024-08-29 RX ORDER — BUTALBITAL, ACETAMINOPHEN AND CAFFEINE 50; 325; 40 MG/1; MG/1; MG/1
1 TABLET ORAL
Status: COMPLETED | OUTPATIENT
Start: 2024-08-29 | End: 2024-08-29

## 2024-08-29 RX ADMIN — KETOROLAC TROMETHAMINE 15 MG: 30 INJECTION, SOLUTION INTRAMUSCULAR at 09:08

## 2024-08-29 RX ADMIN — SODIUM CHLORIDE 1000 ML: 9 INJECTION, SOLUTION INTRAVENOUS at 09:08

## 2024-08-29 RX ADMIN — BUTALBITAL, ACETAMINOPHEN, AND CAFFEINE 1 TABLET: 325; 50; 40 TABLET ORAL at 08:08
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
## 2024-11-07 ENCOUNTER — HOSPITAL ENCOUNTER (EMERGENCY)
Facility: HOSPITAL | Age: 39
Discharge: HOME | End: 2024-11-07
Attending: EMERGENCY MEDICINE
Payer: MEDICAID

## 2024-11-07 VITALS
HEIGHT: 69 IN | TEMPERATURE: 99 F | HEART RATE: 74 BPM | BODY MASS INDEX: 32.73 KG/M2 | DIASTOLIC BLOOD PRESSURE: 61 MMHG | OXYGEN SATURATION: 99 % | SYSTOLIC BLOOD PRESSURE: 117 MMHG | RESPIRATION RATE: 15 BRPM | WEIGHT: 221 LBS

## 2024-11-07 DIAGNOSIS — R35.0 FREQUENT URINATION: ICD-10-CM

## 2024-11-07 DIAGNOSIS — R07.9 CHEST PAIN: Primary | ICD-10-CM

## 2024-11-07 LAB
ALBUMIN SERPL-MCNC: 4.2 G/DL (ref 3.3–5.5)
ALP SERPL-CCNC: 71 U/L (ref 42–141)
BILIRUB SERPL-MCNC: 0.6 MG/DL (ref 0.2–1.6)
BILIRUBIN, POC UA: NEGATIVE
BLOOD, POC UA: ABNORMAL
BUN SERPL-MCNC: 16 MG/DL (ref 7–22)
CALCIUM SERPL-MCNC: 9.8 MG/DL (ref 8–10.3)
CHLORIDE SERPL-SCNC: 109 MMOL/L (ref 98–108)
CLARITY, UA: CLEAR
COLOR, UA: YELLOW
CREAT SERPL-MCNC: 1.1 MG/DL (ref 0.6–1.2)
GLUCOSE SERPL-MCNC: 104 MG/DL (ref 73–118)
GLUCOSE, POC UA: NEGATIVE
HCT, POC: NORMAL
HGB, POC: NORMAL (ref 14–18)
KETONES, POC UA: NEGATIVE
LEUKOCYTE EST, POC UA: NEGATIVE
MCH, POC: NORMAL
MCHC, POC: NORMAL
MCV, POC: NORMAL
MPV, POC: NORMAL
NITRITE, POC UA: NEGATIVE
PH UR STRIP: 5.5 [PH] (ref 5–8)
POC ALT (SGPT): 36 U/L (ref 10–47)
POC AST (SGOT): 35 U/L (ref 11–38)
POC B-TYPE NATRIURETIC PEPTIDE: 7.2 PG/ML (ref 0–100)
POC CARDIAC TROPONIN I: 0 NG/ML (ref 0–0.08)
POC D-DI: 438 NG/ML (ref 0–450)
POC PLATELET COUNT: NORMAL
POC TCO2: 27 MMOL/L (ref 18–33)
POCT GLUCOSE: 115 MG/DL (ref 70–110)
POTASSIUM BLD-SCNC: 4.3 MMOL/L (ref 3.6–5.1)
PROTEIN, POC UA: NEGATIVE
PROTEIN, POC: 8.1 G/DL (ref 6.4–8.1)
RBC, POC: NORMAL
RDW, POC: NORMAL
SAMPLE: NORMAL
SODIUM BLD-SCNC: 143 MMOL/L (ref 128–145)
SPECIFIC GRAVITY, POC UA: >=1.03 (ref 1–1.03)
UROBILINOGEN, POC UA: 0.2 E.U./DL
WBC, POC: NORMAL

## 2024-11-07 PROCEDURE — 93005 ELECTROCARDIOGRAM TRACING: CPT | Mod: ER

## 2024-11-07 PROCEDURE — 99285 EMERGENCY DEPT VISIT HI MDM: CPT | Mod: 25,ER

## 2024-11-07 PROCEDURE — 51798 US URINE CAPACITY MEASURE: CPT | Mod: ER

## 2024-11-07 PROCEDURE — 93010 ELECTROCARDIOGRAM REPORT: CPT | Mod: ,,, | Performed by: INTERNAL MEDICINE

## 2024-11-07 PROCEDURE — 82962 GLUCOSE BLOOD TEST: CPT | Mod: ER

## 2024-11-07 PROCEDURE — 25000003 PHARM REV CODE 250: Mod: ER | Performed by: EMERGENCY MEDICINE

## 2024-11-07 RX ORDER — METHOCARBAMOL 750 MG/1
1500 TABLET, FILM COATED ORAL EVERY 6 HOURS
Qty: 24 TABLET | Refills: 0 | Status: SHIPPED | OUTPATIENT
Start: 2024-11-07 | End: 2024-11-10

## 2024-11-07 RX ORDER — NAPROXEN SODIUM 220 MG/1
81 TABLET, FILM COATED ORAL DAILY
COMMUNITY
Start: 2024-11-07 | End: 2025-11-07

## 2024-11-07 RX ORDER — NAPROXEN 500 MG/1
500 TABLET ORAL 2 TIMES DAILY WITH MEALS
Qty: 20 TABLET | Refills: 0 | Status: SHIPPED | OUTPATIENT
Start: 2024-11-07 | End: 2024-11-17

## 2024-11-07 RX ORDER — METHOCARBAMOL 750 MG/1
1500 TABLET, FILM COATED ORAL
Status: COMPLETED | OUTPATIENT
Start: 2024-11-07 | End: 2024-11-07

## 2024-11-07 RX ORDER — ASPIRIN 325 MG
325 TABLET ORAL
Status: COMPLETED | OUTPATIENT
Start: 2024-11-07 | End: 2024-11-07

## 2024-11-07 RX ADMIN — METHOCARBAMOL TABLETS 1500 MG: 750 TABLET, COATED ORAL at 09:11

## 2024-11-07 RX ADMIN — ASPIRIN 325 MG ORAL TABLET 325 MG: 325 PILL ORAL at 09:11
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
## 2024-11-27 ENCOUNTER — HOSPITAL ENCOUNTER (EMERGENCY)
Facility: HOSPITAL | Age: 39
Discharge: HOME | End: 2024-11-27
Attending: STUDENT IN AN ORGANIZED HEALTH CARE EDUCATION/TRAINING PROGRAM
Payer: MEDICAID

## 2024-11-27 VITALS
OXYGEN SATURATION: 97 % | TEMPERATURE: 98 F | DIASTOLIC BLOOD PRESSURE: 81 MMHG | HEIGHT: 69 IN | BODY MASS INDEX: 32.73 KG/M2 | HEART RATE: 89 BPM | RESPIRATION RATE: 20 BRPM | WEIGHT: 221 LBS | SYSTOLIC BLOOD PRESSURE: 130 MMHG

## 2024-11-27 DIAGNOSIS — M77.8 LEFT HAND TENDONITIS: ICD-10-CM

## 2024-11-27 DIAGNOSIS — S69.92XA HAND INJURY, LEFT, INITIAL ENCOUNTER: Primary | ICD-10-CM

## 2024-11-27 DIAGNOSIS — M79.642 LEFT HAND PAIN: ICD-10-CM

## 2024-11-27 PROCEDURE — 99281 EMR DPT VST MAYX REQ PHY/QHP: CPT

## 2024-11-28 NOTE — PLAN OF CARE
JERAD faxed an Ambulatory Referral/Consult to Orthopedics @[facility]. Fax#[phone].  DEL Crabtree, MSW-LMSW  Medical Social Worker/  DAVID Potter

## 2024-11-28 NOTE — DISCHARGE INSTRUCTIONS
It was a pleasure taking care of you today!     Home Care:   - Tylenol 1000mg every 8 hours and Ibuprofen 400mg every 4-6 hours as needed for pain/fever/body aches  - Rest your hand. May use ice packs in sets of 15 minutes    Follow-Up Plan:  - Follow up with Orthopedic Surgery at [facility]. They should contact you to schedule an appointment or you may call the number below.   - Follow-up with primary care doctor within 3 - 5 days to discuss your recent ER visit and any additional concerns that you may have.  - Additional testing and/or evaluation as directed by your primary doctor    Return to the Emergency Department for symptoms including but not limited to: persistence or worsening of symptoms, shortness of breath or chest pain, inability to drink without vomiting, passing out/fainting/ loss of consciousness, or if you have other concerns.

## 2024-11-28 NOTE — ED TRIAGE NOTES
Cristo Ruff, a 39 y.o. male presents to the ED w/ complaint of left hand injury. Patient stated he was doing yard work and felt something tear. Patient denies C/P,SOB and N/V/D.    Triage note:  Chief Complaint   Patient presents with    Hand Injury     Left hand pain after injury at work two days ago.      Review of patient's allergies indicates:  No Known Allergies  Past Medical History:   Diagnosis Date    A-fib     Gastric ulcer due to Helicobacter pylori     Gastric ulcer, acute     GERD (gastroesophageal reflux disease)     Hyperlipemia     Hypertension     Paroxysmal atrial fibrillation     Sleep apnea, unspecified
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
## 2024-12-08 ENCOUNTER — HOSPITAL ENCOUNTER (EMERGENCY)
Facility: HOSPITAL | Age: 39
Discharge: HOME | End: 2024-12-09
Attending: STUDENT IN AN ORGANIZED HEALTH CARE EDUCATION/TRAINING PROGRAM
Payer: MEDICAID

## 2024-12-08 DIAGNOSIS — R07.9 CHEST PAIN: Primary | ICD-10-CM

## 2024-12-08 PROBLEM — S06.6XAA SUBARACHNOID HEMORRHAGE FOLLOWING INJURY: Status: ACTIVE | Noted: 2024-07-16

## 2024-12-08 PROBLEM — L84 CORNS AND CALLOSITY: Status: ACTIVE | Noted: 2024-07-28

## 2024-12-08 PROBLEM — G44.009 CLUSTER HEADACHE: Status: ACTIVE | Noted: 2021-04-16

## 2024-12-08 PROBLEM — F41.1 GENERALIZED ANXIETY DISORDER WITH PANIC ATTACKS: Status: ACTIVE | Noted: 2024-08-12

## 2024-12-08 PROBLEM — E78.00 PURE HYPERCHOLESTEROLEMIA: Status: ACTIVE | Noted: 2024-07-28

## 2024-12-08 PROBLEM — K21.9 GASTROESOPHAGEAL REFLUX DISEASE: Status: ACTIVE | Noted: 2024-07-28

## 2024-12-08 PROBLEM — I30.9 ACUTE PERICARDITIS: Status: ACTIVE | Noted: 2024-07-28

## 2024-12-08 PROBLEM — F43.9 TRAUMA AND STRESSOR-RELATED DISORDER: Status: ACTIVE | Noted: 2024-08-12

## 2024-12-08 PROBLEM — U07.1 PNEUMONIA DUE TO COVID-19 VIRUS: Status: ACTIVE | Noted: 2021-06-04

## 2024-12-08 PROBLEM — A60.00 GENITAL HERPES SIMPLEX: Status: ACTIVE | Noted: 2024-07-28

## 2024-12-08 PROBLEM — J30.9 ALLERGIC RHINITIS: Status: ACTIVE | Noted: 2024-07-28

## 2024-12-08 PROBLEM — J12.82 PNEUMONIA DUE TO COVID-19 VIRUS: Status: ACTIVE | Noted: 2021-06-04

## 2024-12-08 PROBLEM — E78.5 HYPERLIPIDEMIA: Status: ACTIVE | Noted: 2024-07-28

## 2024-12-08 PROBLEM — I48.0 PAROXYSMAL ATRIAL FIBRILLATION: Status: ACTIVE | Noted: 2023-01-31

## 2024-12-08 PROBLEM — F41.0 GENERALIZED ANXIETY DISORDER WITH PANIC ATTACKS: Status: ACTIVE | Noted: 2024-08-12

## 2024-12-08 PROBLEM — F32.1 CURRENT MODERATE EPISODE OF MAJOR DEPRESSIVE DISORDER WITHOUT PRIOR EPISODE: Status: ACTIVE | Noted: 2024-08-12

## 2024-12-08 PROBLEM — Z20.2 STD EXPOSURE: Status: ACTIVE | Noted: 2024-07-28

## 2024-12-08 LAB
ALBUMIN SERPL BCP-MCNC: 3.7 G/DL (ref 3.5–5.2)
ALP SERPL-CCNC: 71 U/L (ref 40–150)
ALT SERPL W/O P-5'-P-CCNC: 21 U/L (ref 10–44)
ANION GAP SERPL CALC-SCNC: 8 MMOL/L (ref 8–16)
AST SERPL-CCNC: 18 U/L (ref 10–40)
BASOPHILS # BLD AUTO: 0.06 K/UL (ref 0–0.2)
BASOPHILS NFR BLD: 0.6 % (ref 0–1.9)
BILIRUB SERPL-MCNC: 0.1 MG/DL (ref 0.1–1)
BNP SERPL-MCNC: <10 PG/ML (ref 0–99)
BUN SERPL-MCNC: 13 MG/DL (ref 6–20)
CALCIUM SERPL-MCNC: 9.2 MG/DL (ref 8.7–10.5)
CHLORIDE SERPL-SCNC: 106 MMOL/L (ref 95–110)
CO2 SERPL-SCNC: 24 MMOL/L (ref 23–29)
CREAT SERPL-MCNC: 1 MG/DL (ref 0.5–1.4)
D DIMER PPP IA.FEU-MCNC: 0.46 MG/L FEU
DIFFERENTIAL METHOD BLD: ABNORMAL
EOSINOPHIL # BLD AUTO: 0.4 K/UL (ref 0–0.5)
EOSINOPHIL NFR BLD: 3.9 % (ref 0–8)
ERYTHROCYTE [DISTWIDTH] IN BLOOD BY AUTOMATED COUNT: 16 % (ref 11.5–14.5)
EST. GFR  (NO RACE VARIABLE): >60 ML/MIN/1.73 M^2
GLUCOSE SERPL-MCNC: 110 MG/DL (ref 70–110)
HCT VFR BLD AUTO: 40.3 % (ref 40–54)
HCV AB SERPL QL IA: NORMAL
HGB BLD-MCNC: 12.6 G/DL (ref 14–18)
HIV 1+2 AB+HIV1 P24 AG SERPL QL IA: NORMAL
IMM GRANULOCYTES # BLD AUTO: 0.04 K/UL (ref 0–0.04)
IMM GRANULOCYTES NFR BLD AUTO: 0.4 % (ref 0–0.5)
INFLUENZA A, MOLECULAR: NEGATIVE
INFLUENZA B, MOLECULAR: NEGATIVE
LYMPHOCYTES # BLD AUTO: 3.4 K/UL (ref 1–4.8)
LYMPHOCYTES NFR BLD: 31.6 % (ref 18–48)
MCH RBC QN AUTO: 25.8 PG (ref 27–31)
MCHC RBC AUTO-ENTMCNC: 31.3 G/DL (ref 32–36)
MCV RBC AUTO: 82 FL (ref 82–98)
MONOCYTES # BLD AUTO: 1 K/UL (ref 0.3–1)
MONOCYTES NFR BLD: 9.7 % (ref 4–15)
NEUTROPHILS # BLD AUTO: 5.8 K/UL (ref 1.8–7.7)
NEUTROPHILS NFR BLD: 53.8 % (ref 38–73)
NRBC BLD-RTO: 0 /100 WBC
PLATELET # BLD AUTO: 205 K/UL (ref 150–450)
PLATELET BLD QL SMEAR: ABNORMAL
PMV BLD AUTO: 12.7 FL (ref 9.2–12.9)
POTASSIUM SERPL-SCNC: 3.9 MMOL/L (ref 3.5–5.1)
PROT SERPL-MCNC: 7.5 G/DL (ref 6–8.4)
RBC # BLD AUTO: 4.89 M/UL (ref 4.6–6.2)
SARS-COV-2 RDRP RESP QL NAA+PROBE: NEGATIVE
SODIUM SERPL-SCNC: 138 MMOL/L (ref 136–145)
SPECIMEN SOURCE: NORMAL
TROPONIN I SERPL DL<=0.01 NG/ML-MCNC: <3 NG/L (ref 0–35)
TROPONIN I SERPL DL<=0.01 NG/ML-MCNC: <3 NG/L (ref 0–35)
WBC # BLD AUTO: 10.72 K/UL (ref 3.9–12.7)

## 2024-12-08 PROCEDURE — 84484 ASSAY OF TROPONIN QUANT: CPT | Performed by: PHYSICIAN ASSISTANT

## 2024-12-08 PROCEDURE — 86803 HEPATITIS C AB TEST: CPT | Performed by: PHYSICIAN ASSISTANT

## 2024-12-08 PROCEDURE — 84484 ASSAY OF TROPONIN QUANT: CPT | Mod: 91 | Performed by: PHYSICIAN ASSISTANT

## 2024-12-08 PROCEDURE — 83880 ASSAY OF NATRIURETIC PEPTIDE: CPT | Performed by: PHYSICIAN ASSISTANT

## 2024-12-08 PROCEDURE — 85025 COMPLETE CBC W/AUTO DIFF WBC: CPT | Performed by: PHYSICIAN ASSISTANT

## 2024-12-08 PROCEDURE — 80053 COMPREHEN METABOLIC PANEL: CPT | Performed by: PHYSICIAN ASSISTANT

## 2024-12-08 PROCEDURE — 87502 INFLUENZA DNA AMP PROBE: CPT | Performed by: PHYSICIAN ASSISTANT

## 2024-12-08 PROCEDURE — 63600175 PHARM REV CODE 636 W HCPCS: Performed by: PHYSICIAN ASSISTANT

## 2024-12-08 PROCEDURE — 93005 ELECTROCARDIOGRAM TRACING: CPT | Mod: ER

## 2024-12-08 PROCEDURE — 93010 ELECTROCARDIOGRAM REPORT: CPT | Mod: ,,, | Performed by: INTERNAL MEDICINE

## 2024-12-08 PROCEDURE — 87389 HIV-1 AG W/HIV-1&-2 AB AG IA: CPT | Performed by: PHYSICIAN ASSISTANT

## 2024-12-08 PROCEDURE — 99285 EMERGENCY DEPT VISIT HI MDM: CPT | Mod: 25

## 2024-12-08 PROCEDURE — 85379 FIBRIN DEGRADATION QUANT: CPT | Performed by: PHYSICIAN ASSISTANT

## 2024-12-08 PROCEDURE — 87635 SARS-COV-2 COVID-19 AMP PRB: CPT | Performed by: PHYSICIAN ASSISTANT

## 2024-12-08 PROCEDURE — 96374 THER/PROPH/DIAG INJ IV PUSH: CPT

## 2024-12-08 RX ORDER — KETOROLAC TROMETHAMINE 30 MG/ML
10 INJECTION, SOLUTION INTRAMUSCULAR; INTRAVENOUS
Status: COMPLETED | OUTPATIENT
Start: 2024-12-08 | End: 2024-12-08

## 2024-12-08 RX ORDER — KETOROLAC TROMETHAMINE 10 MG/1
10 TABLET, FILM COATED ORAL EVERY 6 HOURS PRN
Qty: 20 TABLET | Refills: 0 | Status: SHIPPED | OUTPATIENT
Start: 2024-12-08 | End: 2024-12-13

## 2024-12-08 RX ADMIN — KETOROLAC TROMETHAMINE 10 MG: 30 INJECTION, SOLUTION INTRAMUSCULAR; INTRAVENOUS at 09:12

## 2024-12-09 VITALS
RESPIRATION RATE: 16 BRPM | OXYGEN SATURATION: 98 % | TEMPERATURE: 99 F | WEIGHT: 220 LBS | HEIGHT: 69 IN | BODY MASS INDEX: 32.58 KG/M2 | HEART RATE: 68 BPM | SYSTOLIC BLOOD PRESSURE: 118 MMHG | DIASTOLIC BLOOD PRESSURE: 74 MMHG

## 2024-12-09 LAB
OHS QRS DURATION: 90 MS
OHS QTC CALCULATION: 399 MS

## 2024-12-09 NOTE — ED TRIAGE NOTES
Cristo Ruff, a 39 y.o. male presents to the ED w/ complaint of right sided chest pain x1 hour. Pt reports pain is 8/10 and sharp. Pain gets worse with deep breath. +cough since yesterday. Pt reports has been over year since taking prescribed blood thinners. Denies N/V/D, fever, SOB.     Triage note:  Chief Complaint   Patient presents with    Chest Pain     Pt reports R sided pain when taking a deep breath x1 hour. Pt states he was laying in bed when pain started. Denies any recent injury.      Review of patient's allergies indicates:  No Known Allergies  Past Medical History:   Diagnosis Date    A-fib     Gastric ulcer due to Helicobacter pylori     Gastric ulcer, acute     GERD (gastroesophageal reflux disease)     Hyperlipemia     Hypertension     Paroxysmal atrial fibrillation     Sleep apnea, unspecified

## 2024-12-09 NOTE — DISCHARGE INSTRUCTIONS
Diagnosis: Chest pain    I am not sure what is causing your chest pain but I think it might be due to inflammation of your chest wall.  I am prescribing an anti-inflammatory pain medicine you can take as needed for pain.  Your lab tests, EKG and chest x-ray did not show any concerning findings.    Please schedule an appointment with your primary care doctor for follow-up. If you start to have any new or worsening symptoms, please come back to the emergency department.

## 2024-12-09 NOTE — ED PROVIDER NOTES
Encounter Date: 12/8/2024       History     Chief Complaint   Patient presents with    Chest Pain     Pt reports R sided pain when taking a deep breath x1 hour. Pt states he was laying in bed when pain started. Denies any recent injury.      39-year-old male with history of hypertension, hyperlipidemia, atrial fibrillation status post ablation (not on anticoagulation) who presents to the emergency department with chief complaint of right lower chest pain that started about two hours ago.  Pain is sharp and pleuritic in nature.  Denies ghazal short of breath, but feels scared to inhale due to the pain.  No recent travel or surgery.  No leg swelling.  No hormone use.  No abdominal pain, nausea, vomiting, diarrhea, fever.  No coughing.  Denies worsening or alleviating factors.      Review of patient's allergies indicates:  No Known Allergies  Past Medical History:   Diagnosis Date    A-fib     Gastric ulcer due to Helicobacter pylori     Gastric ulcer, acute     GERD (gastroesophageal reflux disease)     Hyperlipemia     Hypertension     Paroxysmal atrial fibrillation     Sleep apnea, unspecified      Past Surgical History:   Procedure Laterality Date    HERNIA REPAIR       Family History   Problem Relation Name Age of Onset    Hypertension Mother      Diabetes Mellitus Mother      Kidney failure Mother      Hypertension Father      Diabetes Mellitus Father      Hypertension Sister       Social History     Tobacco Use    Smoking status: Former    Smokeless tobacco: Never   Substance Use Topics    Alcohol use: Yes     Comment: occasionally    Drug use: Not Currently     Types: Marijuana     Comment: Daily use     Review of Systems   Cardiovascular:  Positive for chest pain.       Physical Exam     Initial Vitals [12/08/24 1949]   BP Pulse Resp Temp SpO2   (!) 145/94 82 18 98.3 °F (36.8 °C) 97 %      MAP       --         Physical Exam    Vitals reviewed.  Constitutional: He appears well-developed and well-nourished. He is  not diaphoretic. No distress.   HENT:   Head: Normocephalic and atraumatic. Mouth/Throat: Oropharynx is clear and moist.   Eyes: EOM are normal. Pupils are equal, round, and reactive to light.   Neck: Neck supple.   Normal range of motion.  Cardiovascular:  Normal rate, regular rhythm, normal heart sounds and intact distal pulses.     Exam reveals no gallop and no friction rub.       No murmur heard.  Pulmonary/Chest: Breath sounds normal. He has no wheezes. He has no rhonchi. He has no rales.   Abdominal: Abdomen is soft. Bowel sounds are normal. There is no abdominal tenderness. There is no rebound and no guarding.   Musculoskeletal:         General: Normal range of motion.      Cervical back: Normal range of motion and neck supple.     Neurological: He is alert and oriented to person, place, and time. He has normal strength. GCS score is 15. GCS eye subscore is 4. GCS verbal subscore is 5. GCS motor subscore is 6.   Skin: Skin is warm and dry. Capillary refill takes less than 2 seconds.   Psychiatric: He has a normal mood and affect. His behavior is normal. Judgment and thought content normal.         ED Course   Procedures  Labs Reviewed   HEPATITIS C ANTIBODY   HIV 1 / 2 ANTIBODY   CBC W/ AUTO DIFFERENTIAL   COMPREHENSIVE METABOLIC PANEL   TROPONIN I HIGH SENSITIVITY   B-TYPE NATRIURETIC PEPTIDE   D DIMER, QUANTITATIVE     EKG Readings: (Independently Interpreted)   Initial Reading: No STEMI. Rhythm: Normal Sinus Rhythm. Heart Rate: 80.       Imaging Results    None          Medications   ketorolac injection 9.999 mg (has no administration in time range)     Medical Decision Making  Emergent evaluation of a 39 y.o. male presenting to the emergency department complaining of sharp, pleuritic right lower chest pain that started prior to arrival. Patient is afebrile, hemodynamically stable, and non toxic appearing.   Will order labs, imaging, analgesia.     Differential diagnosis includes but isn't limited to PE,  ACS, pneumothorax, biliary colic.    COVID and influenza negative.  No severe metabolic or hematologic derangements.  Troponin negative.  BNP negative.  D-dimer within normal limits.  Chest x-ray and repeat troponin pending.  Due to shift change, will sign out to oncoming ED provider who will continue to monitor until final disposition is reached.    Amount and/or Complexity of Data Reviewed  Labs: ordered.  Radiology: ordered.    Risk  Prescription drug management.                                      Clinical Impression:  Final diagnoses:  [R07.9] Chest pain                 Hoda Rene PA-C  12/09/24 7130

## 2024-12-09 NOTE — PROVIDER PROGRESS NOTES - EMERGENCY DEPT.
Encounter Date: 12/8/2024    ED Physician Progress Notes          ED Physician Hand-off Note:    ED Course: I assumed care of patient from off-going ED physician team. Briefly, Patient is a 39-year-old male presenting for pleuritic chest pain.    At the time of signout plan was pending chest x-ray, 2nd troponin.    Medications given in the ED:    Medications   ketorolac injection 9.999 mg (9.999 mg Intravenous Given 12/8/24 2125)     Patient reports relief with Toradol.  Workup is reassuring with negative troponin x2.  D-dimer negative.  Will discharge with NSAIDs, instructions to follow up with PCP and return to the ED for worsening symptoms. Stressed the importance of follow-up, strict ED return precautions given.  Patient voiced understanding and is comfortable with discharge.

## 2025-04-29 ENCOUNTER — HOSPITAL ENCOUNTER (EMERGENCY)
Facility: HOSPITAL | Age: 40
Discharge: HOME | End: 2025-04-29
Attending: EMERGENCY MEDICINE
Payer: MEDICAID

## 2025-04-29 VITALS
OXYGEN SATURATION: 99 % | HEART RATE: 66 BPM | TEMPERATURE: 98 F | HEIGHT: 71 IN | BODY MASS INDEX: 31.5 KG/M2 | WEIGHT: 225 LBS | RESPIRATION RATE: 18 BRPM | SYSTOLIC BLOOD PRESSURE: 119 MMHG | DIASTOLIC BLOOD PRESSURE: 66 MMHG

## 2025-04-29 DIAGNOSIS — G43.809 OTHER MIGRAINE WITHOUT STATUS MIGRAINOSUS, NOT INTRACTABLE: Primary | ICD-10-CM

## 2025-04-29 PROCEDURE — 96374 THER/PROPH/DIAG INJ IV PUSH: CPT | Mod: ER

## 2025-04-29 PROCEDURE — 96361 HYDRATE IV INFUSION ADD-ON: CPT | Mod: ER

## 2025-04-29 PROCEDURE — 96375 TX/PRO/DX INJ NEW DRUG ADDON: CPT | Mod: ER

## 2025-04-29 PROCEDURE — 25000003 PHARM REV CODE 250: Mod: ER | Performed by: EMERGENCY MEDICINE

## 2025-04-29 PROCEDURE — 99285 EMERGENCY DEPT VISIT HI MDM: CPT | Mod: 25,ER

## 2025-04-29 PROCEDURE — 63600175 PHARM REV CODE 636 W HCPCS: Mod: ER | Performed by: EMERGENCY MEDICINE

## 2025-04-29 RX ORDER — KETOROLAC TROMETHAMINE 30 MG/ML
15 INJECTION, SOLUTION INTRAMUSCULAR; INTRAVENOUS
Status: COMPLETED | OUTPATIENT
Start: 2025-04-29 | End: 2025-04-29

## 2025-04-29 RX ORDER — BUTALBITAL, ACETAMINOPHEN AND CAFFEINE 50; 325; 40 MG/1; MG/1; MG/1
1 TABLET ORAL EVERY 4 HOURS PRN
Qty: 12 TABLET | Refills: 0 | Status: SHIPPED | OUTPATIENT
Start: 2025-04-29 | End: 2025-05-02

## 2025-04-29 RX ORDER — DIPHENHYDRAMINE HYDROCHLORIDE 50 MG/ML
12.5 INJECTION, SOLUTION INTRAMUSCULAR; INTRAVENOUS
Status: COMPLETED | OUTPATIENT
Start: 2025-04-29 | End: 2025-04-29

## 2025-04-29 RX ORDER — PROCHLORPERAZINE EDISYLATE 5 MG/ML
5 INJECTION INTRAMUSCULAR; INTRAVENOUS
Status: COMPLETED | OUTPATIENT
Start: 2025-04-29 | End: 2025-04-29

## 2025-04-29 RX ORDER — BUTALBITAL, ACETAMINOPHEN AND CAFFEINE 50; 325; 40 MG/1; MG/1; MG/1
1 TABLET ORAL EVERY 4 HOURS PRN
Qty: 12 TABLET | Refills: 0 | Status: SHIPPED | OUTPATIENT
Start: 2025-04-29 | End: 2025-04-29

## 2025-04-29 RX ADMIN — SODIUM CHLORIDE 1000 ML: 9 INJECTION, SOLUTION INTRAVENOUS at 08:04

## 2025-04-29 RX ADMIN — PROCHLORPERAZINE EDISYLATE 5 MG: 5 INJECTION INTRAMUSCULAR; INTRAVENOUS at 08:04

## 2025-04-29 RX ADMIN — KETOROLAC TROMETHAMINE 15 MG: 30 INJECTION, SOLUTION INTRAMUSCULAR; INTRAVENOUS at 09:04

## 2025-04-29 RX ADMIN — DIPHENHYDRAMINE HYDROCHLORIDE 12.5 MG: 50 INJECTION INTRAMUSCULAR; INTRAVENOUS at 08:04

## 2025-04-29 NOTE — Clinical Note
"Cristo Hansenua" Speedy was seen and treated in our emergency department on 4/29/2025.  He may return to work on 05/01/2025.       If you have any questions or concerns, please don't hesitate to call.      Concha Johnson MD"
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
